# Patient Record
Sex: FEMALE | Race: BLACK OR AFRICAN AMERICAN | NOT HISPANIC OR LATINO | Employment: FULL TIME | URBAN - METROPOLITAN AREA
[De-identification: names, ages, dates, MRNs, and addresses within clinical notes are randomized per-mention and may not be internally consistent; named-entity substitution may affect disease eponyms.]

---

## 2017-05-08 ENCOUNTER — PATIENT MESSAGE (OUTPATIENT)
Dept: OBSTETRICS AND GYNECOLOGY | Facility: CLINIC | Age: 32
End: 2017-05-08

## 2017-05-26 ENCOUNTER — PATIENT MESSAGE (OUTPATIENT)
Dept: OBSTETRICS AND GYNECOLOGY | Facility: CLINIC | Age: 32
End: 2017-05-26

## 2017-06-07 ENCOUNTER — PATIENT MESSAGE (OUTPATIENT)
Dept: OBSTETRICS AND GYNECOLOGY | Facility: CLINIC | Age: 32
End: 2017-06-07

## 2017-06-28 ENCOUNTER — PATIENT MESSAGE (OUTPATIENT)
Dept: OBSTETRICS AND GYNECOLOGY | Facility: CLINIC | Age: 32
End: 2017-06-28

## 2017-06-30 ENCOUNTER — TELEPHONE (OUTPATIENT)
Dept: OBSTETRICS AND GYNECOLOGY | Facility: CLINIC | Age: 32
End: 2017-06-30

## 2017-06-30 NOTE — TELEPHONE ENCOUNTER
Called pt to reschedule her appt due to Dr Maynard not being available today.  Informed pt of appt date and time.  Pt verbalized understanding.  Letter sent out.

## 2017-07-03 ENCOUNTER — OFFICE VISIT (OUTPATIENT)
Dept: OBSTETRICS AND GYNECOLOGY | Facility: CLINIC | Age: 32
End: 2017-07-03
Attending: OBSTETRICS & GYNECOLOGY
Payer: MEDICAID

## 2017-07-03 VITALS
WEIGHT: 209.44 LBS | HEIGHT: 61 IN | DIASTOLIC BLOOD PRESSURE: 84 MMHG | BODY MASS INDEX: 39.54 KG/M2 | SYSTOLIC BLOOD PRESSURE: 130 MMHG

## 2017-07-03 DIAGNOSIS — Z30.9 ENCOUNTER FOR CONTRACEPTIVE MANAGEMENT, UNSPECIFIED TYPE: ICD-10-CM

## 2017-07-03 DIAGNOSIS — Z30.432 ENCOUNTER FOR IUD REMOVAL: Primary | ICD-10-CM

## 2017-07-03 LAB
B-HCG UR QL: NEGATIVE
CTP QC/QA: YES

## 2017-07-03 PROCEDURE — 99212 OFFICE O/P EST SF 10 MIN: CPT | Mod: PBBFAC,25 | Performed by: OBSTETRICS & GYNECOLOGY

## 2017-07-03 PROCEDURE — 99999 PR PBB SHADOW E&M-EST. PATIENT-LVL II: CPT | Mod: PBBFAC,,, | Performed by: OBSTETRICS & GYNECOLOGY

## 2017-07-03 PROCEDURE — 99499 UNLISTED E&M SERVICE: CPT | Mod: S$PBB,,, | Performed by: OBSTETRICS & GYNECOLOGY

## 2017-07-03 PROCEDURE — 58301 REMOVE INTRAUTERINE DEVICE: CPT | Mod: S$PBB,,, | Performed by: OBSTETRICS & GYNECOLOGY

## 2017-07-03 PROCEDURE — 81025 URINE PREGNANCY TEST: CPT | Mod: PBBFAC | Performed by: OBSTETRICS & GYNECOLOGY

## 2017-07-03 PROCEDURE — 58301 REMOVE INTRAUTERINE DEVICE: CPT | Mod: PBBFAC | Performed by: OBSTETRICS & GYNECOLOGY

## 2017-07-03 RX ORDER — MEDROXYPROGESTERONE ACETATE 150 MG/ML
150 INJECTION, SUSPENSION INTRAMUSCULAR
Qty: 1 ML | Refills: 4 | Status: SHIPPED | OUTPATIENT
Start: 2017-07-03 | End: 2017-07-27 | Stop reason: ALTCHOICE

## 2017-07-03 NOTE — PROGRESS NOTES
CC: Removal of IUD    Dipti Villafana is a 32 y.o. female  presents for IUD removal because she desires another form of contraception.      PRE-IUD REMOVAL COUNSELING:  The patient was advised of minimal risks of bleeding and pain and she agrees to proceed.    PROCEDURE:  TIME OUT PERFORMED.  IUD strings were  visualized at the os and grasped. IUD removed with gentle traction.  The patient tolerated the procedure well.    ASSESSMENT:  Contraceptive Management / Removal IUD.    POST IUD REMOVAL COUNSELING:  Expect period-like flow to occur after Mirena IUD removal and periods to return to pre-IUD pattern.  Manage post IUD removal cramping with NSAIDs, Tylenol or Rx per [employer].    POST IUD REMOVAL CONTRACEPTION:  If planning pregnancy, RX for prenatal vitamins.    Counseling lasted approximately 15 minutes and all her questions were answered.    FOLLOW-UP: With me in 2 weeks for Nexplanon placement.    Authorization submitted.  Patient desires DepoProvera until Nexplanon authorization received.

## 2017-07-20 ENCOUNTER — PATIENT MESSAGE (OUTPATIENT)
Dept: OBSTETRICS AND GYNECOLOGY | Facility: CLINIC | Age: 32
End: 2017-07-20

## 2017-07-26 ENCOUNTER — TELEPHONE (OUTPATIENT)
Dept: OBSTETRICS AND GYNECOLOGY | Facility: CLINIC | Age: 32
End: 2017-07-26

## 2017-07-26 DIAGNOSIS — Z30.011 ENCOUNTER FOR INITIAL PRESCRIPTION OF CONTRACEPTIVE PILLS: Primary | ICD-10-CM

## 2017-07-26 NOTE — TELEPHONE ENCOUNTER
----- Message from Mitchell Rush sent at 7/26/2017 11:00 AM CDT -----  PT IS CKING TO SEE IF HER INSURANCE APPROVE HER [phone]

## 2017-07-26 NOTE — TELEPHONE ENCOUNTER
Called Nexplanon and spoke to Mariya regarding the status of the the pt's benefit investigation.  Mariya stated that the Nexplanon is covered under buy and bill and the specialty pharmacy.  Instructed Mariya to fax over the benefit investigation.  Mariya verbalized understanding.  Called pt to inform her that her benefit investigation is being faxed over and that once the benefit investigation is received it will have to be sent to her pharmacy in order to get the device.  Questioned pt if she wanted BC pills in the mean time.  Pt stated that she would like OCPs until her Nexplanon comes in.  Informed pt that a message will be sent to Dr Maynard and she will receive a call back.  Pt verbalized understanding.    Pt is requesting OCPs until her Nexplanon comes in.

## 2017-07-27 RX ORDER — NORGESTIMATE AND ETHINYL ESTRADIOL 0.25-0.035
1 KIT ORAL DAILY
Qty: 30 TABLET | Refills: 11 | Status: SHIPPED | OUTPATIENT
Start: 2017-07-27 | End: 2019-06-11

## 2017-07-28 ENCOUNTER — TELEPHONE (OUTPATIENT)
Dept: OBSTETRICS AND GYNECOLOGY | Facility: CLINIC | Age: 32
End: 2017-07-28

## 2017-07-28 NOTE — TELEPHONE ENCOUNTER
Called pt to inform her that her RX for her OCPs has been sent to her pharmacy and once her Nexplanon is received she will get a call to schedule her insertion.  Pt verbalized understanding.

## 2017-08-11 ENCOUNTER — TELEPHONE (OUTPATIENT)
Dept: OBSTETRICS AND GYNECOLOGY | Facility: CLINIC | Age: 32
End: 2017-08-11

## 2017-08-11 NOTE — TELEPHONE ENCOUNTER
Called Nexplanon and spoke to April regarding checking the status of the pt's device.  April stated that [facility] tried to contact the office on 8/7/17.  Informed April that the office never receive a message from [employer].  April verbalized understanding and stated that the call will be transferred to [facility].  Spoke to Daniel at [facility] regarding the status of the pt's device. Daniel stated that the pt's device has not shipped out and that he will have it scheduled to deliver on 8/15/17.

## 2017-09-13 ENCOUNTER — TELEPHONE (OUTPATIENT)
Dept: OBSTETRICS AND GYNECOLOGY | Facility: CLINIC | Age: 32
End: 2017-09-13

## 2017-09-13 NOTE — TELEPHONE ENCOUNTER
Contacted [facility] regarding pt's delivery of her Nexplanon device. Spoke with Judah and she asked me to verify the address that the Nexplanon was being shipped to and if it was okay to be delivered on 9/15. Informed Judah that it was okay to be shipped for Friday. Judah verbalized understanding.

## 2017-09-13 NOTE — TELEPHONE ENCOUNTER
----- Message from Kelle Loya sent at 9/13/2017  4:03 PM CDT -----  Contact: [facility]  _  1st Request  _  2nd Request  _  3rd Request        Who: [facility]    Why: Pharmacy is calling to schedule a delivery date for the patient's Nexplanon IUD    What Number to Call Back: [phone]    When to Expect a call back: (Before the end of the day)   -- if call after 3:00 call back will be tomorrow.

## 2017-09-18 ENCOUNTER — TELEPHONE (OUTPATIENT)
Dept: OBSTETRICS AND GYNECOLOGY | Facility: CLINIC | Age: 32
End: 2017-09-18

## 2017-09-18 NOTE — TELEPHONE ENCOUNTER
Contacted [facility] pharmacy to see if they can ship the pt's device for insertion. Spoke with representative Lyndon and he asked me to verify pt's first and last name and date of birth. He asked me to verify her address and phone number. He asked me to verify the doctor's name. Verified the information with Lyndon. Lyndon transferred me to the specialty pharmacy direct line. Which is [phone]. Connected with Yadira in the [facility] specialty pharmacy she needed to go over the coverage claim with the claims department and then she'll be able to r/s the delivery. Yadira verified the office address and set the delivery for September 20th. Verbalized understanding with Yadira. Contacted pt to inform her that I her device is being shipped and that I will contact her to schedule an insertion once it arrives. Pt informed me that she contacted someone at the pharmacy and they to.ld her about three weeks ago that it had been shipped. Pt informed that when I spoke with Yadira she said there was a hold and that they had to run it through claims to make sure it was covered. Pt verbalized understanding.

## 2017-09-22 ENCOUNTER — TELEPHONE (OUTPATIENT)
Dept: OBSTETRICS AND GYNECOLOGY | Facility: CLINIC | Age: 32
End: 2017-09-22

## 2017-09-22 NOTE — TELEPHONE ENCOUNTER
Contacted the pt to schedule nexplanon insertion. Pt offered next available appointment. Pt inquired if she can be seen sooner. Pt offered another appointment. Pt given appointment location, date, and time. Pt verbalized understanding.

## 2017-10-12 ENCOUNTER — PROCEDURE VISIT (OUTPATIENT)
Dept: OBSTETRICS AND GYNECOLOGY | Facility: CLINIC | Age: 32
End: 2017-10-12
Attending: OBSTETRICS & GYNECOLOGY
Payer: MEDICAID

## 2017-10-12 VITALS
SYSTOLIC BLOOD PRESSURE: 94 MMHG | DIASTOLIC BLOOD PRESSURE: 56 MMHG | WEIGHT: 207.69 LBS | HEIGHT: 61 IN | BODY MASS INDEX: 39.21 KG/M2

## 2017-10-12 DIAGNOSIS — Z30.017 INSERTION OF NEXPLANON: Primary | ICD-10-CM

## 2017-10-12 LAB
B-HCG UR QL: NEGATIVE
CTP QC/QA: YES

## 2017-10-12 PROCEDURE — 11981 INSERTION DRUG DLVR IMPLANT: CPT | Mod: S$PBB,,, | Performed by: OBSTETRICS & GYNECOLOGY

## 2017-10-12 PROCEDURE — 11981 INSERTION DRUG DLVR IMPLANT: CPT | Mod: PBBFAC | Performed by: OBSTETRICS & GYNECOLOGY

## 2017-10-12 PROCEDURE — 81025 URINE PREGNANCY TEST: CPT | Mod: PBBFAC | Performed by: OBSTETRICS & GYNECOLOGY

## 2017-10-12 RX ORDER — CYCLOBENZAPRINE HCL 10 MG
TABLET ORAL
Refills: 0 | COMMUNITY
Start: 2017-07-29 | End: 2018-05-19 | Stop reason: ALTCHOICE

## 2017-10-12 RX ORDER — IBUPROFEN 800 MG/1
TABLET ORAL
Refills: 0 | COMMUNITY
Start: 2017-07-29 | End: 2018-05-19 | Stop reason: ALTCHOICE

## 2017-10-12 RX ADMIN — ETONOGESTREL 68 MG: 68 IMPLANT SUBCUTANEOUS at 11:10

## 2017-10-12 NOTE — PROCEDURES
Procedures     CC: NEXPLANON INSERTION:      PRE-NEXPLANON INSERTION COUNSELING:  All contraceptive options were reviewed and the patient chooses Implanon.  Patients history was reviewed and there were no contraindications to Implanon.  The procedure and minimal risks of pain, bleeding, bruising and infection at the insertion site discussed. Possible irregular menstrual bleeding pattern versus amenorrhea was explained.  No protection against STDs discussed.  Written information provided; all questions answered and patient agrees to proceed.  Consent signed and scanned into computer.    EXAM:  With patient in supine position the nondominant arm was flexed at the elbow and externally rotated.  The insertion site was identified 6-8 cm above the elbow crease at the inner side of the upper arm overlying the groove between biceps and triceps.  The insertion site was marked and a second sushila was placed 6-8 cm above the first.    PROCEDURE:  TIME OUT PERFORMED.  The insertion site was prepped with antiseptic and injected with 3 cc of 1% Xylocaine without epinephrine subq along the planned insertion canal.  Xylocaine subq along the planned insertion canal.  Using sterile technique the Nexplanon applicator was visually verified and removed from the blister pack.  The base of the applicator was gently tapped with the needle pointed up until the implant disappeared back into the needle and the needle cap was removed.  The needle tip was inserted bevel side up at a 20 degree angle to penetrate the skin.  The applicator was lowered parallel to the arm and the skin was tented with the needle.  The seal of the applicator was broken by pressing the obturator support and turned 90degrees.  The obturator tip was fixed in place on the arm with one hand and with the other hand the needle was slowly and fully retracted back along full length of the obturator.  The grooved tip of the obturator was visible inside the needle and the  implant was palpable after insertion.  A small adhesive bandage and then a pressure bandage was placed over the insertion site.  The patient tolerated the procedure well.    ASSESSMENT:  1. Contraception management / Nexplanon insertion.    POST NEXPLANON INSERTION COUNSELING:  Manage post Nexplanon placement arm pain with NSAIDs, Tylenol or Rx per Med[employer].  Keep arm elevated and apply intermittent ice packs to decrease pain and bruising for 24 Hours.  May remove bandage in 24 hours.  Nexplanon danger signs (worsening pain at insertion site, bleeding through bandage, redness and/or pus drainage at insertion site).  Removal in 3 years.    Counseling lasted approximately 15 minutes and all her questions were answered.    FOLLOW-UP: with me in two weeks.

## 2017-11-30 ENCOUNTER — TELEPHONE (OUTPATIENT)
Dept: OBSTETRICS AND GYNECOLOGY | Facility: CLINIC | Age: 32
End: 2017-11-30

## 2017-11-30 NOTE — TELEPHONE ENCOUNTER
Contacted the pt to r/s her appointment today on 11/30. Informed the pt that Dr. Maynard is out sick and that I can r/s her with another provider. Pt inquired if she can r/s to Dr. Batista's next available appointment. Informed the pt that Dr. Maynard's next available is not until January and that the schedule for January has not been released yet. Pt informed me that she will schedule for January once it has become available. Verbalized understanding. Reminder set.

## 2017-12-01 ENCOUNTER — TELEPHONE (OUTPATIENT)
Dept: OBSTETRICS AND GYNECOLOGY | Facility: CLINIC | Age: 32
End: 2017-12-01

## 2017-12-01 NOTE — TELEPHONE ENCOUNTER
Contacted the pt to schedule annual visit. No answer, left VM message for the pt to call the clinic back.

## 2017-12-01 NOTE — TELEPHONE ENCOUNTER
----- Message from Caio Ibrahim MA sent at 11/30/2017  9:20 AM CST -----  Schedule annual in early January

## 2017-12-12 ENCOUNTER — OFFICE VISIT (OUTPATIENT)
Dept: OBSTETRICS AND GYNECOLOGY | Facility: CLINIC | Age: 32
End: 2017-12-12
Payer: MEDICAID

## 2017-12-12 VITALS
WEIGHT: 206.81 LBS | DIASTOLIC BLOOD PRESSURE: 82 MMHG | HEIGHT: 61 IN | SYSTOLIC BLOOD PRESSURE: 124 MMHG | BODY MASS INDEX: 39.05 KG/M2

## 2017-12-12 DIAGNOSIS — Z01.419 WELL WOMAN EXAM WITH ROUTINE GYNECOLOGICAL EXAM: Primary | ICD-10-CM

## 2017-12-12 DIAGNOSIS — Z11.3 SCREEN FOR STD (SEXUALLY TRANSMITTED DISEASE): ICD-10-CM

## 2017-12-12 DIAGNOSIS — Z97.5 NEXPLANON IN PLACE: ICD-10-CM

## 2017-12-12 PROCEDURE — 99213 OFFICE O/P EST LOW 20 MIN: CPT | Mod: PBBFAC | Performed by: NURSE PRACTITIONER

## 2017-12-12 PROCEDURE — 87660 TRICHOMONAS VAGIN DIR PROBE: CPT

## 2017-12-12 PROCEDURE — 87591 N.GONORRHOEAE DNA AMP PROB: CPT

## 2017-12-12 PROCEDURE — 99395 PREV VISIT EST AGE 18-39: CPT | Mod: S$PBB,,, | Performed by: NURSE PRACTITIONER

## 2017-12-12 PROCEDURE — 87480 CANDIDA DNA DIR PROBE: CPT

## 2017-12-12 PROCEDURE — 99999 PR PBB SHADOW E&M-EST. PATIENT-LVL III: CPT | Mod: PBBFAC,,, | Performed by: NURSE PRACTITIONER

## 2017-12-12 NOTE — PROGRESS NOTES
CC: Annual  HPI: Pt is a 32 y.o.  female who presents for routine annual exam. She uses Nexplanon for contraception. She is very happy with it. She has not bled at all since getting it in October. She is able to palpate the implant in her left upper arm. She does not want STD screening. No problems or complaints today.    Last pap-8/2016 WNL    ROS:  GENERAL: Feeling well overall. Denies fever or chills.   SKIN: Denies rash or lesions.   HEAD: Denies head injury or headache.   NODES: Denies enlarged lymph nodes.   CHEST: Denies chest pain or shortness of breath.   CARDIOVASCULAR: Denies palpitations or left sided chest pain.   ABDOMEN: No abdominal pain, constipation, diarrhea, nausea, vomiting or rectal bleeding.   URINARY: No dysuria, hematuria, or burning on urination.  REPRODUCTIVE: See HPI.   BREASTS: Denies pain, lumps, or nipple discharge.   HEMATOLOGIC: No easy bruisability or excessive bleeding.   MUSCULOSKELETAL: Denies joint pain or swelling.   NEUROLOGIC: Denies syncope or weakness.   PSYCHIATRIC: Denies depression, anxiety or mood swings.    PE:   APPEARANCE: Well nourished, well developed, Black or  female in no acute distress.  NODES: no cervical, supraclavicular, or inguinal lymphadenopathy  BREASTS: Symmetrical, no skin changes or visible lesions. No palpable masses, nipple discharge or adenopathy bilaterally.  ABDOMEN: Soft. No tenderness or masses. No distention. No hernias palpated. No CVA tenderness.  VULVA: No lesions. Normal external female genitalia.  URETHRAL MEATUS: Normal size and location, no lesions, no prolapse.  URETHRA: No masses, tenderness, or prolapse.  VAGINA: Moist. No lesions or lacerations noted. No abnormal discharge present. No odor present.   CERVIX: No lesions or discharge. No cervical motion tenderness.   UTERUS: Normal size, regular shape, mobile, non-tender.  ADNEXA: No tenderness. No fullness or masses palpated in the adnexal regions.   ANUS PERINEUM:  Normal.      Diagnosis:  1. Well woman exam with routine gynecological exam    2. Nexplanon in place    3. Screen for STD (sexually transmitted disease)        Plan:     Orders Placed This Encounter    C. trachomatis/N. gonorrhoeae by AMP DNA Cervix    Vaginosis Screen by DNA Probe     -gcct and affirm pending  -pap current    Patient was counseled today on the new ACS guidelines for cervical cytology screening as well as the current recommendations for breast cancer screening. She was counseled to follow up with her PCP for other routine health maintenance. Counseling session lasted approximately 10 minutes, and all her questions were answered.    Follow-up with me in 1 year for routine exam; pap in 2 years.

## 2017-12-13 LAB
C TRACH DNA SPEC QL NAA+PROBE: NOT DETECTED
CANDIDA RRNA VAG QL PROBE: NEGATIVE
G VAGINALIS RRNA GENITAL QL PROBE: NEGATIVE
N GONORRHOEA DNA SPEC QL NAA+PROBE: NOT DETECTED
T VAGINALIS RRNA GENITAL QL PROBE: NEGATIVE

## 2018-05-19 ENCOUNTER — HOSPITAL ENCOUNTER (EMERGENCY)
Facility: OTHER | Age: 33
Discharge: HOME | End: 2018-05-19
Attending: EMERGENCY MEDICINE
Payer: MEDICAID

## 2018-05-19 VITALS
BODY MASS INDEX: 38.68 KG/M2 | SYSTOLIC BLOOD PRESSURE: 139 MMHG | HEART RATE: 79 BPM | OXYGEN SATURATION: 96 % | WEIGHT: 197 LBS | RESPIRATION RATE: 17 BRPM | DIASTOLIC BLOOD PRESSURE: 73 MMHG | TEMPERATURE: 97 F | HEIGHT: 60 IN

## 2018-05-19 DIAGNOSIS — R10.9 ACUTE LEFT FLANK PAIN: ICD-10-CM

## 2018-05-19 DIAGNOSIS — S39.012A BACK STRAIN, INITIAL ENCOUNTER: Primary | ICD-10-CM

## 2018-05-19 LAB
B-HCG UR QL: NEGATIVE
BILIRUB UR QL STRIP: NEGATIVE
CLARITY UR: CLEAR
COLOR UR: YELLOW
CTP QC/QA: YES
GLUCOSE UR QL STRIP: NEGATIVE
HGB UR QL STRIP: NEGATIVE
KETONES UR QL STRIP: NEGATIVE
LEUKOCYTE ESTERASE UR QL STRIP: NEGATIVE
NITRITE UR QL STRIP: NEGATIVE
PH UR STRIP: 6 [PH] (ref 5–8)
PROT UR QL STRIP: NEGATIVE
SP GR UR STRIP: >=1.03 (ref 1–1.03)
URN SPEC COLLECT METH UR: ABNORMAL
UROBILINOGEN UR STRIP-ACNC: NEGATIVE EU/DL

## 2018-05-19 PROCEDURE — 81003 URINALYSIS AUTO W/O SCOPE: CPT

## 2018-05-19 PROCEDURE — 99284 EMERGENCY DEPT VISIT MOD MDM: CPT | Mod: 25

## 2018-05-19 PROCEDURE — 63600175 PHARM REV CODE 636 W HCPCS: Performed by: EMERGENCY MEDICINE

## 2018-05-19 PROCEDURE — 81025 URINE PREGNANCY TEST: CPT | Performed by: EMERGENCY MEDICINE

## 2018-05-19 PROCEDURE — 96372 THER/PROPH/DIAG INJ SC/IM: CPT

## 2018-05-19 RX ORDER — METHOCARBAMOL 500 MG/1
1000 TABLET, FILM COATED ORAL 2 TIMES DAILY PRN
Qty: 25 TABLET | Refills: 0 | Status: SHIPPED | OUTPATIENT
Start: 2018-05-19 | End: 2018-05-24

## 2018-05-19 RX ORDER — KETOROLAC TROMETHAMINE 30 MG/ML
15 INJECTION, SOLUTION INTRAMUSCULAR; INTRAVENOUS
Status: COMPLETED | OUTPATIENT
Start: 2018-05-19 | End: 2018-05-19

## 2018-05-19 RX ORDER — IBUPROFEN 600 MG/1
600 TABLET ORAL EVERY 8 HOURS PRN
Qty: 30 TABLET | Refills: 0 | Status: SHIPPED | OUTPATIENT
Start: 2018-05-19 | End: 2018-07-31

## 2018-05-19 RX ADMIN — KETOROLAC TROMETHAMINE 15 MG: 30 INJECTION, SOLUTION INTRAMUSCULAR; INTRAVENOUS at 04:05

## 2018-05-19 NOTE — ED NOTES
Patient presents to the ED with a complaint of left sided back pain that has started recently, patient states that pain worsens with movement. Patient denies any trouble with urination.

## 2018-05-19 NOTE — ED PROVIDER NOTES
Encounter Date: 5/19/2018    SCRIBE #1 NOTE: I, Justina Galindo, am scribing for, and in the presence of, Dr. Tang.       History     Chief Complaint   Patient presents with    Back Pain     pt states left side of her lower back hurts 6/10, denies it moving to abdomen or having urinary symptoms. No previous injury     Time seen by provider: 4:03 PM    This is a 32 y.o. female who presents with complaint of left mid back pain that began while at rest last night. The pain worsens with movement and deep breaths. She denies recent trauma, injury, or heavy lifting. She denies chest pain, fever, abdominal pain, urinary symptoms, numbness, or weakness. No h/o similar episodes      The history is provided by the patient.     Review of patient's allergies indicates:  No Known Allergies  No past medical history on file.  No past surgical history on file.  Family History   Problem Relation Age of Onset    Breast cancer Neg Hx     Colon cancer Neg Hx     Ovarian cancer Neg Hx      Social History   Substance Use Topics    Smoking status: Never Smoker    Smokeless tobacco: Never Used    Alcohol use Yes      Comment: occasional     Review of Systems   Constitutional: Negative for fever.   HENT: Negative for sore throat.    Respiratory: Positive for shortness of breath.    Cardiovascular: Negative for chest pain and palpitations.   Gastrointestinal: Negative for abdominal pain, nausea and vomiting.   Genitourinary: Negative for decreased urine volume, difficulty urinating, dysuria, enuresis, frequency, hematuria and urgency.   Musculoskeletal: Positive for back pain. Negative for gait problem.        Positive for leg pain.   Skin: Negative for rash.   Neurological: Negative for weakness and numbness.   Hematological: Does not bruise/bleed easily.       Physical Exam     Initial Vitals [05/19/18 1418]   BP Pulse Resp Temp SpO2   136/84 108 16 97.7 °F (36.5 °C) 99 %      MAP       101.33         Physical Exam    Nursing  note and vitals reviewed.  Constitutional: She appears well-developed and well-nourished. She is not diaphoretic. No distress.   HENT:   Head: Normocephalic and atraumatic.   Eyes: Conjunctivae and EOM are normal. No scleral icterus.   Neck: Normal range of motion. Neck supple.   Cardiovascular: Normal rate, regular rhythm and normal heart sounds. Exam reveals no gallop and no friction rub.    No murmur heard.  Pulmonary/Chest: Breath sounds normal. No respiratory distress. She has no wheezes. She has no rhonchi. She has no rales.   Musculoskeletal: Normal range of motion. She exhibits tenderness (left lower thoracic paraspinal muscles). She exhibits no edema.   Lymphadenopathy:     She has no cervical adenopathy.   Neurological: She is alert and oriented to person, place, and time. She has normal strength. No cranial nerve deficit.   Skin: Skin is warm and dry. No rash noted. No pallor.   Psychiatric: She has a normal mood and affect. Her behavior is normal. Thought content normal.         ED Course   Procedures  Labs Reviewed   URINALYSIS - Abnormal; Notable for the following:        Result Value    Specific Gravity, UA >=1.030 (*)     All other components within normal limits   POCT URINE PREGNANCY      Imaging Results          X-Ray Chest PA And Lateral (Final result)  Result time 05/19/18 17:05:31    Final result by Jorge Ogden MD (05/19/18 17:05:31)                 Impression:      1. No acute cardiopulmonary process.      Electronically signed by: Jorge Ogden MD  Date:    05/19/2018  Time:    17:05             Narrative:    EXAMINATION:  XR CHEST PA AND LATERAL    CLINICAL HISTORY:  Unspecified abdominal pain    TECHNIQUE:  PA and lateral views of the chest were performed.    COMPARISON:  None    FINDINGS:  The cardiomediastinal silhouette is not enlarged.  There is no pleural effusion.  The trachea is midline.  The lungs are symmetrically expanded bilaterally without evidence of acute parenchymal  process. No large focal consolidation seen.  There is no pneumothorax.  The osseous structures are unremarkable.                                   X-Rays:   Independently Interpreted Readings:   Chest X-Ray: No infiltrates, effusion, or pneumothorax.     Medical Decision Making:   Initial Assessment:       Healthy 31 y/o female with 1 day atrauamtic left mid back pain. Mild paraspinal muscle tenderness on exam with no associated urinary symptoms or abdominal tenderness. Worse with movement, so mostly likely muscle strain or spasm. No DVT/PE risk factors, there is some pleuritic component to pain but hypoxia or SOB at present. UA and CXR unremarkable, no sign renal colic, pyelo, or PNA. Improved with Toradol. Will discharge with NSAIDs and Robaxin PRN with PCP follow up and return precautions.      Clinical Tests:   Lab Tests: Reviewed and Ordered  Radiological Study: Ordered and Reviewed            Scribe Attestation:   Scribe #1: I performed the above scribed service and the documentation accurately describes the services I performed. I attest to the accuracy of the note.    Attending Attestation:           Physician Attestation for Scribe:  Physician Attestation Statement for Scribe #1: I, Dr. Tang, reviewed documentation, as scribed by Justina Galindo in my presence, and it is both accurate and complete.                    Clinical Impression:     1. Back strain, initial encounter    2. Acute left flank pain                               Hitesh Tang MD  05/20/18 [id number]

## 2018-07-31 ENCOUNTER — HOSPITAL ENCOUNTER (EMERGENCY)
Facility: OTHER | Age: 33
Discharge: HOME | End: 2018-07-31
Attending: EMERGENCY MEDICINE
Payer: MEDICAID

## 2018-07-31 ENCOUNTER — OFFICE VISIT (OUTPATIENT)
Dept: OBSTETRICS AND GYNECOLOGY | Facility: CLINIC | Age: 33
End: 2018-07-31
Payer: MEDICAID

## 2018-07-31 VITALS
WEIGHT: 206 LBS | BODY MASS INDEX: 38.89 KG/M2 | DIASTOLIC BLOOD PRESSURE: 75 MMHG | HEIGHT: 61 IN | TEMPERATURE: 99 F | RESPIRATION RATE: 17 BRPM | OXYGEN SATURATION: 100 % | SYSTOLIC BLOOD PRESSURE: 152 MMHG | HEART RATE: 64 BPM

## 2018-07-31 VITALS
DIASTOLIC BLOOD PRESSURE: 88 MMHG | SYSTOLIC BLOOD PRESSURE: 122 MMHG | BODY MASS INDEX: 39.05 KG/M2 | WEIGHT: 206.81 LBS | HEIGHT: 61 IN

## 2018-07-31 DIAGNOSIS — N93.9 ABNORMAL UTERINE BLEEDING (AUB): Primary | ICD-10-CM

## 2018-07-31 DIAGNOSIS — M79.89 FOOT SWELLING: Primary | ICD-10-CM

## 2018-07-31 DIAGNOSIS — M79.673 FOOT PAIN: ICD-10-CM

## 2018-07-31 LAB
B-HCG UR QL: NEGATIVE
CTP QC/QA: YES

## 2018-07-31 PROCEDURE — 99284 EMERGENCY DEPT VISIT MOD MDM: CPT | Mod: 25,27

## 2018-07-31 PROCEDURE — 81025 URINE PREGNANCY TEST: CPT | Performed by: EMERGENCY MEDICINE

## 2018-07-31 PROCEDURE — 99213 OFFICE O/P EST LOW 20 MIN: CPT | Mod: PBBFAC | Performed by: OBSTETRICS & GYNECOLOGY

## 2018-07-31 PROCEDURE — 99999 PR PBB SHADOW E&M-EST. PATIENT-LVL III: CPT | Mod: PBBFAC,,, | Performed by: OBSTETRICS & GYNECOLOGY

## 2018-07-31 PROCEDURE — 99213 OFFICE O/P EST LOW 20 MIN: CPT | Mod: S$PBB,,, | Performed by: OBSTETRICS & GYNECOLOGY

## 2018-07-31 RX ORDER — ESTRADIOL 2 MG/1
2 TABLET ORAL DAILY
Qty: 30 TABLET | Refills: 0 | Status: SHIPPED | OUTPATIENT
Start: 2018-07-31 | End: 2019-06-11

## 2018-07-31 RX ORDER — IBUPROFEN 600 MG/1
600 TABLET ORAL EVERY 6 HOURS PRN
Qty: 20 TABLET | Refills: 0 | Status: SHIPPED | OUTPATIENT
Start: 2018-07-31 | End: 2019-06-11

## 2018-07-31 NOTE — ED NOTES
Two patient identifiers have been checked and are correct.      Appearance: Pt awake, alert & oriented to person, place & time. Pt in no acute distress at present time. Pt is clean and well groomed with clothes appropriately fastened.   Skin: Skin warm, dry & intact. Color consistent with ethnicity. Mucous membranes moist. No breakdown or brusing noted.   Musculoskeletal: Patient moving all extremities well, no obvious swelling or deformities noted. + left anterior foot pain and tenderness, denies numbness/tingling to affected site. No bruising or swelling noted to area.   Respiratory: Respirations spontaneous, even, and non-labored. Visible chest rise noted. Airway is open and patent. No accessory muscle use noted.   Neurologic: Sensation is intact. Speech is clear and appropriate. Eyes open spontaneously, behavior appropriate to situation, follows commands, facial expression symmetrical, bilateral hand grasp equal and even, purposeful motor response noted.  Cardiac: All peripheral pulses present. No Bilateral lower extremity edema. Cap refill is <3 seconds.

## 2018-07-31 NOTE — ED PROVIDER NOTES
"Encounter Date: 7/31/2018       History     Chief Complaint   Patient presents with    Foot Pain     + left sided foot pain w/ new onset yesterday " when I got off work I noticed the top of my foot was all swollen and hurt". Denies trauma or injury to site. + 2 pulses no bruising noted     A 33-year-old female with no significant past medical history presents to the emergency department with complaints of swelling to the left foot.  She reports associated pain. She states it started last night after working.  She states it was larger last night has improved since after she elevated the affected foot and soaked it.  She denies any known trauma, injury, redness, warmth, fever, chills. she reports pain at a 8/10.      The history is provided by the patient.     Review of patient's allergies indicates:  No Known Allergies  History reviewed. No pertinent past medical history.  History reviewed. No pertinent surgical history.  Family History   Problem Relation Age of Onset    Breast cancer Neg Hx     Colon cancer Neg Hx     Ovarian cancer Neg Hx      Social History   Substance Use Topics    Smoking status: Never Smoker    Smokeless tobacco: Never Used    Alcohol use Yes      Comment: occasional     Review of Systems   Constitutional: Negative for chills and fever.   HENT: Negative for sore throat.    Respiratory: Negative for shortness of breath.    Cardiovascular: Negative for chest pain.   Gastrointestinal: Negative for nausea and vomiting.   Genitourinary: Negative for dysuria.   Musculoskeletal: Positive for arthralgias and joint swelling. Negative for back pain, neck pain and neck stiffness.   Skin: Negative for rash.   Neurological: Negative for weakness and numbness.   Hematological: Does not bruise/bleed easily.       Physical Exam     Initial Vitals [07/31/18 1020]   BP Pulse Resp Temp SpO2   (!) 143/74 70 16 99.4 °F (37.4 °C) 100 %      MAP       --         Physical Exam    Nursing note and vitals " reviewed.  Constitutional: She appears well-developed and well-nourished. She is not diaphoretic.  Non-toxic appearance. No distress.   HENT:   Head: Normocephalic and atraumatic.   Right Ear: External ear normal.   Left Ear: External ear normal.   Nose: Nose normal.   Eyes: Conjunctivae and lids are normal. No scleral icterus.   Neck: Normal range of motion and phonation normal. Neck supple.   Cardiovascular: Normal rate, regular rhythm, intact distal pulses and normal pulses. Exam reveals no decreased pulses.    Pulses:       Dorsalis pedis pulses are 2+ on the right side, and 2+ on the left side.   Abdominal: Normal appearance.   Musculoskeletal: Normal range of motion.        Left foot: There is swelling. There is normal range of motion, no tenderness, no bony tenderness, normal capillary refill, no crepitus, no deformity and no laceration.        Feet:    No obvious deformities, moving all extremities, normal gait  Localized swelling to the left dorsal foot that measures approximately 1 cm in diameter. Intact distal pulses with no sensory deficits.  No erythema, warmth, ecchymosis, induration or fluctuance.  No lymphangitis.  No open wounds.  Capillary refill less than 3 sec.  No bony deformity bony tenderness palpation on exam.   Neurological: She is alert and oriented to person, place, and time. She has normal strength. No sensory deficit.   Skin: Skin is warm, dry and intact. Capillary refill takes less than 2 seconds. No abrasion, no bruising, no ecchymosis, no laceration, no lesion, no rash and no abscess noted. No erythema.   Psychiatric: She has a normal mood and affect. Her speech is normal and behavior is normal. Judgment normal. Cognition and memory are normal.         ED Course   Procedures  Labs Reviewed   POCT URINE PREGNANCY          Imaging Results          X-Ray Foot Complete Left (Final result)  Result time 07/31/18 10:53:08    Final result by Grover Esquivel MD (07/31/18 10:53:08)                  Impression:      No evidence for acute fracture, bone destruction, or dislocation.    Mild degenerative changes.    Calcaneal spurs.      Electronically signed by: Grover Esquivel MD  Date:    07/31/2018  Time:    10:53             Narrative:    EXAMINATION:  XR FOOT COMPLETE 3 VIEW LEFT    CLINICAL HISTORY:  .  Pain in unspecified foot    TECHNIQUE:  AP, lateral and oblique views of the left foot were performed.    COMPARISON:  None    FINDINGS:  The bones are intact.  There is no evidence for acute fracture or bone destruction.  There are mild degenerative changes.  No bony erosions are identified.  There are small calcaneal spurs at the insertions of the Achilles tendon and plantar fascia.  Soft tissues are grossly unremarkable.                                 Medical Decision Making:   History:   Old Medical Records: I decided to obtain old medical records.  Initial Assessment:   33-year-old female with complaints consistent with foot pain and swelling.  Afebrile neurovascularly intact.  She is alert and healthy and nontoxic appearing.  She is in no apparent distress. No focal neurological deficits.  Exam documented above.  There is localized swelling to the dorsal aspect of the foot.  Discussed she where which patient states that she wears tennis shoes while at work.  This seems to be at a location of possible pressure point from the shoe.  She denies any changes in her footwear.  She denies trauma or injury. Does not appear to be consistent with serious bacterial infection, abscess or cellulitis.  No signs of trauma or injury and I doubt fracture dislocation.  Clinical Tests:   Lab Tests: Reviewed  Radiological Study: Reviewed  ED Management:  UPT negative.  X-ray obtained from triage with no evidence of acute fracture, dislocation or bony destruction.  She does have mild degenerative changes and calcaneus spur is present.  Will apply Ace wrap.  She is urged to assess her shoe wear and follow up if  symptoms do not improve.  Given information for the [facility].  She states understanding and agrees with this plan.  Will discharge home with ibuprofen.  This is the extent of patient's complaints today.  The patient was discussed with the attending physician who agrees with treatment plan.  Other:   I have discussed this case with another health care provider.       <> Summary of the Discussion: Rao  This note was created using [employer] dictation.  There may be typographical errors secondary to dictation.                        Clinical Impression:     1. Foot swelling    2. Foot pain            Disposition:   Disposition: Discharged  Condition: Stable                        Homa Vanegas PA-C  07/31/18 1124

## 2018-07-31 NOTE — ED TRIAGE NOTES
+ left sided foot pain w/ new onset yesterday. Denies injury or trauma to site. No numbness/tinlging to extremity

## 2018-07-31 NOTE — PROGRESS NOTES
Subjective:       Patient ID: Dipti Villafana is a 33 y.o. female.    Chief Complaint:  Vaginal Bleeding      History of Present Illness  Vaginal Bleeding   This is a new problem. The current episode started 1 to 4 weeks ago. The problem occurs rarely. The problem has been resolved. The pain is severe. The problem affects both sides. She is not pregnant. Associated symptoms include back pain and discolored urine. Pertinent negatives include no abdominal pain, anorexia, chills, constipation, diarrhea, dysuria, fever, flank pain, frequency, headaches, hematuria, nausea, painful intercourse, rash, urgency or vomiting. The vaginal bleeding is heavier than menses. She has been passing clots. She has not been passing tissue. The symptoms are aggravated by urinating. She has tried acetaminophen and warm bath for the symptoms. The treatment provided significant relief. She is sexually active. No, her partner does not have an STD. She uses an IUD for contraception. Her menstrual history has been irregular. Her past medical history is significant for menorrhagia. There is no history of an abdominal surgery, a  section, an ectopic pregnancy, endometriosis, a gynecological surgery, herpes simplex, metrorrhagia, miscarriage, ovarian cysts, perineal abscess, PID, an STD, a terminated pregnancy or vaginosis.     32 y/o  presents for evaluation of AUB with Nexplanon in place. Nexplanon placed in 10/12/17. Had no bleeding for about 8 months. Started bleeding heavily about 2 weeks ago for about 7 days (some days heavy, some days spotting). Bleeding has now stopped. She had the Mirena IUD for about 5 years without bleeding but switched to nexplanon due to discomfort at the end with activities such as exercise and intercourse. Otherwise has been happy with the Nexplanon.     GYN & OB History  No LMP recorded. Patient is not currently having periods (Reason: Birth Control).   Date of Last Pap: 2016    OB History     Para Term  AB Living   2 2 0 0 0 3   SAB TAB Ectopic Multiple Live Births   0 0 0 1 3      # Outcome Date GA Lbr Jaspreet/2nd Weight Sex Delivery Anes PTL Lv   2 Para 12    M Vag-Spont   JUAN CARLOS   1A Para 03/12/10    M Vag-Spont   JUAN CARLOS   1B Para 03/12/10    M Vag-Spont  N JUAN CARLOS          Review of Systems  Review of Systems   Constitutional: Negative for chills and fever.   Gastrointestinal: Negative for abdominal pain, anorexia, constipation, diarrhea, nausea and vomiting.   Genitourinary: Positive for menorrhagia, menstrual problem and vaginal bleeding. Negative for dysuria, flank pain, frequency, hematuria and urgency.   Musculoskeletal: Positive for back pain.   Skin:  Negative for rash.   Neurological: Negative for headaches.           Objective:    Physical Exam:   Constitutional: She is oriented to person, place, and time. She appears well-developed and well-nourished. No distress.    HENT:   Head: Normocephalic and atraumatic.    Eyes: EOM are normal.    Neck: Normal range of motion.     Pulmonary/Chest: Effort normal.                  Musculoskeletal: Normal range of motion and moves all extremeties.       Neurological: She is alert and oriented to person, place, and time.    Skin: Skin is warm. No rash noted.    Psychiatric: She has a normal mood and affect. Her behavior is normal. Judgment and thought content normal.          Assessment:        1. Abnormal uterine bleeding (AUB)             Plan:      Dipti was seen today for vaginal bleeding.    Diagnoses and all orders for this visit:    Abnormal uterine bleeding (AUB)  - Counseled on AUB with Nexplanon. Recommended to take estrace 2 mg PO daily x 7 days if bleeding recurs. If AUB continues, consider TVUS to assess anatomical causes or consider alternative contraception method. All questions answered.   -     estradiol (ESTRACE) 2 MG tablet; Take 1 tablet (2 mg total) by mouth once daily.    No orders of the defined types were placed in this  encounter.      Follow-up if symptoms worsen or fail to improve.

## 2019-06-10 ENCOUNTER — TELEPHONE (OUTPATIENT)
Dept: OBSTETRICS AND GYNECOLOGY | Facility: CLINIC | Age: 34
End: 2019-06-10

## 2019-06-10 NOTE — TELEPHONE ENCOUNTER
Returned call to the patient. Patient c/o passing big blood clots the size of a tangerine. Patient stated that she started bleeding last Wednesday 6/5. Patient stated that the bleeding stopped Saturday   started again 2 days ago.denies pelvic or abdominal pain a little back pain pt denies heavy bleeding. Gush of clots when she uses the restroom or takes a shower, LMP was on 5/18

## 2019-06-10 NOTE — TELEPHONE ENCOUNTER
----- Message from Jacinta Sena sent at 6/10/2019 11:19 AM CDT -----  Contact: pt  Name of Who is Calling: ONIEL BUCHANAN [[id number]]      What is the request in detail: pt calling in regards to irregular bleeding and blood cloths.. Please advise      Can the clinic reply by [facility]: yes      What [facility]: [phone]

## 2019-06-10 NOTE — TELEPHONE ENCOUNTER
Returned call to the patient. Patient c/o passing big blood clots the size of a tangerine. Patient stated that she started bleeding last Wednesday 6/5. Patient stated that the bleeding stopped Saturday 6/8 and started again 2 days later. Patient denies pelvic or abdominal pain. She stated that she has a little back pain. Patient denies heavy bleeding. Patient stated that she only feels a gush of clots when she uses the restroom or takes a shower. Patient stated that her LMP was on 5/18 and bleeding has been starting and stopping since. Patient agreed to an appointment with Dr. Tracey Gilbert on 6/11 at 3:45PM. Patient is scheduled for a f/u with Dr. Maynard on 9/4 at 9:45AM or if recommended earlier by Dr. Gilbert. Patient verbalized understanding.

## 2019-06-11 ENCOUNTER — OFFICE VISIT (OUTPATIENT)
Dept: OBSTETRICS AND GYNECOLOGY | Facility: CLINIC | Age: 34
End: 2019-06-11
Payer: MEDICAID

## 2019-06-11 VITALS
DIASTOLIC BLOOD PRESSURE: 80 MMHG | BODY MASS INDEX: 37.63 KG/M2 | WEIGHT: 199.31 LBS | HEIGHT: 61 IN | SYSTOLIC BLOOD PRESSURE: 124 MMHG

## 2019-06-11 DIAGNOSIS — N93.9 ABNORMAL UTERINE BLEEDING (AUB): Primary | ICD-10-CM

## 2019-06-11 PROCEDURE — 99999 PR PBB SHADOW E&M-EST. PATIENT-LVL III: CPT | Mod: PBBFAC,,, | Performed by: OBSTETRICS & GYNECOLOGY

## 2019-06-11 PROCEDURE — 99999 PR PBB SHADOW E&M-EST. PATIENT-LVL III: ICD-10-PCS | Mod: PBBFAC,,, | Performed by: OBSTETRICS & GYNECOLOGY

## 2019-06-11 PROCEDURE — 99214 OFFICE O/P EST MOD 30 MIN: CPT | Mod: S$PBB,,, | Performed by: OBSTETRICS & GYNECOLOGY

## 2019-06-11 PROCEDURE — 99214 PR OFFICE/OUTPT VISIT, EST, LEVL IV, 30-39 MIN: ICD-10-PCS | Mod: S$PBB,,, | Performed by: OBSTETRICS & GYNECOLOGY

## 2019-06-11 PROCEDURE — 99213 OFFICE O/P EST LOW 20 MIN: CPT | Mod: PBBFAC | Performed by: OBSTETRICS & GYNECOLOGY

## 2019-06-11 RX ORDER — NORELGESTROMIN AND ETHINYL ESTRADIOL 35; 150 UG/MG; UG/MG
1 PATCH TRANSDERMAL
Qty: 3 PATCH | Refills: 3 | Status: SHIPPED | OUTPATIENT
Start: 2019-06-11 | End: 2019-09-25 | Stop reason: SDUPTHER

## 2019-06-11 RX ORDER — CITALOPRAM 20 MG/1
TABLET, FILM COATED ORAL
Refills: 3 | COMMUNITY
Start: 2019-05-24 | End: 2022-10-05

## 2019-06-11 NOTE — PROGRESS NOTES
Subjective:       Patient ID: Dipti Villafana is a 34 y.o. female.    Chief Complaint:  Metrorrhagia    History of Present Illness  Vaginal Bleeding   This is a recurrent problem. The current episode started 1 to 4 weeks ago. The problem occurs daily. The problem has been waxing and waning. The pain is mild. The problem affects both sides. She is not pregnant. Associated symptoms include abdominal pain, anorexia, back pain, diarrhea, discolored urine, flank pain, headaches, hematuria, nausea and vomiting. Pertinent negatives include no chills, constipation, dysuria, fever, frequency, painful intercourse, rash or urgency. The vaginal bleeding is heavier than menses. She has been passing clots. She has been passing tissue. The symptoms are aggravated by activity. She has tried warm bath for the symptoms. The treatment provided no relief. She is sexually active. No, her partner does not have an STD. She uses condoms and an IUD for contraception. Her menstrual history has been irregular. Her past medical history is significant for menorrhagia. There is no history of an abdominal surgery, a  section, an ectopic pregnancy, endometriosis, a gynecological surgery, herpes simplex, metrorrhagia, miscarriage, ovarian cysts, perineal abscess, PID, an STD, a terminated pregnancy or vaginosis.     33 y/o  presents for evaluation of prolonged menstrual cycle. She started bleed on 19 and has been bleeding since that time. She reports passing blood clots the size of a tangerine She has Nexplanon in place since 10/2017 and had AUB last year. She was previously taking estrace during her period and cycles were lighter, lasting about 3 days. She ran out of estrace and believes that is why this cycle has been so long and heavy. She had the Mirena IUD for about 5 years without bleeding but switched to nexplanon due to discomfort at the end with activities such as exercise and intercourse.    GYN & OB History  No LMP  recorded. (Menstrual status: Birth Control).   Date of Last Pap: 2016    OB History    Para Term  AB Living   2 2 0 0 0 3   SAB TAB Ectopic Multiple Live Births   0 0 0 1 3      # Outcome Date GA Lbr Jaspreet/2nd Weight Sex Delivery Anes PTL Lv   2 Para 12    M Vag-Spont   JUAN CARLOS   1A Para 03/12/10    M Vag-Spont   JUAN CARLOS   1B Para 03/12/10    M Vag-Spont  N JUAN CARLOS       Review of Systems  Review of Systems   Constitutional: Negative for chills and fever.   Respiratory: Negative for cough and shortness of breath.    Gastrointestinal: Positive for abdominal pain, anorexia, diarrhea, nausea and vomiting. Negative for constipation.   Genitourinary: Positive for flank pain, hematuria, menorrhagia, menstrual problem and vaginal bleeding. Negative for dysmenorrhea, dyspareunia, dysuria, frequency and urgency.   Musculoskeletal: Positive for back pain. Negative for myalgias.   Integumentary:  Negative for rash and acne.   Neurological: Positive for headaches. Negative for seizures.   Hematological: Negative for adenopathy. Does not bruise/bleed easily.   Psychiatric/Behavioral: Negative for depression. The patient is not nervous/anxious.            Objective:    Physical Exam:   Constitutional: She is oriented to person, place, and time. She appears well-developed and well-nourished. No distress.    HENT:   Head: Normocephalic and atraumatic.    Eyes: EOM are normal.    Neck: Normal range of motion.     Pulmonary/Chest: Effort normal.        Abdominal: Soft. She exhibits no distension, no mass and no abdominal incision. There is no tenderness. There is no rebound and no guarding. No hernia.     Genitourinary:   Genitourinary Comments: Normal external female genitalia; vagina rugated, normal; cervix normal, no masses; uterus small mobile nontender; no adnexal masses palpated; rectal deferred           Musculoskeletal: Normal range of motion and moves all extremeties.       Neurological: She is alert and oriented  to person, place, and time.    Skin: Skin is warm. No rash noted.    Psychiatric: She has a normal mood and affect. Her behavior is normal. Judgment and thought content normal.          Assessment:        1. Abnormal uterine bleeding (AUB)             Plan:       Dipti was seen today for metrorrhagia.    Diagnoses and all orders for this visit:    Abnormal uterine bleeding (AUB)  - Patient counseled on options for controlling AUB with Nexplanon in place including estrogen, progestin, and combined estrogen/progestin. She desires to try ortho-evra patch.   - Counseled that given that she had no AUB with Mirena IUD, less likely for structural cause, but if persistent, consider further workup.   - Labs ordered.   - Precautions reviewed.   - UPT neg.  -     CBC auto differential; Future  -     TSH; Future  -     norelgestromin-ethinyl estradiol (ORTHO EVRA) 150-35 mcg/24 hr; Place 1 patch onto the skin every 7 days.      Orders Placed This Encounter   Procedures    CBC auto differential    TSH       Follow up if symptoms worsen or fail to improve.

## 2019-06-13 ENCOUNTER — PATIENT MESSAGE (OUTPATIENT)
Dept: OBSTETRICS AND GYNECOLOGY | Facility: CLINIC | Age: 34
End: 2019-06-13

## 2019-06-27 ENCOUNTER — PATIENT MESSAGE (OUTPATIENT)
Dept: OBSTETRICS AND GYNECOLOGY | Facility: CLINIC | Age: 34
End: 2019-06-27

## 2019-06-28 ENCOUNTER — TELEPHONE (OUTPATIENT)
Dept: OBSTETRICS AND GYNECOLOGY | Facility: CLINIC | Age: 34
End: 2019-06-28

## 2019-09-10 ENCOUNTER — TELEPHONE (OUTPATIENT)
Dept: OBSTETRICS AND GYNECOLOGY | Facility: CLINIC | Age: 34
End: 2019-09-10

## 2019-09-10 NOTE — TELEPHONE ENCOUNTER
Contacted the patient to r/s appointment on 9/19 due to Dr. Maynard being out of the office. Patient agreed to r/s to 9/18 at 3:45PM. Patient verbalized understanding.

## 2019-09-25 DIAGNOSIS — N93.9 ABNORMAL UTERINE BLEEDING (AUB): ICD-10-CM

## 2019-09-25 RX ORDER — NORELGESTROMIN AND ETHINYL ESTRADIOL 150; 35 UG/D; UG/D
PATCH TRANSDERMAL
Qty: 3 PATCH | Refills: 3 | Status: SHIPPED | OUTPATIENT
Start: 2019-09-25 | End: 2019-11-07

## 2019-11-07 ENCOUNTER — OFFICE VISIT (OUTPATIENT)
Dept: OBSTETRICS AND GYNECOLOGY | Facility: CLINIC | Age: 34
End: 2019-11-07
Payer: MEDICAID

## 2019-11-07 ENCOUNTER — LAB VISIT (OUTPATIENT)
Dept: LAB | Facility: OTHER | Age: 34
End: 2019-11-07
Payer: MEDICAID

## 2019-11-07 VITALS
BODY MASS INDEX: 40.6 KG/M2 | HEIGHT: 60 IN | SYSTOLIC BLOOD PRESSURE: 120 MMHG | DIASTOLIC BLOOD PRESSURE: 72 MMHG | WEIGHT: 206.81 LBS

## 2019-11-07 DIAGNOSIS — Z11.3 SCREEN FOR STD (SEXUALLY TRANSMITTED DISEASE): ICD-10-CM

## 2019-11-07 DIAGNOSIS — N90.89 VULVAR IRRITATION: ICD-10-CM

## 2019-11-07 DIAGNOSIS — Z01.419 WELL WOMAN EXAM WITH ROUTINE GYNECOLOGICAL EXAM: Primary | ICD-10-CM

## 2019-11-07 DIAGNOSIS — Z97.5 NEXPLANON IN PLACE: ICD-10-CM

## 2019-11-07 DIAGNOSIS — Z12.4 PAP SMEAR FOR CERVICAL CANCER SCREENING: ICD-10-CM

## 2019-11-07 DIAGNOSIS — Z01.419 WELL WOMAN EXAM WITH ROUTINE GYNECOLOGICAL EXAM: ICD-10-CM

## 2019-11-07 LAB
C TRACH DNA SPEC QL NAA+PROBE: NOT DETECTED
N GONORRHOEA DNA SPEC QL NAA+PROBE: NOT DETECTED

## 2019-11-07 PROCEDURE — 88141 CYTOPATH C/V INTERPRET: CPT | Mod: ,,, | Performed by: PATHOLOGY

## 2019-11-07 PROCEDURE — 87624 HPV HI-RISK TYP POOLED RSLT: CPT

## 2019-11-07 PROCEDURE — 88175 CYTOPATH C/V AUTO FLUID REDO: CPT | Performed by: PATHOLOGY

## 2019-11-07 PROCEDURE — 99395 PREV VISIT EST AGE 18-39: CPT | Mod: S$PBB,,, | Performed by: NURSE PRACTITIONER

## 2019-11-07 PROCEDURE — 87491 CHLMYD TRACH DNA AMP PROBE: CPT | Mod: 59

## 2019-11-07 PROCEDURE — 88141 LIQUID-BASED PAP SMEAR, SCREENING: ICD-10-PCS | Mod: ,,, | Performed by: PATHOLOGY

## 2019-11-07 PROCEDURE — 87481 CANDIDA DNA AMP PROBE: CPT | Mod: 59

## 2019-11-07 PROCEDURE — 99999 PR PBB SHADOW E&M-EST. PATIENT-LVL III: ICD-10-PCS | Mod: PBBFAC,,, | Performed by: NURSE PRACTITIONER

## 2019-11-07 PROCEDURE — 86592 SYPHILIS TEST NON-TREP QUAL: CPT

## 2019-11-07 PROCEDURE — 99213 OFFICE O/P EST LOW 20 MIN: CPT | Mod: PBBFAC | Performed by: NURSE PRACTITIONER

## 2019-11-07 PROCEDURE — 99999 PR PBB SHADOW E&M-EST. PATIENT-LVL III: CPT | Mod: PBBFAC,,, | Performed by: NURSE PRACTITIONER

## 2019-11-07 PROCEDURE — 99395 PR PREVENTIVE VISIT,EST,18-39: ICD-10-PCS | Mod: S$PBB,,, | Performed by: NURSE PRACTITIONER

## 2019-11-07 PROCEDURE — 36415 COLL VENOUS BLD VENIPUNCTURE: CPT

## 2019-11-07 PROCEDURE — 87661 TRICHOMONAS VAGINALIS AMPLIF: CPT

## 2019-11-07 PROCEDURE — 87801 DETECT AGNT MULT DNA AMPLI: CPT

## 2019-11-07 PROCEDURE — 80074 ACUTE HEPATITIS PANEL: CPT

## 2019-11-07 PROCEDURE — 86703 HIV-1/HIV-2 1 RESULT ANTBDY: CPT

## 2019-11-07 RX ORDER — FLUCONAZOLE 200 MG/1
200 TABLET ORAL
Qty: 2 TABLET | Refills: 0 | Status: SHIPPED | OUTPATIENT
Start: 2019-11-07 | End: 2019-11-07

## 2019-11-07 RX ORDER — CLOTRIMAZOLE AND BETAMETHASONE DIPROPIONATE 10; .64 MG/G; MG/G
CREAM TOPICAL
Qty: 15 G | Refills: 1 | Status: SHIPPED | OUTPATIENT
Start: 2019-11-07 | End: 2020-11-06

## 2019-11-07 RX ORDER — FLUCONAZOLE 200 MG/1
200 TABLET ORAL
Qty: 2 TABLET | Refills: 0 | Status: SHIPPED | OUTPATIENT
Start: 2019-11-07 | End: 2021-11-03

## 2019-11-07 NOTE — PROGRESS NOTES
CC: Annual  HPI: Pt is a 34 y.o.  female who presents for routine annual exam. She uses nexplanon for contraception. She does want STD screening. No issues today. Recent sprained ankle. Had oral sex this week and her partner used a Halls cough drop, now c/o vulvar irritation and burning. Three kids at home, 9 and 7. S/p flu shot.    Last pap in 2016 negative  Nexplanon inserted in 2017    ROS:  GENERAL: Feeling well overall. Denies fever or chills.   SKIN: Denies rash or lesions.   HEAD: Denies head injury or headache.   NODES: Denies enlarged lymph nodes.   CHEST: Denies chest pain or shortness of breath.   CARDIOVASCULAR: Denies palpitations or left sided chest pain.   ABDOMEN: No abdominal pain, constipation, diarrhea, nausea, vomiting or rectal bleeding.   URINARY: No dysuria, hematuria, or burning on urination.  REPRODUCTIVE: See HPI.   BREASTS: Denies pain, lumps, or nipple discharge.   HEMATOLOGIC: No easy bruisability or excessive bleeding.   MUSCULOSKELETAL: Denies joint pain or swelling.   NEUROLOGIC: Denies syncope or weakness.   PSYCHIATRIC: Denies depression, anxiety or mood swings.    PE:   APPEARANCE: Well nourished, well developed, Black or  female in no acute distress.  NODES: no cervical, supraclavicular, or inguinal lymphadenopathy  BREASTS: Symmetrical, no skin changes or visible lesions. No palpable masses, nipple discharge or adenopathy bilaterally.  ABDOMEN: Soft. No tenderness or masses. No distention. No hernias palpated. No CVA tenderness.  VULVA: No lesions. Normal external female genitalia.  URETHRAL MEATUS: Normal size and location, no lesions, no prolapse.  URETHRA: No masses, tenderness, or prolapse.  VAGINA: Moist. No lesions or lacerations noted. No abnormal discharge present. No odor present.   CERVIX: No lesions or discharge. No cervical motion tenderness.   UTERUS: Normal size, regular shape, mobile, non-tender.  ADNEXA: No tenderness. No fullness or masses  palpated in the adnexal regions.   ANUS PERINEUM: Normal.      Diagnosis:  1. Well woman exam with routine gynecological exam    2. Nexplanon in place    3. Pap smear for cervical cancer screening    4. Screen for STD (sexually transmitted disease)    5. Vulvar irritation        Plan:   1. Pap/HPV  2. Nexplanon expires 10/2020  3. Full STD panel  4. Rx Diflucan and Lotrisone    Patient was counseled today on the new ACS guidelines for cervical cytology screening as well as the current recommendations for breast cancer screening. She was counseled to follow up with her PCP for other routine health maintenance. Counseling session lasted approximately 10 minutes, and all her questions were answered.    Follow-up with me in 1 year for routine exam; pap in 3 years.

## 2019-11-08 LAB
BACTERIAL VAGINOSIS DNA: POSITIVE
CANDIDA GLABRATA DNA: NEGATIVE
CANDIDA KRUSEI DNA: NEGATIVE
CANDIDA RRNA VAG QL PROBE: POSITIVE
HAV IGM SERPL QL IA: NEGATIVE
HBV CORE IGM SERPL QL IA: NEGATIVE
HBV SURFACE AG SERPL QL IA: NEGATIVE
HCV AB SERPL QL IA: NEGATIVE
HIV 1+2 AB+HIV1 P24 AG SERPL QL IA: NEGATIVE
RPR SER QL: NORMAL
T VAGINALIS RRNA GENITAL QL PROBE: NEGATIVE

## 2019-11-08 RX ORDER — METRONIDAZOLE 500 MG/1
500 TABLET ORAL EVERY 12 HOURS
Qty: 14 TABLET | Refills: 0 | Status: SHIPPED | OUTPATIENT
Start: 2019-11-08 | End: 2019-11-15

## 2019-11-10 ENCOUNTER — PATIENT MESSAGE (OUTPATIENT)
Dept: OBSTETRICS AND GYNECOLOGY | Facility: CLINIC | Age: 34
End: 2019-11-10

## 2019-11-13 LAB
HPV HR 12 DNA CVX QL NAA+PROBE: NEGATIVE
HPV16 AG SPEC QL: NEGATIVE
HPV18 DNA SPEC QL NAA+PROBE: NEGATIVE

## 2020-08-06 ENCOUNTER — TELEPHONE (OUTPATIENT)
Dept: OBSTETRICS AND GYNECOLOGY | Facility: CLINIC | Age: 35
End: 2020-08-06

## 2020-08-06 NOTE — TELEPHONE ENCOUNTER
----- Message from Alisa Gilbert sent at 8/6/2020  4:03 PM CDT -----  Name of Who is Calling: ONIEL BUCHANAN [[id number]]      What is the request in detail: Pt is calling to speak to staff in regards to scheduling a birth control removal .... Please call to further assist .       Can the clinic reply by [facility]: N      What Number to Call Back if not in [facility]: [phone]

## 2020-08-12 ENCOUNTER — PROCEDURE VISIT (OUTPATIENT)
Dept: OBSTETRICS AND GYNECOLOGY | Facility: CLINIC | Age: 35
End: 2020-08-12
Payer: MEDICAID

## 2020-08-12 VITALS
DIASTOLIC BLOOD PRESSURE: 90 MMHG | HEIGHT: 60 IN | SYSTOLIC BLOOD PRESSURE: 155 MMHG | BODY MASS INDEX: 42.2 KG/M2 | WEIGHT: 214.94 LBS

## 2020-08-12 DIAGNOSIS — Z30.46 NEXPLANON REMOVAL: Primary | ICD-10-CM

## 2020-08-12 PROCEDURE — 11982 REMOVE DRUG IMPLANT DEVICE: CPT | Mod: PBBFAC | Performed by: OBSTETRICS & GYNECOLOGY

## 2020-08-12 PROCEDURE — 11982 REMOVAL OF NEXPLANON DEVICE: ICD-10-PCS | Mod: S$PBB,,, | Performed by: OBSTETRICS & GYNECOLOGY

## 2020-08-12 NOTE — PROCEDURES
Removal of Nexplanon Device    Date/Time: 8/12/2020 4:42 PM  Performed by: Eliazar Parnell Jr., MD  Authorized by: Eliazar Parnell Jr., MD   Preparation: Patient was prepped and draped in the usual sterile fashion.  Local anesthesia used: yes    Anesthesia:  Local anesthesia used: yes  Local Anesthetic: lidocaine 1% with epinephrine  Anesthetic total: 3 mL    Sedation:  Patient sedated: no    Patient tolerance: patient tolerated the procedure well with no immediate complications          .[email or web address]

## 2020-09-28 ENCOUNTER — TELEPHONE (OUTPATIENT)
Dept: OBSTETRICS AND GYNECOLOGY | Facility: CLINIC | Age: 35
End: 2020-09-28

## 2020-09-28 NOTE — TELEPHONE ENCOUNTER
Returned pateint call unable to reach patient left vm     ----- Message from My Zarco sent at 9/28/2020  2:12 PM CDT -----  Regarding: Patient Access  Name of Who is Calling:  Dipti Villafana      What is the request in detail:   Patient called requesting to schedule her annual wellness. I did attempt to schedule but was unsuccessful. Please give a call and further advise.      Reply by [facility]: no    Call Back :  [phone]

## 2020-11-20 ENCOUNTER — OFFICE VISIT (OUTPATIENT)
Dept: OBSTETRICS AND GYNECOLOGY | Facility: CLINIC | Age: 35
End: 2020-11-20
Payer: MEDICAID

## 2020-11-20 VITALS
WEIGHT: 203.94 LBS | DIASTOLIC BLOOD PRESSURE: 84 MMHG | SYSTOLIC BLOOD PRESSURE: 140 MMHG | HEIGHT: 60 IN | BODY MASS INDEX: 40.04 KG/M2

## 2020-11-20 DIAGNOSIS — Z01.419 WOMEN'S ANNUAL ROUTINE GYNECOLOGICAL EXAMINATION: Primary | ICD-10-CM

## 2020-11-20 PROCEDURE — 99999 PR PBB SHADOW E&M-EST. PATIENT-LVL III: ICD-10-PCS | Mod: PBBFAC,,, | Performed by: ADVANCED PRACTICE MIDWIFE

## 2020-11-20 PROCEDURE — 99999 PR PBB SHADOW E&M-EST. PATIENT-LVL III: CPT | Mod: PBBFAC,,, | Performed by: ADVANCED PRACTICE MIDWIFE

## 2020-11-20 PROCEDURE — 99395 PR PREVENTIVE VISIT,EST,18-39: ICD-10-PCS | Mod: S$PBB,,, | Performed by: ADVANCED PRACTICE MIDWIFE

## 2020-11-20 PROCEDURE — 99395 PREV VISIT EST AGE 18-39: CPT | Mod: S$PBB,,, | Performed by: ADVANCED PRACTICE MIDWIFE

## 2020-11-20 PROCEDURE — 99213 OFFICE O/P EST LOW 20 MIN: CPT | Mod: PBBFAC,PN | Performed by: ADVANCED PRACTICE MIDWIFE

## 2020-11-20 NOTE — PROGRESS NOTES
HISTORY OF PRESENT ILLNESS:    Dipti Villafana is a 35 y.o. female, , Patient's last menstrual period was 2020.,  presents for a routine annual exam . Pt has no complaints or concerns. Pt desires STD testing. Pt declines BCM.    History reviewed. No pertinent past medical history.    History reviewed. No pertinent surgical history.    MEDICATIONS AND ALLERGIES:      Current Outpatient Medications:     citalopram (CELEXA) 20 MG tablet, TK 1 T PO QD, Disp: , Rfl: 3    etonogestrel (NEXPLANON) 68 mg Impl subdermal device, Nexplanon 68 mg subdermal implant  Inject by subcutaneous route., Disp: , Rfl:     fluconazole (DIFLUCAN) 200 MG Tab, Take 1 tablet (200 mg total) by mouth every 72 hours as needed. (Patient not taking: Reported on 2020), Disp: 2 tablet, Rfl: 0    Review of patient's allergies indicates:  No Known Allergies    Family History   Problem Relation Age of Onset    Breast cancer Neg Hx     Colon cancer Neg Hx     Ovarian cancer Neg Hx        Social History     Socioeconomic History    Marital status: Single     Spouse name: Not on file    Number of children: Not on file    Years of education: Not on file    Highest education level: Not on file   Occupational History    Not on file   Social Needs    Financial resource strain: Not on file    Food insecurity     Worry: Not on file     Inability: Not on file    Transportation needs     Medical: Not on file     Non-medical: Not on file   Tobacco Use    Smoking status: Never Smoker    Smokeless tobacco: Never Used   Substance and Sexual Activity    Alcohol use: Yes     Comment: occasional    Drug use: No    Sexual activity: Not Currently     Partners: Male     Birth control/protection: None     Comment: Mirena 2012   Lifestyle    Physical activity     Days per week: Not on file     Minutes per session: Not on file    Stress: Not on file   Relationships    Social connections     Talks on phone: Not on file     Gets together:  Not on file     Attends [employer] service: Not on file     Active member of club or organization: Not on file     Attends meetings of clubs or organizations: Not on file     Relationship status: Not on file   Other Topics Concern    Not on file   Social History Narrative    Not on file       COMPREHENSIVE GYN HISTORY:  PAP History: Denies abnormal Paps.  Infection History: Denies STDs. Denies PID.  Benign History: Denies uterine fibroids. Denies ovarian cysts. Denies endometriosis. Denies other conditions.  Cancer History: Denies cervical cancer. Denies uterine cancer or hyperplasia. Denies ovarian cancer. Denies vulvar cancer or pre-cancer. Denies vaginal cancer or pre-cancer. Denies breast cancer. Denies colon cancer.  Sexual Activity History:  currently  sexually active  Menstrual History: Monthly  Dysmenorrhea History:None  Contraception:Declines    ROS:  GENERAL: No weight changes. No swelling. No fatigue. No fever.  CARDIOVASCULAR: No chest pain. No shortness of breath. No leg cramps.   NEUROLOGICAL: No headaches. No vision changes.  BREASTS: No pain. No lumps. No discharge.  ABDOMEN: No pain. No nausea. No vomiting. No diarrhea. No constipation.  REPRODUCTIVE: No abnormal bleeding.   VULVA: No pain. No lesions. No itching.  VAGINA: No relaxation. No itching. No odor. No discharge. No lesions.  URINARY: No incontinence. No nocturia. No frequency. No dysuria.    BP (!) 140/84 (BP Location: Left arm)   Ht 5' (1.524 m)   Wt 92.5 kg (203 lb 14.8 oz)   LMP 11/04/2020   BMI 39.83 kg/m²     PE:  APPEARANCE: Well nourished, well developed, in no acute distress.  AFFECT: WNL, alert and oriented x 3. Interactive during exam  SKIN: No acne or hirsutism.  NECK: Neck symmetric, without masses or thyromegaly.  NODES: No inguinal, axillary or femoral lymph node enlargement.  CHEST: Good respiratory effort.   ABDOMEN: Soft. No tenderness or masses. No hepatosplenomegaly. No hernias.  BREASTS: Symmetrical, no skin  changes, visible lesions, palpable masses or nipple discharge bilaterally.  PELVIC: External female genitalia without lesions.  Female hair distribution. Adequate perineal body, Normal urethral meatus. Vagina moist and well rugated without lesions or discharge.  No significant cystocele or rectocele present. Cervix pink without lesions, discharge or tenderness. Uterus is 4-6 week size, regular, mobile and nontender. Adnexa without masses or tenderness.  EXTREMITIES: No edema    PROCEDURES:      DIAGNOSIS:  1. Gyn exam    LABS AND TESTS ORDERED: STD testing    MEDICATIONS PRESCRIBED:None  COUNSELING:  The patient was counseled today on:  -A.C.S. Pap and pelvic exam guidelines, monthly self breast exams and to follow up with her PCP for other health maintenance.    FOLLOW-UP with me annually.

## 2020-11-22 LAB
C TRACH RRNA SPEC QL NAA+PROBE: NEGATIVE
N GONORRHOEA RRNA SPEC QL NAA+PROBE: NEGATIVE

## 2020-11-23 ENCOUNTER — PROCEDURE VISIT (OUTPATIENT)
Dept: OBSTETRICS AND GYNECOLOGY | Facility: CLINIC | Age: 35
End: 2020-11-23
Payer: MEDICAID

## 2020-11-23 DIAGNOSIS — Z01.419 WOMEN'S ANNUAL ROUTINE GYNECOLOGICAL EXAMINATION: ICD-10-CM

## 2020-11-23 PROCEDURE — 86703 HIV-1/HIV-2 1 RESULT ANTBDY: CPT

## 2020-11-23 PROCEDURE — 87340 HEPATITIS B SURFACE AG IA: CPT

## 2020-11-23 PROCEDURE — 86592 SYPHILIS TEST NON-TREP QUAL: CPT

## 2020-11-24 LAB — RPR SER QL: NORMAL

## 2020-11-25 ENCOUNTER — PATIENT MESSAGE (OUTPATIENT)
Dept: OBSTETRICS AND GYNECOLOGY | Facility: CLINIC | Age: 35
End: 2020-11-25

## 2020-11-25 LAB
HBV SURFACE AG SERPL QL IA: NEGATIVE
HIV 1+2 AB+HIV1 P24 AG SERPL QL IA: NEGATIVE

## 2020-12-02 ENCOUNTER — PATIENT MESSAGE (OUTPATIENT)
Dept: OBSTETRICS AND GYNECOLOGY | Facility: CLINIC | Age: 35
End: 2020-12-02

## 2020-12-10 NOTE — PROGRESS NOTES
Lab Documentation:    Order Type: Written Order placed in [facility]    Patient in for lab visit only per provider treatment plan.

## 2021-04-22 ENCOUNTER — PATIENT MESSAGE (OUTPATIENT)
Dept: UROLOGY | Facility: CLINIC | Age: 36
End: 2021-04-22

## 2021-11-03 ENCOUNTER — OFFICE VISIT (OUTPATIENT)
Dept: OBSTETRICS AND GYNECOLOGY | Facility: CLINIC | Age: 36
End: 2021-11-03
Attending: OBSTETRICS & GYNECOLOGY
Payer: MEDICAID

## 2021-11-03 ENCOUNTER — PROCEDURE VISIT (OUTPATIENT)
Dept: OBSTETRICS AND GYNECOLOGY | Facility: CLINIC | Age: 36
End: 2021-11-03
Payer: MEDICAID

## 2021-11-03 VITALS
HEIGHT: 60 IN | DIASTOLIC BLOOD PRESSURE: 72 MMHG | WEIGHT: 189.38 LBS | SYSTOLIC BLOOD PRESSURE: 120 MMHG | BODY MASS INDEX: 37.18 KG/M2

## 2021-11-03 DIAGNOSIS — Z11.3 SCREENING EXAMINATION FOR STD (SEXUALLY TRANSMITTED DISEASE): ICD-10-CM

## 2021-11-03 DIAGNOSIS — Z01.419 WELL WOMAN EXAM: Primary | ICD-10-CM

## 2021-11-03 DIAGNOSIS — N89.8 VAGINAL DISCHARGE: ICD-10-CM

## 2021-11-03 PROBLEM — Z97.5 NEXPLANON IN PLACE: Status: RESOLVED | Noted: 2017-12-12 | Resolved: 2021-11-03

## 2021-11-03 PROBLEM — F32.A DEPRESSIVE DISORDER: Status: ACTIVE | Noted: 2019-05-24

## 2021-11-03 PROCEDURE — 86592 SYPHILIS TEST NON-TREP QUAL: CPT

## 2021-11-03 PROCEDURE — 86803 HEPATITIS C AB TEST: CPT

## 2021-11-03 PROCEDURE — 99999 PR PBB SHADOW E&M-EST. PATIENT-LVL II: ICD-10-PCS | Mod: PBBFAC,,, | Performed by: OBSTETRICS & GYNECOLOGY

## 2021-11-03 PROCEDURE — 87491 CHLMYD TRACH DNA AMP PROBE: CPT | Mod: 59

## 2021-11-03 PROCEDURE — 99395 PR PREVENTIVE VISIT,EST,18-39: ICD-10-PCS | Mod: S$PBB,,, | Performed by: OBSTETRICS & GYNECOLOGY

## 2021-11-03 PROCEDURE — 87389 HIV-1 AG W/HIV-1&-2 AB AG IA: CPT

## 2021-11-03 PROCEDURE — 87481 CANDIDA DNA AMP PROBE: CPT | Mod: 59

## 2021-11-03 PROCEDURE — 87591 N.GONORRHOEAE DNA AMP PROB: CPT | Mod: 59

## 2021-11-03 PROCEDURE — 99212 OFFICE O/P EST SF 10 MIN: CPT | Mod: PBBFAC,PN | Performed by: OBSTETRICS & GYNECOLOGY

## 2021-11-03 PROCEDURE — 99395 PREV VISIT EST AGE 18-39: CPT | Mod: S$PBB,,, | Performed by: OBSTETRICS & GYNECOLOGY

## 2021-11-03 PROCEDURE — 99999 PR PBB SHADOW E&M-EST. PATIENT-LVL II: CPT | Mod: PBBFAC,,, | Performed by: OBSTETRICS & GYNECOLOGY

## 2021-11-03 RX ORDER — FERROUS SULFATE TAB 325 MG (65 MG ELEMENTAL FE) 325 (65 FE) MG
1 TAB ORAL 2 TIMES DAILY
COMMUNITY
Start: 2021-10-06

## 2021-11-03 RX ORDER — SERTRALINE HYDROCHLORIDE 50 MG/1
50 TABLET, FILM COATED ORAL DAILY
COMMUNITY
Start: 2021-10-06 | End: 2022-10-05

## 2021-11-04 ENCOUNTER — PATIENT MESSAGE (OUTPATIENT)
Dept: OBSTETRICS AND GYNECOLOGY | Facility: CLINIC | Age: 36
End: 2021-11-04
Payer: MEDICAID

## 2021-11-04 LAB
HCV AB SERPL QL IA: NEGATIVE
HIV 1+2 AB+HIV1 P24 AG SERPL QL IA: NEGATIVE
RPR SER QL: NORMAL

## 2021-11-05 LAB
C TRACH DNA SPEC QL NAA+PROBE: NOT DETECTED
N GONORRHOEA DNA SPEC QL NAA+PROBE: NOT DETECTED

## 2021-11-11 LAB
BACTERIAL VAGINOSIS DNA: NEGATIVE
CANDIDA GLABRATA DNA: NEGATIVE
CANDIDA KRUSEI DNA: NEGATIVE
CANDIDA RRNA VAG QL PROBE: NEGATIVE
T VAGINALIS RRNA GENITAL QL PROBE: NEGATIVE

## 2022-01-21 ENCOUNTER — LAB VISIT (OUTPATIENT)
Dept: LAB | Facility: OTHER | Age: 37
End: 2022-01-21
Attending: ADVANCED PRACTICE MIDWIFE
Payer: MEDICAID

## 2022-01-21 ENCOUNTER — OFFICE VISIT (OUTPATIENT)
Dept: OBSTETRICS AND GYNECOLOGY | Facility: CLINIC | Age: 37
End: 2022-01-21
Payer: MEDICAID

## 2022-01-21 VITALS
HEIGHT: 60 IN | BODY MASS INDEX: 40.52 KG/M2 | WEIGHT: 206.38 LBS | SYSTOLIC BLOOD PRESSURE: 128 MMHG | DIASTOLIC BLOOD PRESSURE: 84 MMHG

## 2022-01-21 DIAGNOSIS — Z34.90 PREGNANCY, UNSPECIFIED GESTATIONAL AGE: ICD-10-CM

## 2022-01-21 DIAGNOSIS — N91.2 AMENORRHEA: ICD-10-CM

## 2022-01-21 DIAGNOSIS — Z32.00 POSSIBLE PREGNANCY: Primary | ICD-10-CM

## 2022-01-21 DIAGNOSIS — F32.A DEPRESSIVE DISORDER: ICD-10-CM

## 2022-01-21 DIAGNOSIS — O09.529 ANTEPARTUM MULTIGRAVIDA OF ADVANCED MATERNAL AGE: ICD-10-CM

## 2022-01-21 LAB
ABO + RH BLD: NORMAL
B-HCG UR QL: POSITIVE
BASOPHILS # BLD AUTO: 0.03 K/UL (ref 0–0.2)
BASOPHILS NFR BLD: 0.4 % (ref 0–1.9)
BLD GP AB SCN CELLS X3 SERPL QL: NORMAL
CTP QC/QA: YES
DIFFERENTIAL METHOD: ABNORMAL
EOSINOPHIL # BLD AUTO: 0.1 K/UL (ref 0–0.5)
EOSINOPHIL NFR BLD: 1.5 % (ref 0–8)
ERYTHROCYTE [DISTWIDTH] IN BLOOD BY AUTOMATED COUNT: 13.9 % (ref 11.5–14.5)
HCT VFR BLD AUTO: 39.5 % (ref 37–48.5)
HGB BLD-MCNC: 12.5 G/DL (ref 12–16)
IMM GRANULOCYTES # BLD AUTO: 0.02 K/UL (ref 0–0.04)
IMM GRANULOCYTES NFR BLD AUTO: 0.3 % (ref 0–0.5)
LYMPHOCYTES # BLD AUTO: 2 K/UL (ref 1–4.8)
LYMPHOCYTES NFR BLD: 24.6 % (ref 18–48)
MCH RBC QN AUTO: 26.3 PG (ref 27–31)
MCHC RBC AUTO-ENTMCNC: 31.6 G/DL (ref 32–36)
MCV RBC AUTO: 83 FL (ref 82–98)
MONOCYTES # BLD AUTO: 0.8 K/UL (ref 0.3–1)
MONOCYTES NFR BLD: 9.8 % (ref 4–15)
NEUTROPHILS # BLD AUTO: 5.1 K/UL (ref 1.8–7.7)
NEUTROPHILS NFR BLD: 63.4 % (ref 38–73)
NRBC BLD-RTO: 0 /100 WBC
PLATELET # BLD AUTO: 329 K/UL (ref 150–450)
PMV BLD AUTO: 9.2 FL (ref 9.2–12.9)
RBC # BLD AUTO: 4.76 M/UL (ref 4–5.4)
WBC # BLD AUTO: 7.98 K/UL (ref 3.9–12.7)

## 2022-01-21 PROCEDURE — 3074F PR MOST RECENT SYSTOLIC BLOOD PRESSURE < 130 MM HG: ICD-10-PCS | Mod: CPTII,,, | Performed by: ADVANCED PRACTICE MIDWIFE

## 2022-01-21 PROCEDURE — 86762 RUBELLA ANTIBODY: CPT | Performed by: ADVANCED PRACTICE MIDWIFE

## 2022-01-21 PROCEDURE — 99213 OFFICE O/P EST LOW 20 MIN: CPT | Mod: PBBFAC,TH,PN | Performed by: ADVANCED PRACTICE MIDWIFE

## 2022-01-21 PROCEDURE — 87491 CHLMYD TRACH DNA AMP PROBE: CPT | Performed by: ADVANCED PRACTICE MIDWIFE

## 2022-01-21 PROCEDURE — 99214 OFFICE O/P EST MOD 30 MIN: CPT | Mod: TH,S$PBB,, | Performed by: ADVANCED PRACTICE MIDWIFE

## 2022-01-21 PROCEDURE — 86592 SYPHILIS TEST NON-TREP QUAL: CPT | Performed by: ADVANCED PRACTICE MIDWIFE

## 2022-01-21 PROCEDURE — 81025 URINE PREGNANCY TEST: CPT | Mod: PBBFAC,PN | Performed by: ADVANCED PRACTICE MIDWIFE

## 2022-01-21 PROCEDURE — 1159F MED LIST DOCD IN RCRD: CPT | Mod: CPTII,,, | Performed by: ADVANCED PRACTICE MIDWIFE

## 2022-01-21 PROCEDURE — 3008F PR BODY MASS INDEX (BMI) DOCUMENTED: ICD-10-PCS | Mod: CPTII,,, | Performed by: ADVANCED PRACTICE MIDWIFE

## 2022-01-21 PROCEDURE — 3074F SYST BP LT 130 MM HG: CPT | Mod: CPTII,,, | Performed by: ADVANCED PRACTICE MIDWIFE

## 2022-01-21 PROCEDURE — 87591 N.GONORRHOEAE DNA AMP PROB: CPT | Performed by: ADVANCED PRACTICE MIDWIFE

## 2022-01-21 PROCEDURE — 86850 RBC ANTIBODY SCREEN: CPT | Performed by: ADVANCED PRACTICE MIDWIFE

## 2022-01-21 PROCEDURE — 99999 PR PBB SHADOW E&M-EST. PATIENT-LVL III: CPT | Mod: PBBFAC,,, | Performed by: ADVANCED PRACTICE MIDWIFE

## 2022-01-21 PROCEDURE — 87389 HIV-1 AG W/HIV-1&-2 AB AG IA: CPT | Performed by: ADVANCED PRACTICE MIDWIFE

## 2022-01-21 PROCEDURE — 99999 PR PBB SHADOW E&M-EST. PATIENT-LVL III: ICD-10-PCS | Mod: PBBFAC,,, | Performed by: ADVANCED PRACTICE MIDWIFE

## 2022-01-21 PROCEDURE — 3079F PR MOST RECENT DIASTOLIC BLOOD PRESSURE 80-89 MM HG: ICD-10-PCS | Mod: CPTII,,, | Performed by: ADVANCED PRACTICE MIDWIFE

## 2022-01-21 PROCEDURE — 3008F BODY MASS INDEX DOCD: CPT | Mod: CPTII,,, | Performed by: ADVANCED PRACTICE MIDWIFE

## 2022-01-21 PROCEDURE — 3079F DIAST BP 80-89 MM HG: CPT | Mod: CPTII,,, | Performed by: ADVANCED PRACTICE MIDWIFE

## 2022-01-21 PROCEDURE — 80074 ACUTE HEPATITIS PANEL: CPT | Performed by: ADVANCED PRACTICE MIDWIFE

## 2022-01-21 PROCEDURE — 1159F PR MEDICATION LIST DOCUMENTED IN MEDICAL RECORD: ICD-10-PCS | Mod: CPTII,,, | Performed by: ADVANCED PRACTICE MIDWIFE

## 2022-01-21 PROCEDURE — 86787 VARICELLA-ZOSTER ANTIBODY: CPT | Performed by: ADVANCED PRACTICE MIDWIFE

## 2022-01-21 PROCEDURE — 36415 COLL VENOUS BLD VENIPUNCTURE: CPT | Performed by: ADVANCED PRACTICE MIDWIFE

## 2022-01-21 PROCEDURE — 99214 PR OFFICE/OUTPT VISIT, EST, LEVL IV, 30-39 MIN: ICD-10-PCS | Mod: TH,S$PBB,, | Performed by: ADVANCED PRACTICE MIDWIFE

## 2022-01-21 PROCEDURE — 85025 COMPLETE CBC W/AUTO DIFF WBC: CPT | Performed by: ADVANCED PRACTICE MIDWIFE

## 2022-01-21 PROCEDURE — 87086 URINE CULTURE/COLONY COUNT: CPT | Performed by: ADVANCED PRACTICE MIDWIFE

## 2022-01-22 ENCOUNTER — PATIENT MESSAGE (OUTPATIENT)
Dept: OBSTETRICS AND GYNECOLOGY | Facility: CLINIC | Age: 37
End: 2022-01-22
Payer: MEDICAID

## 2022-01-22 DIAGNOSIS — Z34.90 PREGNANCY, UNSPECIFIED GESTATIONAL AGE: Primary | ICD-10-CM

## 2022-01-23 ENCOUNTER — PATIENT MESSAGE (OUTPATIENT)
Dept: OBSTETRICS AND GYNECOLOGY | Facility: CLINIC | Age: 37
End: 2022-01-23
Payer: MEDICAID

## 2022-01-23 DIAGNOSIS — Z34.90 PREGNANCY, UNSPECIFIED GESTATIONAL AGE: Primary | ICD-10-CM

## 2022-01-23 LAB — BACTERIA UR CULT: NO GROWTH

## 2022-01-24 ENCOUNTER — PATIENT MESSAGE (OUTPATIENT)
Dept: OBSTETRICS AND GYNECOLOGY | Facility: CLINIC | Age: 37
End: 2022-01-24
Payer: MEDICAID

## 2022-01-24 LAB
HAV IGM SERPL QL IA: NEGATIVE
HBV CORE IGM SERPL QL IA: NEGATIVE
HBV SURFACE AG SERPL QL IA: NEGATIVE
HCV AB SERPL QL IA: NEGATIVE
HIV 1+2 AB+HIV1 P24 AG SERPL QL IA: NEGATIVE
RPR SER QL: NORMAL
RUBV IGG SER-ACNC: 17.6 IU/ML
RUBV IGG SER-IMP: REACTIVE
VARICELLA INTERPRETATION: POSITIVE
VARICELLA ZOSTER IGG: 3.33 ISR (ref 0–0.9)

## 2022-01-25 LAB
C TRACH DNA SPEC QL NAA+PROBE: NOT DETECTED
N GONORRHOEA DNA SPEC QL NAA+PROBE: NOT DETECTED

## 2022-01-26 ENCOUNTER — PROCEDURE VISIT (OUTPATIENT)
Dept: OBSTETRICS AND GYNECOLOGY | Facility: CLINIC | Age: 37
End: 2022-01-26
Payer: MEDICAID

## 2022-01-26 DIAGNOSIS — Z34.90 PREGNANCY, UNSPECIFIED GESTATIONAL AGE: ICD-10-CM

## 2022-01-26 PROCEDURE — 76801 OB US < 14 WKS SINGLE FETUS: CPT | Mod: PBBFAC | Performed by: OBSTETRICS & GYNECOLOGY

## 2022-01-26 PROCEDURE — 76801 US OB/GYN PROCEDURE (VIEWPOINT): ICD-10-PCS | Mod: 26,S$PBB,, | Performed by: OBSTETRICS & GYNECOLOGY

## 2022-01-31 NOTE — PROGRESS NOTES
Chief Complaint: Missed Period    HPI: Dipti Villafana is a 36 y.o., , reporting absence of menses with  LMP of 21. She currently denies any vaginal bleeding, leaking fluids, or abnormal vaginal discharge.A      Past Medical History:   Diagnosis Date    Anemia      History reviewed. No pertinent surgical history.  Social History     Tobacco Use    Smoking status: Never Smoker    Smokeless tobacco: Never Used   Substance Use Topics    Alcohol use: Not Currently     Comment: occasional    Drug use: No     Family History   Problem Relation Age of Onset    Diabetes Father     Hypertension Mother     Diabetes Mother     Heart failure Sister     Breast cancer Neg Hx     Colon cancer Neg Hx     Ovarian cancer Neg Hx      OB History    Para Term  AB Living   3 2 0 0 0 3   SAB IAB Ectopic Multiple Live Births   0 0 0 1 3      # Outcome Date GA Lbr Jaspreet/2nd Weight Sex Delivery Anes PTL Lv   3 Current            2 Para 12   2.722 kg (6 lb) M Vag-Spont   JUAN CARLOS   1A Para 03/12/10    M Vag-Spont   JUAN CARLOS      Birth Comments: twin   1B Para 03/12/10    M Vag-Spont  N JUAN CARLOS      Birth Comments: twin       /84   Ht 5' (1.524 m)   Wt 93.6 kg (206 lb 5.6 oz)   LMP 2021   BMI 40.30 kg/m²       ROS   Systemic: No fever chills   Gastrointestinal: No diarrhea or constipation   Genitourinary: No dysuria. No Pelvic Pain  Skin: No rash.      Physical Exam:   Vital Signs:° Normal.  General Appearance:° Well developed.  ° Well nourished.  Neurological:° No disorientation was observed.  Psychiatric: Affect: ° Normal.    Assessment/Plan  1. Confirmation of pregnancy--UPT in office positive, per LMP of 21, EGA 8wks 2d gestation with SANDRA 22.   2. Dating scan and NOB labs ordered   3. Discussed AMA and option for genetic screening- NT ordered  4. ACOG teaching and brochures provided   Connected Moms-- discussed/education provided with handout.     RTC 4 weeks

## 2022-02-17 ENCOUNTER — PATIENT MESSAGE (OUTPATIENT)
Dept: MATERNAL FETAL MEDICINE | Facility: CLINIC | Age: 37
End: 2022-02-17
Payer: MEDICAID

## 2022-02-18 ENCOUNTER — INITIAL PRENATAL (OUTPATIENT)
Dept: OBSTETRICS AND GYNECOLOGY | Facility: CLINIC | Age: 37
End: 2022-02-18
Payer: MEDICAID

## 2022-02-18 ENCOUNTER — PROCEDURE VISIT (OUTPATIENT)
Dept: MATERNAL FETAL MEDICINE | Facility: CLINIC | Age: 37
End: 2022-02-18
Payer: MEDICAID

## 2022-02-18 ENCOUNTER — LAB VISIT (OUTPATIENT)
Dept: LAB | Facility: OTHER | Age: 37
End: 2022-02-18
Attending: ADVANCED PRACTICE MIDWIFE
Payer: MEDICAID

## 2022-02-18 VITALS
WEIGHT: 207.25 LBS | DIASTOLIC BLOOD PRESSURE: 82 MMHG | SYSTOLIC BLOOD PRESSURE: 124 MMHG | BODY MASS INDEX: 40.47 KG/M2

## 2022-02-18 DIAGNOSIS — Z36.82 ENCOUNTER FOR ANTENATAL SCREENING FOR NUCHAL TRANSLUCENCY: ICD-10-CM

## 2022-02-18 DIAGNOSIS — Z36.82 ENCOUNTER FOR ANTENATAL SCREENING FOR NUCHAL TRANSLUCENCY: Primary | ICD-10-CM

## 2022-02-18 DIAGNOSIS — O09.522 SUPERVISION OF HIGH RISK ELDERLY MULTIGRAVIDA IN SECOND TRIMESTER: Primary | ICD-10-CM

## 2022-02-18 DIAGNOSIS — Z34.90 PREGNANCY, UNSPECIFIED GESTATIONAL AGE: ICD-10-CM

## 2022-02-18 PROCEDURE — 99212 PR OFFICE/OUTPT VISIT, EST, LEVL II, 10-19 MIN: ICD-10-PCS | Mod: TH,S$PBB,, | Performed by: ADVANCED PRACTICE MIDWIFE

## 2022-02-18 PROCEDURE — 81508 FTL CGEN ABNOR TWO PROTEINS: CPT | Performed by: ADVANCED PRACTICE MIDWIFE

## 2022-02-18 PROCEDURE — 99212 OFFICE O/P EST SF 10 MIN: CPT | Mod: TH,S$PBB,, | Performed by: ADVANCED PRACTICE MIDWIFE

## 2022-02-18 PROCEDURE — 99212 OFFICE O/P EST SF 10 MIN: CPT | Mod: PBBFAC,TH,PN | Performed by: ADVANCED PRACTICE MIDWIFE

## 2022-02-18 PROCEDURE — 36415 COLL VENOUS BLD VENIPUNCTURE: CPT | Performed by: ADVANCED PRACTICE MIDWIFE

## 2022-02-18 PROCEDURE — 99999 PR PBB SHADOW E&M-EST. PATIENT-LVL II: ICD-10-PCS | Mod: PBBFAC,,, | Performed by: ADVANCED PRACTICE MIDWIFE

## 2022-02-18 PROCEDURE — 76813 US MFM PROCEDURE (VIEWPOINT): ICD-10-PCS | Mod: 26,S$PBB,, | Performed by: OBSTETRICS & GYNECOLOGY

## 2022-02-18 PROCEDURE — 99999 PR PBB SHADOW E&M-EST. PATIENT-LVL II: CPT | Mod: PBBFAC,,, | Performed by: ADVANCED PRACTICE MIDWIFE

## 2022-02-18 PROCEDURE — 76817 TRANSVAGINAL US OBSTETRIC: CPT | Mod: 26,S$PBB,, | Performed by: OBSTETRICS & GYNECOLOGY

## 2022-02-18 PROCEDURE — 76813 OB US NUCHAL MEAS 1 GEST: CPT | Mod: PBBFAC | Performed by: OBSTETRICS & GYNECOLOGY

## 2022-02-18 PROCEDURE — 76817 US MFM PROCEDURE (VIEWPOINT): ICD-10-PCS | Mod: 26,S$PBB,, | Performed by: OBSTETRICS & GYNECOLOGY

## 2022-02-18 PROCEDURE — 76813 OB US NUCHAL MEAS 1 GEST: CPT | Mod: 26,S$PBB,, | Performed by: OBSTETRICS & GYNECOLOGY

## 2022-02-18 NOTE — PROGRESS NOTES
Reason for visit: Initial Prenatal Visit      HPI:   36 y.o., at 12w2d by Estimated Date of Delivery: 22    In with report of spotting p intercourse    - Contractions: denies  - Bleeding: spotting last week  - Loss of fluid: denies  - Fetal movement: none  - Nausea: no  - Vomiting: no  - Headache: no      Reviewed:    Past medical, surgical, social, family, and obstetric history: Reviewed and updated in EMR.  Medications: Reviewed and updated in EMR.  Allergies: Patient has no known allergies.    Pregnancy dating, labs, ultrasound reports, prenatal testing, and problem list: Reviewed and updated in EMR.  Outside records: na  Independent interpretation of tests: na  Discussion with another healthcare professional: na      Vitals: /82   Wt 94 kg (207 lb 3.7 oz)   LMP 2021   BMI 40.47 kg/m²     Physical exam:  GENERAL: No acute distress  ABD: Gravid      Assessment and Plan:    There are no diagnoses linked to this encounter.      Labs reviewed  OB needs with none identified  WIC form provided  Had NT today   labor precautions given  Follow-up: 4 weeks      I spent a total of 15   minutes on the day of the visit. This includes face to face time and non-face to face time preparing to see the patient (eg, review of tests), Obtaining and/or reviewing separately obtained history, Documenting clinical information in the electronic or other health record, Independently interpreting results and communicating results to the patient/family/caregiver, or Care coordination.

## 2022-02-21 LAB
# FETUSES US: NORMAL
AGE AT DELIVERY: 39
B-HCG MOM SERPL: NORMAL
B-HCG SERPL-ACNC: 83 IU/ML
FET CRL US.MEAS: 57.1 MM
FET NASAL BONE LENGTH US.MEAS: NORMAL MM
FET NUCHAL FOLD MOM THICKNESS US.MEAS: NORMAL
FET NUCHAL FOLD THICKNESS US.MEAS: 1.2 MM
FET TS 21 RISK FROM MAT AGE: NORMAL
GA (DAYS): 2 D
GA (WEEKS): 12 WK
IDDM PATIENT QL: NORMAL
INTEGRATED SCN PATIENT-IMP NAR: NORMAL
INTEGRATED SCN PATIENT-IMP: NEGATIVE
PAPP-A MOM SERPL: NORMAL
PAPP-A SERPL-MCNC: 931.6 NG/ML
SMOKING STATUS FTND: NO
TS 18 RISK FETUS: NORMAL
TS 21 RISK FETUS: NORMAL
US DATE: NORMAL

## 2022-02-23 ENCOUNTER — PATIENT MESSAGE (OUTPATIENT)
Dept: ADMINISTRATIVE | Facility: OTHER | Age: 37
End: 2022-02-23
Payer: MEDICAID

## 2022-03-11 DIAGNOSIS — Z36.2 ENCOUNTER FOR FOLLOW-UP ULTRASOUND OF FETAL ANATOMY: Primary | ICD-10-CM

## 2022-03-18 ENCOUNTER — LAB VISIT (OUTPATIENT)
Dept: LAB | Facility: OTHER | Age: 37
End: 2022-03-18
Payer: MEDICAID

## 2022-03-18 ENCOUNTER — ROUTINE PRENATAL (OUTPATIENT)
Dept: OBSTETRICS AND GYNECOLOGY | Facility: CLINIC | Age: 37
End: 2022-03-18
Payer: MEDICAID

## 2022-03-18 VITALS
BODY MASS INDEX: 40.47 KG/M2 | WEIGHT: 207.25 LBS | SYSTOLIC BLOOD PRESSURE: 128 MMHG | DIASTOLIC BLOOD PRESSURE: 88 MMHG

## 2022-03-18 DIAGNOSIS — O21.9 NAUSEA AND VOMITING IN PREGNANCY: Primary | ICD-10-CM

## 2022-03-18 DIAGNOSIS — E66.01 MORBID OBESITY WITH BMI OF 40.0-44.9, ADULT: ICD-10-CM

## 2022-03-18 DIAGNOSIS — O09.92 SUPERVISION OF HIGH RISK PREGNANCY IN SECOND TRIMESTER: ICD-10-CM

## 2022-03-18 DIAGNOSIS — O09.522 MULTIGRAVIDA OF ADVANCED MATERNAL AGE IN SECOND TRIMESTER: ICD-10-CM

## 2022-03-18 PROCEDURE — 81511 FTL CGEN ABNOR FOUR ANAL: CPT | Performed by: ADVANCED PRACTICE MIDWIFE

## 2022-03-18 PROCEDURE — 99213 OFFICE O/P EST LOW 20 MIN: CPT | Mod: TH,S$PBB,, | Performed by: ADVANCED PRACTICE MIDWIFE

## 2022-03-18 PROCEDURE — 99213 PR OFFICE/OUTPT VISIT, EST, LEVL III, 20-29 MIN: ICD-10-PCS | Mod: TH,S$PBB,, | Performed by: ADVANCED PRACTICE MIDWIFE

## 2022-03-18 PROCEDURE — 99212 OFFICE O/P EST SF 10 MIN: CPT | Mod: PBBFAC,TH,PN | Performed by: ADVANCED PRACTICE MIDWIFE

## 2022-03-18 PROCEDURE — 36415 COLL VENOUS BLD VENIPUNCTURE: CPT | Performed by: ADVANCED PRACTICE MIDWIFE

## 2022-03-18 PROCEDURE — 99999 PR PBB SHADOW E&M-EST. PATIENT-LVL II: ICD-10-PCS | Mod: PBBFAC,,, | Performed by: ADVANCED PRACTICE MIDWIFE

## 2022-03-18 PROCEDURE — 99999 PR PBB SHADOW E&M-EST. PATIENT-LVL II: CPT | Mod: PBBFAC,,, | Performed by: ADVANCED PRACTICE MIDWIFE

## 2022-03-18 RX ORDER — ONDANSETRON HYDROCHLORIDE 8 MG/1
8 TABLET, FILM COATED ORAL EVERY 12 HOURS PRN
Qty: 30 TABLET | Refills: 1 | Status: ON HOLD | OUTPATIENT
Start: 2022-03-18 | End: 2022-08-29 | Stop reason: HOSPADM

## 2022-03-18 NOTE — PROGRESS NOTES
Reason for visit: Routine Prenatal Visit (Nausea and vomiting )      HPI:   36 y.o., at 16w2d by Estimated Date of Delivery: 22    In with report of nausea/ vomiting, leg pain    - Contractions: denies  - Bleeding: denies  - Loss of fluid: denies  - Fetal movement: no  - Nausea: yes  - Vomiting: yes  - Headache: none      Reviewed:    Past medical, surgical, social, family, and obstetric history: Reviewed and updated in EMR.  Medications: Reviewed and updated in EMR.  Allergies: Patient has no known allergies.    Pregnancy dating, labs, ultrasound reports, prenatal testing, and problem list: Reviewed and updated in EMR.  Outside records: na  Independent interpretation of tests: na  Discussion with another healthcare professional: na      Vitals: /88   Wt 94 kg (207 lb 3.7 oz)   LMP 2021   BMI 40.47 kg/m²     Physical exam:  GENERAL: No acute distress  ABD: Gravid      Assessment and Plan:    Nausea and vomiting in pregnancy  -     ondansetron (ZOFRAN) 8 MG tablet; Take 1 tablet (8 mg total) by mouth every 12 (twelve) hours as needed for Nausea.  Dispense: 30 tablet; Refill: 1    Multigravida of advanced maternal age in second trimester  -     Maternal Sequential Screen Part 2; Future; Expected date: 2022    Morbid obesity with BMI of 40.0-44.9, adult    Supervision of high risk pregnancy in second trimester        Part 2 seq svreen today  Discussed n/v in pregnancy, dietary changes to alleviate. Rx zofran sent.  Discussed physiologic changes in pregnancy r/t discomforts of pregnancy   labor precautions given  Follow-up: 4 weeks      I spent a total of 20 minutes on the day of the visit. This includes face to face time and non-face to face time preparing to see the patient (eg, review of tests), Obtaining and/or reviewing separately obtained history, Documenting clinical information in the electronic or other health record, Independently interpreting results and communicating  results to the patient/family/caregiver, or Care coordination.

## 2022-03-21 ENCOUNTER — PATIENT MESSAGE (OUTPATIENT)
Dept: OBSTETRICS AND GYNECOLOGY | Facility: CLINIC | Age: 37
End: 2022-03-21
Payer: MEDICAID

## 2022-03-21 LAB
# FETUSES US: NORMAL
AFP MOM SERPL: 1.34
AFP SERPL-MCNC: 45 NG/ML
AGE AT DELIVERY: 39
B-HCG MOM SERPL: 1.05
B-HCG SERPL-ACNC: 37.8 IU/ML
COLLECT DATE BLD: NORMAL
COLLECT DATE: NORMAL
FET NASAL BONE LENGTH US.MEAS: NORMAL MM
FET NUCHAL FOLD MOM THICKNESS US.MEAS: 0.99
FET NUCHAL FOLD THICKNESS US.MEAS: 1.2 MM
FET TS 21 RISK FROM MAT AGE: NORMAL
GA (DAYS): 2 D
GA (WEEKS): 12 WK
GA METHOD: NORMAL
GEST. AGE (DAYS) 2ND SAMPLE (SS2): 2
GEST. AGE (WKS) 2ND SAMPLE (SS2): 16
IDDM PATIENT QL: NORMAL
INHIBIN A MOM SERPL: 1.24
INHIBIN A SERPL-MCNC: 154.6 PG/ML
INTEGRATED SCN PATIENT-IMP: NEGATIVE
PAPP-A MOM SERPL: 1.02
PAPP-A SERPL-MCNC: 931.6 NG/ML
SEQUENTIAL SCREEN PART 2 INTERP: NORMAL
SMOKING STATUS FTND: NO
TS 18 RISK FETUS: NORMAL
TS 21 RISK FETUS: NORMAL
U ESTRIOL MOM SERPL: 0.88
U ESTRIOL SERPL-MCNC: 0.83 NG/ML

## 2022-04-05 ENCOUNTER — PATIENT MESSAGE (OUTPATIENT)
Dept: OBSTETRICS AND GYNECOLOGY | Facility: CLINIC | Age: 37
End: 2022-04-05
Payer: MEDICAID

## 2022-04-05 ENCOUNTER — PATIENT MESSAGE (OUTPATIENT)
Dept: MATERNAL FETAL MEDICINE | Facility: CLINIC | Age: 37
End: 2022-04-05
Payer: MEDICAID

## 2022-04-06 ENCOUNTER — PROCEDURE VISIT (OUTPATIENT)
Dept: MATERNAL FETAL MEDICINE | Facility: CLINIC | Age: 37
End: 2022-04-06
Payer: MEDICAID

## 2022-04-06 DIAGNOSIS — Z36.89 ENCOUNTER FOR ULTRASOUND TO ASSESS FETAL GROWTH: Primary | ICD-10-CM

## 2022-04-06 DIAGNOSIS — Z36.2 ENCOUNTER FOR FOLLOW-UP ULTRASOUND OF FETAL ANATOMY: ICD-10-CM

## 2022-04-06 PROCEDURE — 76811 US MFM PROCEDURE (VIEWPOINT): ICD-10-PCS | Mod: 26,S$PBB,, | Performed by: OBSTETRICS & GYNECOLOGY

## 2022-04-06 PROCEDURE — 76811 OB US DETAILED SNGL FETUS: CPT | Mod: PBBFAC | Performed by: OBSTETRICS & GYNECOLOGY

## 2022-04-13 ENCOUNTER — ROUTINE PRENATAL (OUTPATIENT)
Dept: OBSTETRICS AND GYNECOLOGY | Facility: CLINIC | Age: 37
End: 2022-04-13
Payer: MEDICAID

## 2022-04-13 VITALS
DIASTOLIC BLOOD PRESSURE: 68 MMHG | BODY MASS INDEX: 40.99 KG/M2 | SYSTOLIC BLOOD PRESSURE: 116 MMHG | WEIGHT: 209.88 LBS

## 2022-04-13 DIAGNOSIS — Z34.82 ENCOUNTER FOR SUPERVISION OF OTHER NORMAL PREGNANCY IN SECOND TRIMESTER: ICD-10-CM

## 2022-04-13 DIAGNOSIS — Z3A.24 24 WEEKS GESTATION OF PREGNANCY: Primary | ICD-10-CM

## 2022-04-13 PROCEDURE — 99999 PR PBB SHADOW E&M-EST. PATIENT-LVL II: CPT | Mod: PBBFAC,,, | Performed by: ADVANCED PRACTICE MIDWIFE

## 2022-04-13 PROCEDURE — 99212 OFFICE O/P EST SF 10 MIN: CPT | Mod: PBBFAC,TH,PN | Performed by: ADVANCED PRACTICE MIDWIFE

## 2022-04-13 PROCEDURE — 99212 OFFICE O/P EST SF 10 MIN: CPT | Mod: TH,S$PBB,, | Performed by: ADVANCED PRACTICE MIDWIFE

## 2022-04-13 PROCEDURE — 99999 PR PBB SHADOW E&M-EST. PATIENT-LVL II: ICD-10-PCS | Mod: PBBFAC,,, | Performed by: ADVANCED PRACTICE MIDWIFE

## 2022-04-13 PROCEDURE — 99212 PR OFFICE/OUTPT VISIT, EST, LEVL II, 10-19 MIN: ICD-10-PCS | Mod: TH,S$PBB,, | Performed by: ADVANCED PRACTICE MIDWIFE

## 2022-04-14 ENCOUNTER — HOSPITAL ENCOUNTER (EMERGENCY)
Facility: OTHER | Age: 37
Discharge: HOME | End: 2022-04-14
Attending: OBSTETRICS & GYNECOLOGY
Payer: MEDICAID

## 2022-04-14 VITALS — TEMPERATURE: 98 F

## 2022-04-14 DIAGNOSIS — L02.91 ABSCESS: Primary | ICD-10-CM

## 2022-04-14 PROCEDURE — 99281 EMR DPT VST MAYX REQ PHY/QHP: CPT

## 2022-04-14 PROCEDURE — 99283 PR EMERGENCY DEPT VISIT,LEVEL III: ICD-10-PCS | Mod: ,,, | Performed by: OBSTETRICS & GYNECOLOGY

## 2022-04-14 PROCEDURE — 99283 EMERGENCY DEPT VISIT LOW MDM: CPT | Mod: ,,, | Performed by: OBSTETRICS & GYNECOLOGY

## 2022-04-15 NOTE — DISCHARGE INSTRUCTIONS
Call clinic [phone] or L & D after hours at [phone] for vaginal bleeding, leakage of fluids, regular contractions every 5 mins for 2 hours, decreased fetal movements ( 10 kicks in 2 hours), headache not relieved by Tylenol, blurry vision, or temp of 100.4 or greater.  Begin doing fetal kick counts, at least 10 movements in 2 hours starting at 28 weeks gestation.  Keep next clinic appointment.

## 2022-04-15 NOTE — ED PROVIDER NOTES
Encounter Date: 2022       History     Chief Complaint   Patient presents with    Abscess     HPI   Dipti Villafana is a 36 y.o.  at 20w1d presents complaining of nipple abscess.     Patient has had a skin abscess at L nipple x1 week today started leaking blood. No fevers nausea vomiting or breast tenderness. She has a history of bilateral nipple piercings, recently removed the L breast piercing 1 month ago.     This IUP is complicated by low lying placenta, AMA, obesity.  Patient denies contractions, denies vaginal bleeding, denies LOF.   Fetal Movement: normal.     Review of patient's allergies indicates:  No Known Allergies  Past Medical History:   Diagnosis Date    Anemia      No past surgical history on file.  Family History   Problem Relation Age of Onset    Diabetes Father     Hypertension Mother     Diabetes Mother     Heart failure Sister     Breast cancer Neg Hx     Colon cancer Neg Hx     Ovarian cancer Neg Hx      Social History     Tobacco Use    Smoking status: Never Smoker    Smokeless tobacco: Never Used   Substance Use Topics    Alcohol use: Not Currently     Comment: occasional    Drug use: No     Review of Systems   Constitutional: Negative for chills, fever and unexpected weight change.   Respiratory: Negative for cough and shortness of breath.    Cardiovascular: Negative for chest pain and palpitations.   Gastrointestinal: Negative for abdominal distention, abdominal pain, constipation and diarrhea.   Endocrine: Negative for cold intolerance and heat intolerance.   Genitourinary: Negative for difficulty urinating, dyspareunia, dysuria, genital sores, pelvic pain, urgency and vaginal pain.   Skin:        L nipple abscess    Neurological: Negative for dizziness, syncope and weakness.   Hematological: Does not bruise/bleed easily.       Physical Exam     Initial Vitals [22 2252]   BP Pulse Resp Temp SpO2   -- -- -- 98.1 °F (36.7 °C) --      MAP       --          Physical Exam    Constitutional: She appears well-developed and well-nourished. No distress.   HENT:   Head: Normocephalic and atraumatic.   Neck:   Normal range of motion.  Cardiovascular: Normal rate and intact distal pulses.   Pulmonary/Chest: Breath sounds normal. No respiratory distress. She has no wheezes.   Abdominal: Abdomen is soft. She exhibits no distension. There is no abdominal tenderness. There is no rebound.   Musculoskeletal:         General: No edema. Normal range of motion.      Cervical back: Normal range of motion.     Neurological: She is alert and oriented to person, place, and time.   Skin: Skin is warm and dry. No rash noted. No erythema.   L nipple abscess, open and draining bloos, no pus expressed. No erythema or induration. Approx 0.5c, in size. No nipple piercing present on L.piercing present on R nipple.    Psychiatric: She has a normal mood and affect. Her behavior is normal. Judgment and thought content normal.         ED Course   Procedures  Labs Reviewed - No data to display       Imaging Results    None          Medications - No data to display  Medical Decision Making:   ED Management:  VSS   Afebrile   FHT confirmed   Exam showed 0.5cm deep dermal abscess no edema/erythema/induration     Patient does not show signs of infection. Discharged with supportive care recommendations, warm compresses every 20 minutes to allow for full drainage. Infection return precautions given. Second trimester return precautions given.     Madelyn Fan MD  PGY 1  Obstetrics and Gynecology            Attending Attestation:   Physician Attestation Statement for Resident:  As the supervising MD   Physician Attestation Statement: I have personally seen and examined this patient.   I agree with the above history. -:   As the supervising MD I agree with the above PE.    As the supervising MD I agree with the above treatment, course, plan, and disposition.   -: PATIENT SEEN AND EXAMINED,   SOME BLOOD-TINGED  DISCHARGE AT SITE OF PRIOR NIPPLE PIERCING, LIKELY SMALL SUPERFICIAL DERMAL ABSCESS.  NO ERYTHEMA, INDURATION INDICATING SURROUNDING CELLULITIS.  DISCUSSED WITH PT AND PARTNER LOCAL CARE TO ENCOURAGE CONTINUED DRAINAGE.  PT ENCOURAGED TO F/U AS SCHEDULED 5/11 APPOINTMENT AND INDICATIONS TO RETURN FOR REEVALUATION REVIEWED.                          Clinical Impression:   Final diagnoses:  [L02.91] Abscess (Primary)             ED Disposition Condition    Discharge Stable        ED Prescriptions     None        Follow-up Information    None          Marsha Fan MD  Resident  04/16/22 1510       Ana Grey MD  04/17/22 1220

## 2022-04-21 NOTE — PROGRESS NOTES
Reason for visit: Routine Prenatal Visit      HPI:   36 y.o., at 21w1d by Estimated Date of Delivery: 22    In with no c/o    - Contractions: denies  - Bleeding: denies  - Loss of fluid: denies  - Fetal movement: reports FM  - Nausea: none  - Vomiting: none  - Headache: none      Reviewed:    Past medical, surgical, social, family, and obstetric history: Reviewed and updated in EMR.  Medications: Reviewed and updated in EMR.  Allergies: Patient has no known allergies.    Pregnancy dating, labs, ultrasound reports, prenatal testing, and problem list: Reviewed and updated in EMR.  Outside records: na  Independent interpretation of tests: na  Discussion with another healthcare professional: na      Vitals: /68   Wt 95.2 kg (209 lb 14.1 oz)   LMP 2021   BMI 40.99 kg/m²     Physical exam:  GENERAL: No acute distress  ABD: Gravid      Assessment and Plan:    24 weeks gestation of pregnancy  -     CBC Auto Differential; Future; Expected date: 2022  -     OB Glucose Screen; Future; Expected date: 2022    Encounter for supervision of other normal pregnancy in second trimester             labor precautions given  Follow-up:4 weeks      I spent a total of 20 minutes on the day of the visit. This includes face to face time and non-face to face time preparing to see the patient (eg, review of tests), Obtaining and/or reviewing separately obtained history, Documenting clinical information in the electronic or other health record, Independently interpreting results and communicating results to the patient/family/caregiver, or Care coordination.

## 2022-05-10 ENCOUNTER — PATIENT MESSAGE (OUTPATIENT)
Dept: MATERNAL FETAL MEDICINE | Facility: CLINIC | Age: 37
End: 2022-05-10
Payer: MEDICAID

## 2022-05-11 ENCOUNTER — PROCEDURE VISIT (OUTPATIENT)
Dept: MATERNAL FETAL MEDICINE | Facility: CLINIC | Age: 37
End: 2022-05-11
Payer: MEDICAID

## 2022-05-11 DIAGNOSIS — Z36.89 ENCOUNTER FOR ULTRASOUND TO ASSESS FETAL GROWTH: ICD-10-CM

## 2022-05-11 PROCEDURE — 76816 US MFM PROCEDURE (VIEWPOINT): ICD-10-PCS | Mod: 26,S$PBB,, | Performed by: OBSTETRICS & GYNECOLOGY

## 2022-05-11 PROCEDURE — 76816 OB US FOLLOW-UP PER FETUS: CPT | Mod: PBBFAC | Performed by: OBSTETRICS & GYNECOLOGY

## 2022-05-13 ENCOUNTER — LAB VISIT (OUTPATIENT)
Dept: LAB | Facility: OTHER | Age: 37
End: 2022-05-13
Attending: ADVANCED PRACTICE MIDWIFE
Payer: MEDICAID

## 2022-05-13 ENCOUNTER — ROUTINE PRENATAL (OUTPATIENT)
Dept: OBSTETRICS AND GYNECOLOGY | Facility: CLINIC | Age: 37
End: 2022-05-13
Payer: MEDICAID

## 2022-05-13 VITALS
BODY MASS INDEX: 41.85 KG/M2 | SYSTOLIC BLOOD PRESSURE: 120 MMHG | DIASTOLIC BLOOD PRESSURE: 70 MMHG | WEIGHT: 214.31 LBS

## 2022-05-13 DIAGNOSIS — Z34.80 SUPERVISION OF OTHER NORMAL PREGNANCY: Primary | ICD-10-CM

## 2022-05-13 DIAGNOSIS — Z3A.24 24 WEEKS GESTATION OF PREGNANCY: ICD-10-CM

## 2022-05-13 LAB
BASOPHILS # BLD AUTO: 0.01 K/UL (ref 0–0.2)
BASOPHILS NFR BLD: 0.1 % (ref 0–1.9)
DIFFERENTIAL METHOD: ABNORMAL
EOSINOPHIL # BLD AUTO: 0.1 K/UL (ref 0–0.5)
EOSINOPHIL NFR BLD: 1.6 % (ref 0–8)
ERYTHROCYTE [DISTWIDTH] IN BLOOD BY AUTOMATED COUNT: 14.4 % (ref 11.5–14.5)
GLUCOSE SERPL-MCNC: 142 MG/DL (ref 70–140)
HCT VFR BLD AUTO: 35.2 % (ref 37–48.5)
HGB BLD-MCNC: 11.1 G/DL (ref 12–16)
IMM GRANULOCYTES # BLD AUTO: 0.08 K/UL (ref 0–0.04)
IMM GRANULOCYTES NFR BLD AUTO: 0.9 % (ref 0–0.5)
LYMPHOCYTES # BLD AUTO: 1.6 K/UL (ref 1–4.8)
LYMPHOCYTES NFR BLD: 17.8 % (ref 18–48)
MCH RBC QN AUTO: 25.9 PG (ref 27–31)
MCHC RBC AUTO-ENTMCNC: 31.5 G/DL (ref 32–36)
MCV RBC AUTO: 82 FL (ref 82–98)
MONOCYTES # BLD AUTO: 0.7 K/UL (ref 0.3–1)
MONOCYTES NFR BLD: 8.4 % (ref 4–15)
NEUTROPHILS # BLD AUTO: 6.2 K/UL (ref 1.8–7.7)
NEUTROPHILS NFR BLD: 71.2 % (ref 38–73)
NRBC BLD-RTO: 0 /100 WBC
PLATELET # BLD AUTO: 298 K/UL (ref 150–450)
PMV BLD AUTO: 9.3 FL (ref 9.2–12.9)
RBC # BLD AUTO: 4.28 M/UL (ref 4–5.4)
WBC # BLD AUTO: 8.7 K/UL (ref 3.9–12.7)

## 2022-05-13 PROCEDURE — 99213 PR OFFICE/OUTPT VISIT, EST, LEVL III, 20-29 MIN: ICD-10-PCS | Mod: TH,S$PBB,, | Performed by: ADVANCED PRACTICE MIDWIFE

## 2022-05-13 PROCEDURE — 82950 GLUCOSE TEST: CPT | Performed by: ADVANCED PRACTICE MIDWIFE

## 2022-05-13 PROCEDURE — 99999 PR PBB SHADOW E&M-EST. PATIENT-LVL II: CPT | Mod: PBBFAC,,, | Performed by: ADVANCED PRACTICE MIDWIFE

## 2022-05-13 PROCEDURE — 36415 COLL VENOUS BLD VENIPUNCTURE: CPT | Performed by: ADVANCED PRACTICE MIDWIFE

## 2022-05-13 PROCEDURE — 99212 OFFICE O/P EST SF 10 MIN: CPT | Mod: PBBFAC,TH,PN | Performed by: ADVANCED PRACTICE MIDWIFE

## 2022-05-13 PROCEDURE — 99999 PR PBB SHADOW E&M-EST. PATIENT-LVL II: ICD-10-PCS | Mod: PBBFAC,,, | Performed by: ADVANCED PRACTICE MIDWIFE

## 2022-05-13 PROCEDURE — 99213 OFFICE O/P EST LOW 20 MIN: CPT | Mod: TH,S$PBB,, | Performed by: ADVANCED PRACTICE MIDWIFE

## 2022-05-13 PROCEDURE — 85025 COMPLETE CBC W/AUTO DIFF WBC: CPT | Performed by: ADVANCED PRACTICE MIDWIFE

## 2022-05-13 NOTE — PROGRESS NOTES
Reason for visit: Routine Prenatal Visit      HPI:   36 y.o., at 24w2d by Estimated Date of Delivery: 22    In with no c/o    - Contractions: denies  - Bleeding: denies  - Loss of fluid: denies  - Fetal movement: reports FM  - Nausea: denies  - Vomiting: denies  - Headache: denies      Reviewed:    Past medical, surgical, social, family, and obstetric history: Reviewed and updated in EMR.  Medications: Reviewed and updated in EMR.  Allergies: Patient has no known allergies.    Pregnancy dating, labs, ultrasound reports, prenatal testing, and problem list: Reviewed and updated in EMR.  Outside records: na  Independent interpretation of tests: na  Discussion with another healthcare professional: na      Vitals: /70   Wt 97.2 kg (214 lb 4.6 oz)   LMP 2021   BMI 41.85 kg/m²     Physical exam:  GENERAL: No acute distress  ABD: Gravid      Assessment and Plan:    Supervision of other normal pregnancy         DM screen done today     labor precautions given  Follow-up: 2 weeks      I spent a total of 20 minutes on the day of the visit. This includes face to face time and non-face to face time preparing to see the patient (eg, review of tests), Obtaining and/or reviewing separately obtained history, Documenting clinical information in the electronic or other health record, Independently interpreting results and communicating results to the patient/family/caregiver, or Care coordination.

## 2022-05-17 ENCOUNTER — PATIENT MESSAGE (OUTPATIENT)
Dept: OBSTETRICS AND GYNECOLOGY | Facility: CLINIC | Age: 37
End: 2022-05-17
Payer: MEDICAID

## 2022-05-18 ENCOUNTER — PATIENT MESSAGE (OUTPATIENT)
Dept: ADMINISTRATIVE | Facility: OTHER | Age: 37
End: 2022-05-18
Payer: MEDICAID

## 2022-05-18 ENCOUNTER — TELEPHONE (OUTPATIENT)
Dept: OBSTETRICS AND GYNECOLOGY | Facility: CLINIC | Age: 37
End: 2022-05-18
Payer: MEDICAID

## 2022-05-18 DIAGNOSIS — Z34.82 SUPERVISION OF NORMAL INTRAUTERINE PREGNANCY IN MULTIGRAVIDA IN SECOND TRIMESTER: Primary | ICD-10-CM

## 2022-05-25 ENCOUNTER — PROCEDURE VISIT (OUTPATIENT)
Dept: OBSTETRICS AND GYNECOLOGY | Facility: CLINIC | Age: 37
End: 2022-05-25
Payer: MEDICAID

## 2022-05-25 DIAGNOSIS — Z34.82 SUPERVISION OF NORMAL INTRAUTERINE PREGNANCY IN MULTIGRAVIDA IN SECOND TRIMESTER: ICD-10-CM

## 2022-05-25 LAB
GLUCOSE SERPL-MCNC: 127 MG/DL
GLUCOSE SERPL-MCNC: 164 MG/DL
GLUCOSE SERPL-MCNC: 179 MG/DL
GLUCOSE SERPL-MCNC: 89 MG/DL (ref 70–110)

## 2022-05-25 PROCEDURE — 82951 GLUCOSE TOLERANCE TEST (GTT): CPT | Performed by: ADVANCED PRACTICE MIDWIFE

## 2022-05-25 NOTE — PROGRESS NOTES
Lab Documentation:    Order Type: Written Order placed in [facility]    Patient in for lab visit only per provider treatment plan.

## 2022-06-01 ENCOUNTER — TELEPHONE (OUTPATIENT)
Dept: OBSTETRICS AND GYNECOLOGY | Facility: CLINIC | Age: 37
End: 2022-06-01
Payer: MEDICAID

## 2022-06-01 RX ORDER — LANCETS 33 GAUGE
1 EACH MISCELLANEOUS 3 TIMES DAILY
Qty: 100 EACH | Refills: 3 | Status: ON HOLD | OUTPATIENT
Start: 2022-06-01 | End: 2022-08-20 | Stop reason: HOSPADM

## 2022-06-01 RX ORDER — DEXTROSE 4 G
TABLET,CHEWABLE ORAL
Qty: 1 EACH | Refills: 0 | Status: SHIPPED | OUTPATIENT
Start: 2022-06-01 | End: 2022-08-20

## 2022-06-01 NOTE — TELEPHONE ENCOUNTER
Spoke to pt per phone- discussed glucose levels of 3hr GTT. Discussed keeping a log of glucose values for review as only 1 abnormal but one close to abnormal. Instructions given and pt to  glucometer kit from [facility] pharmacy. Discussed FBS and 2 postprandial daily

## 2022-06-03 ENCOUNTER — TELEPHONE (OUTPATIENT)
Dept: OBSTETRICS AND GYNECOLOGY | Facility: CLINIC | Age: 37
End: 2022-06-03
Payer: MEDICAID

## 2022-06-03 NOTE — TELEPHONE ENCOUNTER
"Rescheduled with Dr. Stone patient advised.  ----- Message from [facility] sent at 6/3/2022 12:58 PM CDT -----  Regarding: Returning Call              Name of Who is Calling: Dipti Villafana    Who Left The Message: Dipti Villafana    Message Is For Ms. Kirkland      What is the request in detail:  Patient called stating, "she's returning the Office's call and would like you to please call again."  Please further advise. Thank you!         Reply by [facility]: Yes    Preferred Call Back  :  [phone] (S)                                            "

## 2022-06-08 ENCOUNTER — PATIENT MESSAGE (OUTPATIENT)
Dept: ADMINISTRATIVE | Facility: OTHER | Age: 37
End: 2022-06-08
Payer: MEDICAID

## 2022-06-17 ENCOUNTER — ROUTINE PRENATAL (OUTPATIENT)
Dept: OBSTETRICS AND GYNECOLOGY | Facility: CLINIC | Age: 37
End: 2022-06-17
Payer: MEDICAID

## 2022-06-17 VITALS
WEIGHT: 216.06 LBS | BODY MASS INDEX: 42.19 KG/M2 | SYSTOLIC BLOOD PRESSURE: 112 MMHG | DIASTOLIC BLOOD PRESSURE: 64 MMHG

## 2022-06-17 DIAGNOSIS — O09.529 AMA (ADVANCED MATERNAL AGE) MULTIGRAVIDA 35+, UNSPECIFIED TRIMESTER: ICD-10-CM

## 2022-06-17 DIAGNOSIS — Z23 NEED FOR TDAP VACCINATION: ICD-10-CM

## 2022-06-17 DIAGNOSIS — O24.419 GESTATIONAL DIABETES MELLITUS (GDM) AFFECTING PREGNANCY: ICD-10-CM

## 2022-06-17 DIAGNOSIS — O24.419 GESTATIONAL DIABETES MELLITUS (GDM) IN THIRD TRIMESTER, GESTATIONAL DIABETES METHOD OF CONTROL UNSPECIFIED: Primary | ICD-10-CM

## 2022-06-17 DIAGNOSIS — O99.210 OBESITY AFFECTING PREGNANCY, ANTEPARTUM: ICD-10-CM

## 2022-06-17 PROCEDURE — 99999 PR PBB SHADOW E&M-EST. PATIENT-LVL III: CPT | Mod: PBBFAC,,, | Performed by: OBSTETRICS & GYNECOLOGY

## 2022-06-17 PROCEDURE — 99999 PR PBB SHADOW E&M-EST. PATIENT-LVL III: ICD-10-PCS | Mod: PBBFAC,,, | Performed by: OBSTETRICS & GYNECOLOGY

## 2022-06-17 PROCEDURE — 99214 OFFICE O/P EST MOD 30 MIN: CPT | Mod: TH,S$PBB,, | Performed by: OBSTETRICS & GYNECOLOGY

## 2022-06-17 PROCEDURE — 99214 PR OFFICE/OUTPT VISIT, EST, LEVL IV, 30-39 MIN: ICD-10-PCS | Mod: TH,S$PBB,, | Performed by: OBSTETRICS & GYNECOLOGY

## 2022-06-17 PROCEDURE — 90715 TDAP VACCINE 7 YRS/> IM: CPT | Mod: PBBFAC,PN

## 2022-06-17 PROCEDURE — 99213 OFFICE O/P EST LOW 20 MIN: CPT | Mod: PBBFAC,TH,PN | Performed by: OBSTETRICS & GYNECOLOGY

## 2022-06-17 NOTE — PROGRESS NOTES
Pregnancy dating, labs, ultrasound reports, prenatal testing, and problem list; prior records and results; and available outside records were reviewed and updated in EMR.  Pertinent findings were noted below.    Reason for Visit   Routine Prenatal Visit    HPI   37 y.o., at 29w2d by Estimated Date of Delivery: 22    Patient did glucose log after 1 abnormal value with another value one point from abnormal on 3 hour as recommended by [facility] [place] - scanned into media.     Contractions: No   Bleeding: No   Loss of fluid: No   Fetal movement: Yes   Nausea: No   Vomiting: No   Headache: No     Exam   /64   Wt 98 kg (216 lb 0.8 oz)   LMP 2021   BMI 42.19 kg/m²     GENERAL: No acute distress  ABD: Gravid    Assessment and Plan   Gestational diabetes mellitus (GDM) in third trimester, gestational diabetes method of control unspecified  -     Ambulatory referral/consult to Perinatology; Future; Expected date: 2022  -     Ambulatory referral/consult to Diabetes Education; Future; Expected date: 2022    Need for Tdap vaccination  -     (In Office Administered) Tdap Vaccine    Gestational diabetes mellitus (GDM) affecting pregnancy    AMA (advanced maternal age) multigravida 35+, unspecified trimester    Obesity affecting pregnancy, antepartum         TDAP vaccine given in-office today   Glucose log reviewed >>50% values above threshold - diagnosed with GDM. Discussed diet, exercise. DM education referral placed. Meds likely required based off of log, referral to MFM placed.   Medicaid BTL papers signed today in office   FMLA form provided   Growth US scheduled    If patient requires medications for GDM - will need PNT @ 32wk     labor and preE precautions given  Follow-up: 2 weeks    Total time of 40 minutes, including face-to-face time and non-face-to-face time preparing to see the patient (eg, review of tests), obtaining and/or reviewing separately obtained history, documenting  clinical information in the electronic or other health record, independently interpreting results, communicating results to the patient/family/caregiver, or care coordination

## 2022-06-22 ENCOUNTER — ROUTINE PRENATAL (OUTPATIENT)
Dept: OBSTETRICS AND GYNECOLOGY | Facility: CLINIC | Age: 37
End: 2022-06-22
Payer: MEDICAID

## 2022-06-22 VITALS
BODY MASS INDEX: 42.45 KG/M2 | SYSTOLIC BLOOD PRESSURE: 114 MMHG | WEIGHT: 217.38 LBS | DIASTOLIC BLOOD PRESSURE: 70 MMHG

## 2022-06-22 DIAGNOSIS — O09.893 SUPERVISION OF OTHER HIGH RISK PREGNANCIES, THIRD TRIMESTER: Primary | ICD-10-CM

## 2022-06-22 PROCEDURE — 99999 PR PBB SHADOW E&M-EST. PATIENT-LVL II: ICD-10-PCS | Mod: PBBFAC,,, | Performed by: ADVANCED PRACTICE MIDWIFE

## 2022-06-22 PROCEDURE — 99213 OFFICE O/P EST LOW 20 MIN: CPT | Mod: TH,S$PBB,, | Performed by: ADVANCED PRACTICE MIDWIFE

## 2022-06-22 PROCEDURE — 99213 PR OFFICE/OUTPT VISIT, EST, LEVL III, 20-29 MIN: ICD-10-PCS | Mod: TH,S$PBB,, | Performed by: ADVANCED PRACTICE MIDWIFE

## 2022-06-22 PROCEDURE — 99212 OFFICE O/P EST SF 10 MIN: CPT | Mod: PBBFAC,TH,PN | Performed by: ADVANCED PRACTICE MIDWIFE

## 2022-06-22 PROCEDURE — 99999 PR PBB SHADOW E&M-EST. PATIENT-LVL II: CPT | Mod: PBBFAC,,, | Performed by: ADVANCED PRACTICE MIDWIFE

## 2022-06-28 ENCOUNTER — CLINICAL SUPPORT (OUTPATIENT)
Dept: DIABETES | Facility: CLINIC | Age: 37
End: 2022-06-28
Payer: MEDICAID

## 2022-06-28 VITALS — WEIGHT: 218.25 LBS | BODY MASS INDEX: 42.63 KG/M2

## 2022-06-28 DIAGNOSIS — O24.419 GESTATIONAL DIABETES MELLITUS (GDM) IN THIRD TRIMESTER, GESTATIONAL DIABETES METHOD OF CONTROL UNSPECIFIED: ICD-10-CM

## 2022-06-28 PROCEDURE — G0108 DIAB MANAGE TRN  PER INDIV: HCPCS | Mod: PBBFAC | Performed by: DIETITIAN, REGISTERED

## 2022-06-28 PROCEDURE — 99999 PR PBB SHADOW E&M-EST. PATIENT-LVL I: CPT | Mod: PBBFAC,,, | Performed by: DIETITIAN, REGISTERED

## 2022-06-28 PROCEDURE — 99211 OFF/OP EST MAY X REQ PHY/QHP: CPT | Mod: PBBFAC | Performed by: DIETITIAN, REGISTERED

## 2022-06-28 PROCEDURE — 99999 PR PBB SHADOW E&M-EST. PATIENT-LVL I: ICD-10-PCS | Mod: PBBFAC,,, | Performed by: DIETITIAN, REGISTERED

## 2022-06-28 NOTE — PROGRESS NOTES
"Diabetes Care Specialist Progress Note  Author: Barb Diaz RD  Date: 6/28/2022    Program Intake  Reason for Diabetes Program Visit:: Initial Diabetes Assessment  Current diabetes risk level:: low  In the last 12 months, have you:: used emergency room services  Was the ER or hospital admission related to diabetes?: No    No results found for: LABA1C, HGBA1C    Clinical    Patient Health Rating  Compared to other people your age, how would you rate your health?: Good    Problem Review  Reviewed Problem List with Patient: no (see comments)  Active comorbidities affecting diabetes self-care.: no  Reviewed health maintenance: no (see comments)    Clinical Assessment  Current Diabetes Treatment: Diet  Have you ever experienced hypoglycemia (low blood sugar)?: no  Have you ever experienced hyperglycemia (high blood sugar)?: no    Medication Information  How do you obtain your medications?: Patient drives  How many days a week do you miss your medications?: Never  Medication adherence impacting ability to self-manage diabetes?: No    Labs  Do you have regular lab work to monitor your medications?: Yes  Type of Regular Lab Work: CBC, BMP  Where do you get your labs drawn?: [facility]  Lab Compliance Barriers: No    Nutritional Status  Diet: Regular  Meal Plan 24 Hour Recall: Breakfast, Lunch, Dinner, Snack (beverages: water, gatoraid, juice,)  Meal Plan 24 Hour Recall - Breakfast: rosales egg and cheese crossaint with water  Meal Plan 24 Hour Recall - Lunch: salad with grilled chicken with gatoraide  Meal Plan 24 Hour Recall - Dinner: fried chicken, rice and green beans with "fruit water"  Change in appetite?: No  Dentation:: Intact  Recent Changes in Weight: No Recent Weight Change  Current nutritional status an area of need that is impacting patient's ability to self-manage diabetes?: No    Additional Social History    Support  Does anyone support you with your diabetes care?: yes  Who supports you?: family " member  Who takes you to your medical appointments?: self  Does the current support meet the patient's needs?: Yes  Is Support an area impacting ability to self-manage diabetes?: No    Access to Mass Media & Technology  Does the patient have access to any of the following devices or technologies?: Internet Access, Smart phone  Media or technology needs impacting ability to self-manage diabetes?: No    Cognitive/Behavioral Health  Alert and Oriented: Yes  Difficulty Thinking: No  Requires Prompting: No  Requires assistance for routine expression?: No  Cognitive or behavioral barriers impacting ability to self-manage diabetes?: No    Culture/Rastafarian  Culture or Confucianism beliefs that may impact ability to access healthcare: No    Communication  Language preference: English  Hearing Problems: No  Vision Problems: Yes  Vision Assistance: Glasses  Communication needs impacting ability to self-manage diabetes?: No    Health Literacy  Preferred Learning Method: Face to Face  How often do you need to have someone help you read instructions, pamphlets, or written material from your doctor or pharmacy?: Never  Health literacy needs impacting ability to self-manage diabetes?: No      Diabetes Self-Management Skills Assessment    Diabetes Disease Process/Treatment Options  Patient/caregiver able to state what happens when someone has diabetes.: no  Patient/caregiver knows what type of diabetes they have.: yes  Diabetes Type : Gestational  Patient/caregiver able to identify at least three signs and symptoms of diabetes.: no  Patient able to identify at least three risk factors for diabetes.: no  Diabetes Disease Process/Treatment Options: Skills Assessment Completed: Yes  Assessment indicates:: Knowledge deficit, Instruction Needed  Area of need?: Yes    Nutrition/Healthy Eating  Challenges to healthy eating:: other (see comments) (cost)  Method of carbohydrate measurement:: no method  Patient can identify foods that impact  blood sugar.: yes  Patient-identified foods:: starchy vegetables (corn, peas, beans), starches (bread, pasta, rice, cereal)  Nutrition/Healthy Eating Skills Assessment Completed:: Yes  Assessment indicates:: Knowledge deficit, Instruction Needed  Area of need?: Yes    Physical Activity/Exercise  Patient's daily activity level:: moderately active  Patient formally exercises outside of work.: yes  Exercise Type: walking  Intensity: Low  Frequency: three times a week  Duration: 15 min  Patient can identify forms of physical activity.: yes  Stated forms of physical activity:: moving to burn calories  Patient can identify reasons why exercise/physical activity is important in diabetes management.: no  Physical Activity/Exercise Skills Assessment Completed: : Yes  Assessment indicates:: Knowledge deficit, Instruction Needed  Area of need?: Yes    Medications  Area of need?: No    Home Blood Glucose Monitoring  Patient states that blood sugar is checked at home daily.: yes  Monitoring Method:: home glucometer  How often do you check your blood sugar?: 4 times a day  When do you check your blood sugar?: Before breakfast, 2 hours after meal  When you check what is your typical blood sugar range? : fastin this am, PP meals:[id number] recall  Blood glucose logs:: no, encouraged to keep logs, encouraged to bring logs to provider visits  Blood glucose logs reviewed today?: no  Home Blood Glucose Monitoring Skills Assessment Completed: : Yes  Assessment indicates:: Knowledge deficit, Instruction Needed  Area of need?: Yes    Acute Complications  Patient is able to identify types of acute complications: No  Acute Complications Skills Assessment Completed: : Yes  Assessment indicates:: Knowledge deficit, Instruction Needed  Area of need?: Yes    Chronic Complications  Patient can identify major chronic complications of diabetes.: no  Patient can identify ways to prevent or delay diabetes complications.: yes  Stated ways to  prevent complications:: controlling blood sugar  Patient is aware that having diabetes increases risk of heart disease?: No  Patient is aware that heart disease is the leading cause of death and disability in people with diabetes?: No  Patient able to state risk factors for heart disease?: No  Patient is taking statin?: No  Has your doctor talked to you about Statin use?: No  Do you want more information on Statins?: No  Chronic Complications Skills Assessment Completed: : Yes  Assessment indicates:: Knowledge deficit, Instruction Needed  Area of need?: Yes    Psychosocial/Coping  Patient can identify ways of coping with chronic disease.: yes  Psychosocial/Coping Skills Assessment Completed: : Yes  Assessment indicates:: Adequate understanding (states is not stressed)  Area of need?: No      Diabetes Self Support Plan         Assessment Summary and Plan    Based on today's diabetes care assessment, the following areas of need were identified:      Social 6/28/2022   Support No   Access to [employer]/Tech No   Cognitive/Behavioral Health No   Culture/Orthodox No   Communication No   Health Literacy No        Clinical 6/28/2022   Medication Adherence No   Lab Compliance No   Nutritional Status No        Diabetes Self-Management Skills 6/28/2022   Diabetes Disease Process/Treatment Options Yes-ed on risk factors and treatment options for GDM   Nutrition/Healthy Eating Yes-see care plan   Physical Activity/Exercise Yes-ed on benefits of 20-30 mins of exercise 4-5 times per week   Medication No-reviewed medication options   Home Blood Glucose Monitoring Yes-see care plan   Acute Complications Yes-ed on causes, s/s and TX for hypo and hyperglycemia   Chronic Complications Yes--ed on tight BG control to limit/prevent complications during and after pregnancy   Psychosocial/Coping No          Today's interventions were provided through individual discussion, instruction, and written materials were provided.      Patient  verbalized understanding of instruction and written materials.  Pt was able to return back demonstration of instructions today. Patient understood key points, needs reinforcement and further instruction.     Diabetes Self-Management Care Plan:    Today's Diabetes Self-Management Care Plan was developed with Dipti's input. Dipti has agreed to work toward the following goal(s) to improve his/her overall diabetes control.      Care Plan: Diabetes Management   Updates made since 5/29/2022 12:00 AM      Problem: Healthy Eating       Goal: Eat 30-45g of carbs at breakfast and 45-60g of carbs at lunch and dinner    Start Date: 6/28/2022   Expected End Date: 7/12/2022   Priority: High   Barriers: No Barriers Identified      Task: Reviewed the sources and role of Carbohydrate, Protein, and Fat and how each nutrient impacts blood sugar. Completed 6/28/2022      Task: Provided visual examples using dry measuring cups, food models, and other familiar objects such as computer mouse, deck or cards, tennis ball etc. to help with visualization of portions. Completed 6/28/2022      Task: Explained how to count carbohydrates using the food label and the use of dry measuring cups for accurate carb counting. Completed 6/28/2022      Task: Discussed strategies for choosing healthier menu options when dining out. Completed 6/28/2022      Task: Recommended replacing beverages containing high sugar content with noncaloric/sugar free options and/or water. Completed 6/28/2022      Task: Review the importance of balancing carbohydrates with each meal using portion control techniques to count servings of carbohydrate and label reading to identify serving size and amount of total carbs per serving. Completed 6/28/2022      Task: Provided Sample plate method and reviewed the use of the plate to estimate amounts of carbohydrate per meal. Completed 6/28/2022      Problem: Blood Glucose Self-Monitoring       Goal: Patient agrees to check and  record blood sugars 4 times per day.    Start Date: 6/28/2022   Expected End Date: 7/12/2022   Priority: Medium   Barriers: No Barriers Identified      Task: Provided patient with a meter today and sent Rx request to provider to send to patients pharmacy.       Task: Reviewed the importance of self-monitoring blood glucose and keeping logs. Completed 6/28/2022      Task: Instructed on how to self-monitor blood glucose using a home glucometer, how to properly dispose of used strips and lancets after use, and how to appropriately store meter and supplies. Completed 6/28/2022      Task: Provided patient with blood glucose logs, reviewed appropriate timing and frequency to SMBG, education on parameters on when to notify provider and advised patient to bring logs to all appts with PCP/Endocrinologist/Diabetes Care Specialist. Completed 6/28/2022      Task: Discussed ways to minimize pain when monitoring blood glucose. Completed 6/28/2022          Follow Up Plan     No follow-ups on file.    Today's care plan and follow up schedule was discussed with patient.  Dipti verbalized understanding of the care plan, goals, and agrees to follow up plan.        The patient was encouraged to communicate with his/her health care provider/physician and care team regarding his/her condition(s) and treatment.  I provided the patient with my contact information today and encouraged to contact me via phone or [facility] Patient Portal as needed.     Length of Visit   Total Time: 60 Minutes

## 2022-06-30 NOTE — PROGRESS NOTES
Reason for visit: Routine Prenatal Visit      HPI:   37 y.o., at 31w1d by Estimated Date of Delivery: 22    In with no c/o, reports reviewed glucose log with Dr. Stone and will bring it to MFM consult    - Contractions: denies  - Bleeding: denies  - Loss of fluid: denies  - Fetal movement: reports good Fm, reinforced BID  - Nausea: no  - Vomiting: no  - Headache: no      Reviewed:    Past medical, surgical, social, family, and obstetric history: Reviewed and updated in EMR.  Medications: Reviewed and updated in EMR.  Allergies: Patient has no known allergies.    Pregnancy dating, labs, ultrasound reports, prenatal testing, and problem list: Reviewed and updated in EMR.  Outside records: na  Independent interpretation of tests: na  Discussion with another healthcare professional: na      Vitals: /70   Wt 98.6 kg (217 lb 6 oz)   LMP 2021   BMI 42.45 kg/m²     Physical exam:  GENERAL: No acute distress  ABD: Gravid      Assessment and Plan:    Supervision of other high risk pregnancies, third trimester      Discussed need to bring log to all visits  Discussed US and MFM consult scheduled for 22 and importance of keeping appt sec to GDM  Discussed PNT weekly at 32 weeks     labor precautions given  Follow-up: 2 weeks      I spent a total of 20 minutes on the day of the visit. This includes face to face time and non-face to face time preparing to see the patient (eg, review of tests), Obtaining and/or reviewing separately obtained history, Documenting clinical information in the electronic or other health record, Independently interpreting results and communicating results to the patient/family/caregiver, or Care coordination.

## 2022-07-05 ENCOUNTER — PATIENT MESSAGE (OUTPATIENT)
Dept: MATERNAL FETAL MEDICINE | Facility: CLINIC | Age: 37
End: 2022-07-05
Payer: MEDICAID

## 2022-07-06 ENCOUNTER — PROCEDURE VISIT (OUTPATIENT)
Dept: MATERNAL FETAL MEDICINE | Facility: CLINIC | Age: 37
End: 2022-07-06
Payer: MEDICAID

## 2022-07-06 ENCOUNTER — OFFICE VISIT (OUTPATIENT)
Dept: MATERNAL FETAL MEDICINE | Facility: CLINIC | Age: 37
End: 2022-07-06
Payer: MEDICAID

## 2022-07-06 VITALS
WEIGHT: 216.06 LBS | BODY MASS INDEX: 42.42 KG/M2 | HEIGHT: 60 IN | SYSTOLIC BLOOD PRESSURE: 116 MMHG | DIASTOLIC BLOOD PRESSURE: 80 MMHG

## 2022-07-06 DIAGNOSIS — Z36.89 ENCOUNTER FOR ULTRASOUND TO ASSESS FETAL GROWTH: Primary | ICD-10-CM

## 2022-07-06 DIAGNOSIS — O24.419 GESTATIONAL DIABETES MELLITUS (GDM) IN THIRD TRIMESTER, GESTATIONAL DIABETES METHOD OF CONTROL UNSPECIFIED: ICD-10-CM

## 2022-07-06 DIAGNOSIS — O09.523 MULTIGRAVIDA OF ADVANCED MATERNAL AGE IN THIRD TRIMESTER: ICD-10-CM

## 2022-07-06 DIAGNOSIS — Z36.89 ENCOUNTER FOR ULTRASOUND TO ASSESS FETAL GROWTH: ICD-10-CM

## 2022-07-06 DIAGNOSIS — Z3A.32 32 WEEKS GESTATION OF PREGNANCY: Primary | ICD-10-CM

## 2022-07-06 DIAGNOSIS — O44.43 LOW LYING PLACENTA NOS OR WITHOUT HEMORRHAGE, THIRD TRIMESTER: ICD-10-CM

## 2022-07-06 PROCEDURE — 3074F SYST BP LT 130 MM HG: CPT | Mod: CPTII,,, | Performed by: OBSTETRICS & GYNECOLOGY

## 2022-07-06 PROCEDURE — 76817 TRANSVAGINAL US OBSTETRIC: CPT | Mod: 26,S$PBB,, | Performed by: OBSTETRICS & GYNECOLOGY

## 2022-07-06 PROCEDURE — 3008F PR BODY MASS INDEX (BMI) DOCUMENTED: ICD-10-PCS | Mod: CPTII,,, | Performed by: OBSTETRICS & GYNECOLOGY

## 2022-07-06 PROCEDURE — 3074F PR MOST RECENT SYSTOLIC BLOOD PRESSURE < 130 MM HG: ICD-10-PCS | Mod: CPTII,,, | Performed by: OBSTETRICS & GYNECOLOGY

## 2022-07-06 PROCEDURE — 3079F PR MOST RECENT DIASTOLIC BLOOD PRESSURE 80-89 MM HG: ICD-10-PCS | Mod: CPTII,,, | Performed by: OBSTETRICS & GYNECOLOGY

## 2022-07-06 PROCEDURE — 76816 OB US FOLLOW-UP PER FETUS: CPT | Mod: PBBFAC | Performed by: OBSTETRICS & GYNECOLOGY

## 2022-07-06 PROCEDURE — 76817 US MFM PROCEDURE (VIEWPOINT): ICD-10-PCS | Mod: 26,S$PBB,, | Performed by: OBSTETRICS & GYNECOLOGY

## 2022-07-06 PROCEDURE — 99204 PR OFFICE/OUTPT VISIT, NEW, LEVL IV, 45-59 MIN: ICD-10-PCS | Mod: S$PBB,TH,25, | Performed by: OBSTETRICS & GYNECOLOGY

## 2022-07-06 PROCEDURE — 3008F BODY MASS INDEX DOCD: CPT | Mod: CPTII,,, | Performed by: OBSTETRICS & GYNECOLOGY

## 2022-07-06 PROCEDURE — 76816 US MFM PROCEDURE (VIEWPOINT): ICD-10-PCS | Mod: 26,S$PBB,, | Performed by: OBSTETRICS & GYNECOLOGY

## 2022-07-06 PROCEDURE — 99999 PR PBB SHADOW E&M-EST. PATIENT-LVL III: CPT | Mod: PBBFAC,,, | Performed by: OBSTETRICS & GYNECOLOGY

## 2022-07-06 PROCEDURE — 3079F DIAST BP 80-89 MM HG: CPT | Mod: CPTII,,, | Performed by: OBSTETRICS & GYNECOLOGY

## 2022-07-06 PROCEDURE — 99204 OFFICE O/P NEW MOD 45 MIN: CPT | Mod: S$PBB,TH,25, | Performed by: OBSTETRICS & GYNECOLOGY

## 2022-07-06 PROCEDURE — 99999 PR PBB SHADOW E&M-EST. PATIENT-LVL III: ICD-10-PCS | Mod: PBBFAC,,, | Performed by: OBSTETRICS & GYNECOLOGY

## 2022-07-06 PROCEDURE — 99213 OFFICE O/P EST LOW 20 MIN: CPT | Mod: PBBFAC,TH | Performed by: OBSTETRICS & GYNECOLOGY

## 2022-07-08 ENCOUNTER — ROUTINE PRENATAL (OUTPATIENT)
Dept: OBSTETRICS AND GYNECOLOGY | Facility: CLINIC | Age: 37
End: 2022-07-08
Payer: MEDICAID

## 2022-07-08 VITALS
BODY MASS INDEX: 42.32 KG/M2 | WEIGHT: 216.69 LBS | SYSTOLIC BLOOD PRESSURE: 112 MMHG | DIASTOLIC BLOOD PRESSURE: 60 MMHG

## 2022-07-08 DIAGNOSIS — O44.43 LOW LYING PLACENTA NOS OR WITHOUT HEMORRHAGE, THIRD TRIMESTER: ICD-10-CM

## 2022-07-08 DIAGNOSIS — O24.410 DIET CONTROLLED GESTATIONAL DIABETES MELLITUS (GDM) IN THIRD TRIMESTER: ICD-10-CM

## 2022-07-08 DIAGNOSIS — O99.213 OBESITY AFFECTING PREGNANCY IN THIRD TRIMESTER: ICD-10-CM

## 2022-07-08 DIAGNOSIS — O09.523 MULTIGRAVIDA OF ADVANCED MATERNAL AGE IN THIRD TRIMESTER: Primary | ICD-10-CM

## 2022-07-08 PROCEDURE — 99213 OFFICE O/P EST LOW 20 MIN: CPT | Mod: PBBFAC,TH,PN | Performed by: OBSTETRICS & GYNECOLOGY

## 2022-07-08 PROCEDURE — 99214 PR OFFICE/OUTPT VISIT, EST, LEVL IV, 30-39 MIN: ICD-10-PCS | Mod: TH,S$PBB,, | Performed by: OBSTETRICS & GYNECOLOGY

## 2022-07-08 PROCEDURE — 99999 PR PBB SHADOW E&M-EST. PATIENT-LVL III: CPT | Mod: PBBFAC,,, | Performed by: OBSTETRICS & GYNECOLOGY

## 2022-07-08 PROCEDURE — 99214 OFFICE O/P EST MOD 30 MIN: CPT | Mod: TH,S$PBB,, | Performed by: OBSTETRICS & GYNECOLOGY

## 2022-07-08 PROCEDURE — 99999 PR PBB SHADOW E&M-EST. PATIENT-LVL III: ICD-10-PCS | Mod: PBBFAC,,, | Performed by: OBSTETRICS & GYNECOLOGY

## 2022-07-10 PROBLEM — O99.340 MENTAL DISORDER AFFECTING PREGNANCY, ANTEPARTUM: Status: ACTIVE | Noted: 2022-07-10

## 2022-07-10 PROBLEM — Z30.2 STERILIZATION: Status: ACTIVE | Noted: 2022-07-10

## 2022-07-10 PROBLEM — O24.410 DIET CONTROLLED GESTATIONAL DIABETES MELLITUS (GDM), ANTEPARTUM: Status: ACTIVE | Noted: 2022-06-17

## 2022-07-10 PROBLEM — O99.210 OBESITY AFFECTING PREGNANCY, ANTEPARTUM: Status: ACTIVE | Noted: 2022-07-10

## 2022-07-10 PROBLEM — O44.43 LOW LYING PLACENTA NOS OR WITHOUT HEMORRHAGE, THIRD TRIMESTER: Status: ACTIVE | Noted: 2022-07-10

## 2022-07-10 NOTE — PROGRESS NOTES
Reason for Visit: Routine Prenatal Visit    32w2d by Estimated Date of Delivery: 22    Blood pressure 112/60, weight 98.3 kg (216 lb 11.4 oz), last menstrual period 2021.    Multigravida of advanced maternal age in third trimester    Diet controlled gestational diabetes mellitus (GDM) in third trimester    Obesity affecting pregnancy in third trimester    Low lying placenta nos or without hemorrhage, third trimester       No contractions, bleeding, or loss of fluid.  +fetal movement.  Labs reviewed and up to date.   MFM reviewed patient's blood sugars.  I reviewed her blood sugar logs today, and they are still overall well controlled except for mildly elevated fastings.  Continue pattern blood sugars and diabetic diet.   Ultrasound report from earlier this week is still pending.  Per patient report,  growth was normal.   Per patient report, low lying placenta has not yet resolved.  Repeat ultrasound is scheduled.     labor precautions given  Follow-up: 2 weeks

## 2022-07-11 ENCOUNTER — HOSPITAL ENCOUNTER (OUTPATIENT)
Dept: PERINATAL CARE | Facility: OTHER | Age: 37
Discharge: HOME | End: 2022-07-11
Attending: ADVANCED PRACTICE MIDWIFE
Payer: MEDICAID

## 2022-07-11 ENCOUNTER — PATIENT MESSAGE (OUTPATIENT)
Dept: MATERNAL FETAL MEDICINE | Facility: CLINIC | Age: 37
End: 2022-07-11

## 2022-07-11 DIAGNOSIS — O09.893 SUPERVISION OF OTHER HIGH RISK PREGNANCIES, THIRD TRIMESTER: ICD-10-CM

## 2022-07-11 PROCEDURE — 59025 FETAL NON-STRESS TEST: CPT

## 2022-07-11 PROCEDURE — 76815 OB US LIMITED FETUS(S): CPT | Mod: 26,,, | Performed by: OBSTETRICS & GYNECOLOGY

## 2022-07-11 PROCEDURE — 76815 OB US LIMITED FETUS(S): CPT

## 2022-07-11 PROCEDURE — 59025 PRENATAL TESTING - NST/AFI: ICD-10-PCS | Mod: 26,,, | Performed by: OBSTETRICS & GYNECOLOGY

## 2022-07-11 PROCEDURE — 59025 FETAL NON-STRESS TEST: CPT | Mod: 26,,, | Performed by: OBSTETRICS & GYNECOLOGY

## 2022-07-11 PROCEDURE — 76815 PRENATAL TESTING - NST/AFI: ICD-10-PCS | Mod: 26,,, | Performed by: OBSTETRICS & GYNECOLOGY

## 2022-07-11 NOTE — PROGRESS NOTES
MATERNAL-FETAL MEDICINE   CONSULT NOTE    Provider requesting consultation: Dr. Pate    SUBJECTIVE:     Ms. Dipti Villafana is a 37 y.o.  female with IUP at 32w0d who is seen in consultation by MFM for evaluation and management of:  Problems: Gestational diabetes-                      AMA                     Low lying placenta       presents for new onset gestational diabetes. She is doing well and has no complaints.  She has no decreased fetal movement nor signs or symptoms of PTL or hypertension. She's had no vaginal bleeding.     Medication List with Changes/Refills   Current Medications    BLOOD SUGAR DIAGNOSTIC (CONTOUR TEST STRIPS) STRP    1 strip by Misc.(Non-Drug; Combo Route) route 4 (four) times daily.    BLOOD-GLUCOSE METER (ONETOUCH VERIO METER) MISC    Use as directed to check blood sugar 4 times a day    CITALOPRAM (CELEXA) 20 MG TABLET    TK 1 T PO QD    FEROSUL 325 MG (65 MG IRON) TAB TABLET    Take 1 tablet by mouth 2 (two) times daily.    LANCETS (ONETOUCH DELICA PLUS LANCET) 33 GAUGE MISC    1 lancet by Misc.(Non-Drug; Combo Route) route 3 (three) times daily.    ONDANSETRON (ZOFRAN) 8 MG TABLET    Take 1 tablet (8 mg total) by mouth every 12 (twelve) hours as needed for Nausea.    PRENAT.VITS,RAND,MIN-IRON-FOLIC (PRENATAL VITAMIN) TAB    Take 1 tablet by mouth once daily.    SERTRALINE (ZOLOFT) 50 MG TABLET    Take 50 mg by mouth once daily.       Review of patient's allergies indicates:  No Known Allergies    PMH:  Past Medical History:   Diagnosis Date    Anemia     Gestational diabetes mellitus (GDM) affecting pregnancy 2022       PObHx:  OB History    Para Term  AB Living   3 2 0 0 0 3   SAB IAB Ectopic Multiple Live Births   0 0 0 1 3      # Outcome Date GA Lbr Jaspreet/2nd Weight Sex Delivery Anes PTL Lv   3 Current            2 Para 12   2.722 kg (6 lb) M Vag-Spont   JUAN CARLOS   1A Para 03/12/10    M Vag-Spont   JUAN CARLOS      Birth Comments: twin   1B Para  03/12/10    M Vag-Spont  N JUAN CALROS      Birth Comments: twin       PSH:History reviewed. No pertinent surgical history.    Family history:family history includes Diabetes in her father and mother; Heart failure in her sister; Hypertension in her mother.    Social history: reports that she has never smoked. She has never used smokeless tobacco. She reports previous alcohol use. She reports that she does not use drugs.    Genetic history:  The patient denies any inherited genetic diseases or birth defects in herself or her partner's personal history or family.    Objective:   /80 (BP Location: Left arm, Patient Position: Sitting)   Ht 5' (1.524 m)   Wt 98 kg (216 lb 0.8 oz)   LMP 2021   BMI 42.19 kg/m²     Ultrasound performed. See viewpoint for full ultrasound report.  Fetal size is AGA with the EFW at the 39% and the AC at the 81%.  A limited repeat fetal anatomic survey shows no abnormalities of the structures that were adequately imaged.  AFV is normal.   BPP 8/8. The TV scan with persistent posterior low lying placenta. No vasa previa seen    Significant labs/imaging:    Gluc Fast 70 - 110 mg/dL 89  83 R    Gluc 1 HR mg/dL 179  171 R    Gluc 2 HR mg/dL 164  122 R    Gluc 3 HR mg/dL 127               ASSESSMENT/PLAN:     37 y.o.  female with IUP at 32w0d     We discussed today's scan and her blood sugar log and her recent 3hr GTT, technically she doesn't meet criteria unless we round of 179 to 180 mg/dl and that makes since to do so.  She has been testing blood sugars, most values do not exceed target value. She does however infrequently exceed target fasting values.    Gestational Diabetes    I counseled the patient regarding the risks of gestational diabetes, which include macrosomia, hypertensive disorders of pregnancy,  hypoglycemia, shoulder dystocia/birth trauma, polyhydramnios, and increased risk of  delivery.  Patients requiring medical therapy have an increased risk of  stillbirth. Discussed that  outcome is linked to her ability to achieve glycemic control.  The goal of treatment is to have a fasting blood sugar between 70 and 95 mg/dL and 2 hour postprandials less than 120 mg/dL. Therapy will likely consist of therapy with metformin or insulin. Approximately 30-50% of the time, women who are well-controlled on metformin may require the addition of insulin as the pregnancy progresses. Glyburide therapy has demonstrated inferior results as compared to metformin and insulin, and therefore, is not a first-line choice. Antepartum testing is indicated for those patients requiring medical therapy to reduce the incidence of fetal demise. We discussed dietary counseling and appropriate exercise to improve peripheral insulin resistance. We discussed the frequency of blood sugar monitoring and goal blood sugars. She has met with a diabetic educator this pregnancy. I would recommend obtaining serial growth ultrasounds in the third trimester to monitor for macrosomia.    Most women with GDM are cured by delivery. All medications can be stopped postpartum. However, some women with GDM have undiagnosed type 2 diabetes. Therefore, I recommend obtaining a postpartum fasting blood sugar prior to discharge. Approximately 20% of women with GDM will have glucose intolerance or type 2 DM at the postpartum visit. A 2 hour glucose tolerance test should be performed 6-8 weeks postpartum. If the patient is breastfeeding, it is reasonable to delay this as appropriate. Additionally, women with GDM are at increased risk of developing type 2 DM later in life. Therefore, establishing with a primary MD who can perform annual diabetes screening is appropriate.     Recommendations:   MFM will review blood sugars in 1 week via portal; We reinforced checking 4 x/day and reinforced goal blood sugars   Regimen: Diet alone at this time   If medications are required, recommend growth scans every 4-6 weeks,  starting at 28 weeks gestation   If medications are required, recommend starting antepartum testing at 32 weeks gestation (weekly NST+AFV or BPP); twice weekly testing is recommended if blood sugars are poorly controlled.   -Antepartum testing should be started at 40 weeks for women who do NOT require medications.   Check fasting blood sugar in hospital postpartum.   If normal, discontinue therapy and monitoring and recommend repeat postpartum glucose testing in 6-8 weeks postpartum using a 75 g oral glucose tolerance test.   If fasting blood glucose is between 100-125 mg/dl or impaired glucose tolerance is noted on 2 hour glucose test, refer to primary care as appropriate.    An ultrasound for estimated fetal weight should be obtained within 3 weeks of anticipated delivery; if the EFW is >= 4500 grams, a  should be offered.    Delivery timing:  Well controlled with diet: 39 0/7 - 40 6/7 weeks gestation  Oral agent or insulin required: 39 0/7 - 39 6/ 7 weeks gestation  Poorly controlled on oral agent or insulin: 38 0/7 - 38 6/7 weeks gestation      Low lying placenta:   A low-lying placenta was identified on ultrasound today. Over 90% of low-lying placentas identified in the mid-trimester of pregnancy will resolve by term. Initial follow-up assessment of placental location is recommended at ~32 weeks. If the low-lying placenta persists at that time, a second f/u exam at 36 weeks may be recommended. A f/u study has been scheduled to reassess placental location at ~32 weeks.     Recommendations:  1.Pelvic rest  2. Labor precautions given  3. If bleeding occurs seek immediate medical attention  FOLLOW UP:   F/u in 3-4  weeks for US/MFM visit for growth , placenta location and blood sugar management      45  minutes of total time spent on the encounter, which includes face to face time and non-face to face time preparing to see the patient (eg, review of tests), obtaining and/or reviewing separately obtained  history, documenting clinical information in the electronic or other health record, independently interpreting results (not separately reported) and communicating results to the patient/family/caregiver, or care coordination (not separately reported).      Niki Bishop  Maternal-Fetal Medicine    Electronically Signed by Niki Bishop July 11, 2022

## 2022-07-14 ENCOUNTER — TELEPHONE (OUTPATIENT)
Dept: MATERNAL FETAL MEDICINE | Facility: CLINIC | Age: 37
End: 2022-07-14
Payer: MEDICAID

## 2022-07-14 ENCOUNTER — PATIENT MESSAGE (OUTPATIENT)
Dept: MATERNAL FETAL MEDICINE | Facility: CLINIC | Age: 37
End: 2022-07-14
Payer: MEDICAID

## 2022-07-14 ENCOUNTER — DOCUMENTATION ONLY (OUTPATIENT)
Dept: MATERNAL FETAL MEDICINE | Facility: CLINIC | Age: 37
End: 2022-07-14
Payer: MEDICAID

## 2022-07-14 DIAGNOSIS — O24.419 GESTATIONAL DIABETES MELLITUS (GDM) IN THIRD TRIMESTER, GESTATIONAL DIABETES METHOD OF CONTROL UNSPECIFIED: Primary | ICD-10-CM

## 2022-07-14 RX ORDER — METFORMIN HYDROCHLORIDE 500 MG/1
500 TABLET, EXTENDED RELEASE ORAL
Qty: 60 TABLET | Refills: 0 | Status: ON HOLD | OUTPATIENT
Start: 2022-07-14 | End: 2022-08-04 | Stop reason: SDUPTHER

## 2022-07-14 NOTE — TELEPHONE ENCOUNTER
Called pt to review blood sugar log , medication, and prenatal testing  Pt stated that she had just spoke to Dr Bishop about not taking medication or going testing.  Reviewed with Dr Bishop   Informed pt that she will need to start medication and prenatal testing based on her current blood sugar log

## 2022-07-14 NOTE — PROGRESS NOTES
Glucose log reviewed from -. Patient continues to have persistently elevated fasting values, although mild. Plan to start Metformin 500 mg qHS.     Fastin/7 elevated  PP Breakfast:  elevated  PP Lunch:  elevated  PP Dinner:  elevated          Patient now with diagnosis of A2GDM, will need weekly antepartum testing.    Korina Suarez MD  PGY5  Maternal Fetal Medicine

## 2022-07-18 ENCOUNTER — TELEPHONE (OUTPATIENT)
Dept: MATERNAL FETAL MEDICINE | Facility: CLINIC | Age: 37
End: 2022-07-18
Payer: MEDICAID

## 2022-07-18 ENCOUNTER — ANESTHESIA EVENT (OUTPATIENT)
Dept: OBSTETRICS AND GYNECOLOGY | Facility: OTHER | Age: 37
End: 2022-07-18

## 2022-07-18 ENCOUNTER — PATIENT MESSAGE (OUTPATIENT)
Dept: MATERNAL FETAL MEDICINE | Facility: CLINIC | Age: 37
End: 2022-07-18
Payer: MEDICAID

## 2022-07-18 ENCOUNTER — HOSPITAL ENCOUNTER (OUTPATIENT)
Facility: OTHER | Age: 37
Discharge: HOME | End: 2022-07-19
Attending: OBSTETRICS & GYNECOLOGY | Admitting: OBSTETRICS & GYNECOLOGY
Payer: MEDICAID

## 2022-07-18 ENCOUNTER — ANESTHESIA (OUTPATIENT)
Dept: OBSTETRICS AND GYNECOLOGY | Facility: OTHER | Age: 37
End: 2022-07-18

## 2022-07-18 DIAGNOSIS — Z3A.33 33 WEEKS GESTATION OF PREGNANCY: ICD-10-CM

## 2022-07-18 DIAGNOSIS — N93.9 VAGINAL BLEEDING: Primary | ICD-10-CM

## 2022-07-18 DIAGNOSIS — O44.40 LOW-LYING PLACENTA: ICD-10-CM

## 2022-07-18 LAB
ABO + RH BLD: NORMAL
BASOPHILS # BLD AUTO: 0.02 K/UL (ref 0–0.2)
BASOPHILS NFR BLD: 0.3 % (ref 0–1.9)
BILIRUB UR QL STRIP: NEGATIVE
BLD GP AB SCN CELLS X3 SERPL QL: NORMAL
CLARITY UR: CLEAR
COLOR UR: YELLOW
DIFFERENTIAL METHOD: ABNORMAL
EOSINOPHIL # BLD AUTO: 0.1 K/UL (ref 0–0.5)
EOSINOPHIL NFR BLD: 0.8 % (ref 0–8)
ERYTHROCYTE [DISTWIDTH] IN BLOOD BY AUTOMATED COUNT: 15 % (ref 11.5–14.5)
GLUCOSE UR QL STRIP: NEGATIVE
HCT VFR BLD AUTO: 35.1 % (ref 37–48.5)
HGB BLD-MCNC: 11.1 G/DL (ref 12–16)
HGB UR QL STRIP: ABNORMAL
HIV 1+2 AB+HIV1 P24 AG SERPL QL IA: NEGATIVE
IMM GRANULOCYTES # BLD AUTO: 0.04 K/UL (ref 0–0.04)
IMM GRANULOCYTES NFR BLD AUTO: 0.5 % (ref 0–0.5)
KETONES UR QL STRIP: ABNORMAL
LEUKOCYTE ESTERASE UR QL STRIP: NEGATIVE
LYMPHOCYTES # BLD AUTO: 1.6 K/UL (ref 1–4.8)
LYMPHOCYTES NFR BLD: 21.8 % (ref 18–48)
MCH RBC QN AUTO: 25.1 PG (ref 27–31)
MCHC RBC AUTO-ENTMCNC: 31.6 G/DL (ref 32–36)
MCV RBC AUTO: 79 FL (ref 82–98)
MICROSCOPIC COMMENT: NORMAL
MONOCYTES # BLD AUTO: 0.9 K/UL (ref 0.3–1)
MONOCYTES NFR BLD: 12.3 % (ref 4–15)
NEUTROPHILS # BLD AUTO: 4.7 K/UL (ref 1.8–7.7)
NEUTROPHILS NFR BLD: 64.3 % (ref 38–73)
NITRITE UR QL STRIP: NEGATIVE
NRBC BLD-RTO: 0 /100 WBC
PH UR STRIP: 6 [PH] (ref 5–8)
PLATELET # BLD AUTO: 317 K/UL (ref 150–450)
PMV BLD AUTO: 9 FL (ref 9.2–12.9)
POCT GLUCOSE: 106 MG/DL (ref 70–110)
PROT UR QL STRIP: NEGATIVE
RBC # BLD AUTO: 4.42 M/UL (ref 4–5.4)
RBC #/AREA URNS HPF: 0 /HPF (ref 0–4)
SP GR UR STRIP: <=1.005 (ref 1–1.03)
URN SPEC COLLECT METH UR: ABNORMAL
UROBILINOGEN UR STRIP-ACNC: NEGATIVE EU/DL
WBC # BLD AUTO: 7.31 K/UL (ref 3.9–12.7)

## 2022-07-18 PROCEDURE — 99284 PR EMERGENCY DEPT VISIT,LEVEL IV: ICD-10-PCS | Mod: 25,,, | Performed by: OBSTETRICS & GYNECOLOGY

## 2022-07-18 PROCEDURE — 99284 EMERGENCY DEPT VISIT MOD MDM: CPT | Mod: 25,,, | Performed by: OBSTETRICS & GYNECOLOGY

## 2022-07-18 PROCEDURE — 87389 HIV-1 AG W/HIV-1&-2 AB AG IA: CPT | Performed by: STUDENT IN AN ORGANIZED HEALTH CARE EDUCATION/TRAINING PROGRAM

## 2022-07-18 PROCEDURE — 96372 THER/PROPH/DIAG INJ SC/IM: CPT | Performed by: STUDENT IN AN ORGANIZED HEALTH CARE EDUCATION/TRAINING PROGRAM

## 2022-07-18 PROCEDURE — G0378 HOSPITAL OBSERVATION PER HR: HCPCS

## 2022-07-18 PROCEDURE — 83036 HEMOGLOBIN GLYCOSYLATED A1C: CPT | Performed by: STUDENT IN AN ORGANIZED HEALTH CARE EDUCATION/TRAINING PROGRAM

## 2022-07-18 PROCEDURE — 81000 URINALYSIS NONAUTO W/SCOPE: CPT

## 2022-07-18 PROCEDURE — 85025 COMPLETE CBC W/AUTO DIFF WBC: CPT | Performed by: STUDENT IN AN ORGANIZED HEALTH CARE EDUCATION/TRAINING PROGRAM

## 2022-07-18 PROCEDURE — 86850 RBC ANTIBODY SCREEN: CPT | Performed by: STUDENT IN AN ORGANIZED HEALTH CARE EDUCATION/TRAINING PROGRAM

## 2022-07-18 PROCEDURE — 99285 EMERGENCY DEPT VISIT HI MDM: CPT | Mod: 25

## 2022-07-18 PROCEDURE — 25000003 PHARM REV CODE 250: Performed by: STUDENT IN AN ORGANIZED HEALTH CARE EDUCATION/TRAINING PROGRAM

## 2022-07-18 PROCEDURE — 59025 PR FETAL 2N-STRESS TEST: ICD-10-PCS | Mod: 26,,, | Performed by: OBSTETRICS & GYNECOLOGY

## 2022-07-18 PROCEDURE — 59025 FETAL NON-STRESS TEST: CPT

## 2022-07-18 PROCEDURE — 59025 FETAL NON-STRESS TEST: CPT | Mod: 26,,, | Performed by: OBSTETRICS & GYNECOLOGY

## 2022-07-18 PROCEDURE — 63600175 PHARM REV CODE 636 W HCPCS: Performed by: STUDENT IN AN ORGANIZED HEALTH CARE EDUCATION/TRAINING PROGRAM

## 2022-07-18 PROCEDURE — 87086 URINE CULTURE/COLONY COUNT: CPT

## 2022-07-18 PROCEDURE — 86592 SYPHILIS TEST NON-TREP QUAL: CPT | Performed by: STUDENT IN AN ORGANIZED HEALTH CARE EDUCATION/TRAINING PROGRAM

## 2022-07-18 PROCEDURE — 87081 CULTURE SCREEN ONLY: CPT | Mod: 59 | Performed by: STUDENT IN AN ORGANIZED HEALTH CARE EDUCATION/TRAINING PROGRAM

## 2022-07-18 PROCEDURE — 86920 COMPATIBILITY TEST SPIN: CPT | Performed by: STUDENT IN AN ORGANIZED HEALTH CARE EDUCATION/TRAINING PROGRAM

## 2022-07-18 RX ORDER — ONDANSETRON 8 MG/1
8 TABLET, ORALLY DISINTEGRATING ORAL EVERY 8 HOURS PRN
Status: DISCONTINUED | OUTPATIENT
Start: 2022-07-18 | End: 2022-07-19 | Stop reason: HOSPADM

## 2022-07-18 RX ORDER — IRON POLYSACCHARIDE COMPLEX 150 MG
150 CAPSULE ORAL DAILY
Status: DISCONTINUED | OUTPATIENT
Start: 2022-07-19 | End: 2022-07-19 | Stop reason: HOSPADM

## 2022-07-18 RX ORDER — SERTRALINE HYDROCHLORIDE 50 MG/1
50 TABLET, FILM COATED ORAL DAILY
Status: DISCONTINUED | OUTPATIENT
Start: 2022-07-19 | End: 2022-07-19 | Stop reason: HOSPADM

## 2022-07-18 RX ORDER — PROCHLORPERAZINE EDISYLATE 5 MG/ML
5 INJECTION INTRAMUSCULAR; INTRAVENOUS EVERY 6 HOURS PRN
Status: DISCONTINUED | OUTPATIENT
Start: 2022-07-18 | End: 2022-07-19 | Stop reason: HOSPADM

## 2022-07-18 RX ORDER — ONDANSETRON 4 MG/1
4 TABLET, FILM COATED ORAL ONCE
Status: COMPLETED | OUTPATIENT
Start: 2022-07-18 | End: 2022-07-18

## 2022-07-18 RX ORDER — SODIUM CHLORIDE, SODIUM LACTATE, POTASSIUM CHLORIDE, CALCIUM CHLORIDE 600; 310; 30; 20 MG/100ML; MG/100ML; MG/100ML; MG/100ML
INJECTION, SOLUTION INTRAVENOUS CONTINUOUS
Status: DISCONTINUED | OUTPATIENT
Start: 2022-07-18 | End: 2022-07-19

## 2022-07-18 RX ORDER — GLUCAGON 1 MG
1 KIT INJECTION
Status: DISCONTINUED | OUTPATIENT
Start: 2022-07-18 | End: 2022-07-19 | Stop reason: HOSPADM

## 2022-07-18 RX ORDER — DIPHENHYDRAMINE HCL 25 MG
25 CAPSULE ORAL EVERY 4 HOURS PRN
Status: DISCONTINUED | OUTPATIENT
Start: 2022-07-18 | End: 2022-07-19 | Stop reason: HOSPADM

## 2022-07-18 RX ORDER — BETAMETHASONE SODIUM PHOSPHATE AND BETAMETHASONE ACETATE 3; 3 MG/ML; MG/ML
12 INJECTION, SUSPENSION INTRA-ARTICULAR; INTRALESIONAL; INTRAMUSCULAR; SOFT TISSUE
Status: DISCONTINUED | OUTPATIENT
Start: 2022-07-18 | End: 2022-07-19 | Stop reason: HOSPADM

## 2022-07-18 RX ORDER — PRENATAL WITH FERROUS FUM AND FOLIC ACID 3080; 920; 120; 400; 22; 1.84; 3; 20; 10; 1; 12; 200; 27; 25; 2 [IU]/1; [IU]/1; MG/1; [IU]/1; MG/1; MG/1; MG/1; MG/1; MG/1; MG/1; UG/1; MG/1; MG/1; MG/1; MG/1
1 TABLET ORAL DAILY
Status: DISCONTINUED | OUTPATIENT
Start: 2022-07-19 | End: 2022-07-19 | Stop reason: HOSPADM

## 2022-07-18 RX ORDER — ACETAMINOPHEN 325 MG/1
650 TABLET ORAL EVERY 6 HOURS PRN
Status: DISCONTINUED | OUTPATIENT
Start: 2022-07-18 | End: 2022-07-19 | Stop reason: HOSPADM

## 2022-07-18 RX ORDER — SIMETHICONE 80 MG
1 TABLET,CHEWABLE ORAL EVERY 6 HOURS PRN
Status: DISCONTINUED | OUTPATIENT
Start: 2022-07-18 | End: 2022-07-19 | Stop reason: HOSPADM

## 2022-07-18 RX ORDER — INSULIN ASPART 100 [IU]/ML
1-10 INJECTION, SOLUTION INTRAVENOUS; SUBCUTANEOUS EVERY 6 HOURS PRN
Status: DISCONTINUED | OUTPATIENT
Start: 2022-07-18 | End: 2022-07-19

## 2022-07-18 RX ORDER — AMOXICILLIN 250 MG
1 CAPSULE ORAL NIGHTLY PRN
Status: DISCONTINUED | OUTPATIENT
Start: 2022-07-18 | End: 2022-07-19 | Stop reason: HOSPADM

## 2022-07-18 RX ORDER — SODIUM CHLORIDE 0.9 % (FLUSH) 0.9 %
10 SYRINGE (ML) INJECTION
Status: DISCONTINUED | OUTPATIENT
Start: 2022-07-18 | End: 2022-07-19 | Stop reason: HOSPADM

## 2022-07-18 RX ORDER — CITALOPRAM 20 MG/1
20 TABLET, FILM COATED ORAL DAILY
Status: DISCONTINUED | OUTPATIENT
Start: 2022-07-19 | End: 2022-07-19 | Stop reason: HOSPADM

## 2022-07-18 RX ORDER — DIPHENHYDRAMINE HYDROCHLORIDE 50 MG/ML
25 INJECTION INTRAMUSCULAR; INTRAVENOUS EVERY 4 HOURS PRN
Status: DISCONTINUED | OUTPATIENT
Start: 2022-07-18 | End: 2022-07-19 | Stop reason: HOSPADM

## 2022-07-18 RX ADMIN — ONDANSETRON HYDROCHLORIDE 4 MG: 4 TABLET, FILM COATED ORAL at 08:07

## 2022-07-18 RX ADMIN — BETAMETHASONE SODIUM PHOSPHATE AND BETAMETHASONE ACETATE 12 MG: 3; 3 INJECTION, SUSPENSION INTRA-ARTICULAR; INTRALESIONAL; INTRAMUSCULAR at 08:07

## 2022-07-18 NOTE — LETTER
July 19, 2022         [address]  [address]  [facility] [address]  Phone: [phone]  Fax: [phone]       Patient: Dipti Villafana   YOB: 1985  Date of Visit: 07/19/2022    To Whom It May Concern:    Enrrique Villafana  was at [facility] on 07/19/2022. She may return to work/school on 7/21/2022. If you have any questions or concerns, or if I can be of further assistance, please do not hesitate to contact me.    Sincerely,      Farzana Bolden RN

## 2022-07-18 NOTE — TELEPHONE ENCOUNTER
Portal message sent to patient with this information.     ----- Message from Korina Suarez MD sent at 7/18/2022  4:13 PM CDT -----  Regarding: Glucose logs  Hey! No changes today, we don't have enough days of her on the new meds. Encourage her to remember to take meds and check 2 hours after meals. Thanks!

## 2022-07-19 VITALS
DIASTOLIC BLOOD PRESSURE: 64 MMHG | HEART RATE: 103 BPM | HEIGHT: 60 IN | TEMPERATURE: 97 F | SYSTOLIC BLOOD PRESSURE: 114 MMHG | BODY MASS INDEX: 42.54 KG/M2 | WEIGHT: 216.69 LBS | OXYGEN SATURATION: 97 % | RESPIRATION RATE: 16 BRPM

## 2022-07-19 PROBLEM — Z3A.33 33 WEEKS GESTATION OF PREGNANCY: Status: ACTIVE | Noted: 2022-07-19

## 2022-07-19 PROBLEM — Z30.09 UNWANTED FERTILITY: Status: ACTIVE | Noted: 2022-07-19

## 2022-07-19 PROBLEM — O44.40 LOW-LYING PLACENTA: Status: ACTIVE | Noted: 2022-07-19

## 2022-07-19 LAB
BLD PROD TYP BPU: NORMAL
BLOOD UNIT EXPIRATION DATE: NORMAL
BLOOD UNIT TYPE CODE: 5100
BLOOD UNIT TYPE: NORMAL
CODING SYSTEM: NORMAL
DISPENSE STATUS: NORMAL
ESTIMATED AVG GLUCOSE: 128 MG/DL (ref 68–131)
HBA1C MFR BLD: 6.1 % (ref 4–5.6)
NUM UNITS TRANS PACKED RBC: NORMAL
POCT GLUCOSE: 119 MG/DL (ref 70–110)
POCT GLUCOSE: 155 MG/DL (ref 70–110)
RPR SER QL: NORMAL

## 2022-07-19 PROCEDURE — 59025 PR FETAL 2N-STRESS TEST: ICD-10-PCS | Mod: 26,,, | Performed by: OBSTETRICS & GYNECOLOGY

## 2022-07-19 PROCEDURE — 25000003 PHARM REV CODE 250: Performed by: STUDENT IN AN ORGANIZED HEALTH CARE EDUCATION/TRAINING PROGRAM

## 2022-07-19 PROCEDURE — 99219 PR INITIAL OBSERVATION CARE,LEVL II: CPT | Mod: 25,TH,, | Performed by: OBSTETRICS & GYNECOLOGY

## 2022-07-19 PROCEDURE — G0378 HOSPITAL OBSERVATION PER HR: HCPCS

## 2022-07-19 PROCEDURE — 63600175 PHARM REV CODE 636 W HCPCS: Performed by: STUDENT IN AN ORGANIZED HEALTH CARE EDUCATION/TRAINING PROGRAM

## 2022-07-19 PROCEDURE — 59025 FETAL NON-STRESS TEST: CPT | Mod: 26,,, | Performed by: OBSTETRICS & GYNECOLOGY

## 2022-07-19 PROCEDURE — 99219 PR INITIAL OBSERVATION CARE,LEVL II: ICD-10-PCS | Mod: 25,TH,, | Performed by: OBSTETRICS & GYNECOLOGY

## 2022-07-19 PROCEDURE — 96372 THER/PROPH/DIAG INJ SC/IM: CPT | Performed by: STUDENT IN AN ORGANIZED HEALTH CARE EDUCATION/TRAINING PROGRAM

## 2022-07-19 PROCEDURE — 96374 THER/PROPH/DIAG INJ IV PUSH: CPT | Performed by: STUDENT IN AN ORGANIZED HEALTH CARE EDUCATION/TRAINING PROGRAM

## 2022-07-19 PROCEDURE — 25000003 PHARM REV CODE 250

## 2022-07-19 RX ORDER — IBUPROFEN 200 MG
24 TABLET ORAL
Status: DISCONTINUED | OUTPATIENT
Start: 2022-07-19 | End: 2022-07-19 | Stop reason: HOSPADM

## 2022-07-19 RX ORDER — INSULIN ASPART 100 [IU]/ML
0-5 INJECTION, SOLUTION INTRAVENOUS; SUBCUTANEOUS
Status: DISCONTINUED | OUTPATIENT
Start: 2022-07-19 | End: 2022-07-19 | Stop reason: HOSPADM

## 2022-07-19 RX ORDER — IBUPROFEN 200 MG
16 TABLET ORAL
Status: DISCONTINUED | OUTPATIENT
Start: 2022-07-19 | End: 2022-07-19 | Stop reason: HOSPADM

## 2022-07-19 RX ORDER — CALCIUM CARBONATE 200(500)MG
500 TABLET,CHEWABLE ORAL 3 TIMES DAILY PRN
Status: DISCONTINUED | OUTPATIENT
Start: 2022-07-19 | End: 2022-07-19 | Stop reason: HOSPADM

## 2022-07-19 RX ADMIN — CALCIUM CARBONATE (ANTACID) CHEW TAB 500 MG 500 MG: 500 CHEW TAB at 01:07

## 2022-07-19 RX ADMIN — PROCHLORPERAZINE EDISYLATE 5 MG: 5 INJECTION INTRAMUSCULAR; INTRAVENOUS at 01:07

## 2022-07-19 RX ADMIN — PRENATAL VIT W/ FE FUMARATE-FA TAB 27-0.8 MG 1 TABLET: 27-0.8 TAB at 09:07

## 2022-07-19 RX ADMIN — Medication 150 MG: at 09:07

## 2022-07-19 RX ADMIN — BETAMETHASONE SODIUM PHOSPHATE AND BETAMETHASONE ACETATE 12 MG: 3; 3 INJECTION, SUSPENSION INTRA-ARTICULAR; INTRALESIONAL; INTRAMUSCULAR at 12:07

## 2022-07-19 NOTE — ANESTHESIA PREPROCEDURE EVALUATION
[facility]  Anesthesia Pre-Operative Evaluation         Patient Name: Dipti Villafana  YOB: 1985  MRN: [id number]    2022      Dipti Villafana is a 37 y.o. female  @ 33w5d who presents due to vaginal bleeding. IUP complicated by low-lying placenta without previous bleeding, gDM (metformin), obesity (BMI 42). Denies cHTN, asthma, bleeding diathesis, anticoag, spinal d/o or prior spinal surgery.    Recent Labs     22   HCT 35.1*   HGB 11.1*       OB History    Para Term  AB Living   3 2 2 0 0 3   SAB IAB Ectopic Multiple Live Births   0 0 0 1 3      # Outcome Date GA Lbr Jaspreet/2nd Weight Sex Delivery Anes PTL Lv   3 Current            2 Term 12   2.722 kg (6 lb) M Vag-Spont   JUAN CARLOS   1A Term 03/12/10    M    JUAN CARLOS      Birth Comments: twin   1B Term 03/12/10    M Vag-Spont  N JUAN CARLOS      Birth Comments: twin       Review of patient's allergies indicates:  No Known Allergies    Wt Readings from Last 1 Encounters:   22 98.3 kg (216 lb 11.4 oz)       BP Readings from Last 3 Encounters:   22 113/65   22 112/60   22 116/80       Patient Active Problem List   Diagnosis    Depressive disorder    AMA (advanced maternal age) multigravida 35+    Morbid obesity with BMI of 40.0-44.9, adult    Diet controlled gestational diabetes mellitus (GDM), antepartum    Mental disorder affecting pregnancy, antepartum    Obesity affecting pregnancy, antepartum    Sterilization    Low lying placenta nos or without hemorrhage, third trimester    Vaginal bleeding       History reviewed. No pertinent surgical history.    Social History     Socioeconomic History    Marital status: Single   Tobacco Use    Smoking status: Never Smoker    Smokeless tobacco: Never Used   Substance and Sexual Activity    Alcohol use: Not Currently     Comment: occasional    Drug use: No    Sexual activity: Not Currently     Partners: Male     Birth  control/protection: None     Comment: Mirena 2012         Chemistry        Component Value Date/Time     (L) 10/12/2011 1810    K 3.6 10/12/2011 1810     10/12/2011 1810    CO2 22 (L) 10/12/2011 1810    BUN 6 10/12/2011 1810    CREATININE 0.7 10/12/2011 1810    GLU 87 10/12/2011 1810        Component Value Date/Time    CALCIUM 9.3 10/12/2011 1810    ALKPHOS 53 (L) 10/12/2011 1810    AST 21 10/12/2011 1810    ALT 15 10/12/2011 1810    BILITOT 0.2 10/12/2011 1810    ESTGFRAFRICA >60 10/12/2011 1810    EGFRNONAA >60 10/12/2011 1810            Lab Results   Component Value Date    WBC 7.31 07/18/2022    HGB 11.1 (L) 07/18/2022    HCT 35.1 (L) 07/18/2022    MCV 79 (L) 07/18/2022     07/18/2022       No results for input(s): PT, INR, PROTIME, APTT in the last 72 hours.        Pre-op Assessment    I have reviewed the Patient Summary Reports.     I have reviewed the Nursing Notes. I have reviewed the NPO Status.   I have reviewed the Medications.     Review of Systems  Anesthesia Hx:  No problems with previous Anesthesia Denies Hx of Anesthetic complications  Denies Family Hx of Anesthesia complications.   Denies Personal Hx of Anesthesia complications.   Social:  Non-Smoker    Hematology/Oncology:  Hematology Normal   Oncology Normal     EENT/Dental:EENT/Dental Normal   Cardiovascular:  Cardiovascular Normal     Pulmonary:  Pulmonary Normal    Renal/:  Renal/ Normal     Hepatic/GI:  Hepatic/GI Normal    Musculoskeletal:  Musculoskeletal Normal    Neurological:  Neurology Normal    Endocrine:  Endocrine Normal    Psych:  Psychiatric Normal           Physical Exam  General: Well nourished, Cooperative and Alert    Airway:  Mallampati: III   Mouth Opening: Normal  TM Distance: Normal  Tongue: Normal  Neck ROM: Normal ROM    Dental:  Intact    Chest/Lungs:  Normal Respiratory Rate    Heart:  Rate: Normal  Rhythm: Regular Rhythm        Anesthesia Plan  Type of Anesthesia, risks & benefits  discussed:    Anesthesia Type: Gen ETT, MAC, Epidural, Spinal, CSE  Intra-op Monitoring Plan: Standard ASA Monitors  Post Op Pain Control Plan: multimodal analgesia, IV/PO Opioids PRN, epidural analgesia and intrathecal opioid  Airway Plan: Video, Post-Induction  Informed Consent: Informed consent signed with the Patient and all parties understand the risks and agree with anesthesia plan.  All questions answered.   ASA Score: 3  Day of Surgery Review of History & Physical: H&P Update referred to the surgeon/provider.    Ready For Surgery From Anesthesia Perspective.     .

## 2022-07-19 NOTE — ASSESSMENT & PLAN NOTE
- Pre-pregnancy BMI 41  - Will need postpartum lovenox BID  - Consider Lovenox antepartum if bleeding stabilizes

## 2022-07-19 NOTE — SUBJECTIVE & OBJECTIVE
Obstetric HPI:  Patient reports no contractions, active fetal movement, no vaginal bleeding , no loss of fluid     OB History    Para Term  AB Living   3 2 2 0 0 3   SAB IAB Ectopic Multiple Live Births   0 0 0 1 3      # Outcome Date GA Lbr Jaspreet/2nd Weight Sex Delivery Anes PTL Lv   3 Current            2 Term 12   2.722 kg (6 lb) M Vag-Spont   JUAN CARLOS   1A Term 03/12/10    M    JUAN CARLOS      Birth Comments: twin   1B Term 03/12/10    M Vag-Spont  N JUAN CARLOS      Birth Comments: twin     Past Medical History:   Diagnosis Date    Anemia     Gestational diabetes mellitus (GDM) affecting pregnancy 2022     History reviewed. No pertinent surgical history.    PTA Medications   Medication Sig    blood sugar diagnostic (CONTOUR TEST STRIPS) Strp 1 strip by Misc.(Non-Drug; Combo Route) route 4 (four) times daily.    blood-glucose meter (ONETOUCH VERIO METER) Misc Use as directed to check blood sugar 4 times a day    citalopram (CELEXA) 20 MG tablet TK 1 T PO QD    FEROSUL 325 mg (65 mg iron) Tab tablet Take 1 tablet by mouth 2 (two) times daily.    lancets (ONETOUCH DELICA PLUS LANCET) 33 gauge Misc 1 lancet by Misc.(Non-Drug; Combo Route) route 3 (three) times daily.    metFORMIN (GLUCOPHAGE-XR) 500 MG ER 24hr tablet Take 1 tablet (500 mg total) by mouth daily with dinner or evening meal. for 60 doses    ondansetron (ZOFRAN) 8 MG tablet Take 1 tablet (8 mg total) by mouth every 12 (twelve) hours as needed for Nausea. (Patient not taking: No sig reported)    prenat.vits,tyrone,min-iron-folic (PRENATAL VITAMIN) Tab Take 1 tablet by mouth once daily.    sertraline (ZOLOFT) 50 MG tablet Take 50 mg by mouth once daily.       Review of patient's allergies indicates:  No Known Allergies     Family History       Problem Relation (Age of Onset)    Diabetes Father, Mother    Heart failure Sister    Hypertension Mother          Tobacco Use    Smoking status: Never Smoker    Smokeless tobacco: Never Used   Substance and Sexual  Activity    Alcohol use: Not Currently     Comment: occasional    Drug use: No    Sexual activity: Not Currently     Partners: Male     Birth control/protection: None     Comment: Mirena 2012     Review of Systems   Gastrointestinal:  Negative for abdominal pain.   Genitourinary:  Negative for dysuria, menorrhagia, pelvic pain and vaginal bleeding.    Objective:     Vital Signs (Most Recent):  Temp: 98 °F (36.7 °C) (07/19/22 0531)  Pulse: 90 (07/19/22 0531)  Resp: 18 (07/19/22 0531)  BP: (!) 97/51 (07/19/22 0531)  SpO2: 96 % (07/19/22 0531) Vital Signs (24h Range):  Temp:  [97.5 °F (36.4 °C)-98.3 °F (36.8 °C)] 98 °F (36.7 °C)  Pulse:  [] 90  Resp:  [16-18] 18  SpO2:  [96 %-99 %] 96 %  BP: ()/(51-76) 97/51     Weight: 98.3 kg (216 lb 11.4 oz)  Body mass index is 42.32 kg/m².    FHT: 125 bpm, mod variability, + accels, - decels; reassuring and reactive   TOCO:  7-10 minutes; patient not feeling     Physical Exam:   Constitutional: She appears well-developed and well-nourished.     Eyes: EOM are normal.     Cardiovascular:  Normal rate.             Pulmonary/Chest: Effort normal.        Abdominal: Soft.     Genitourinary:    Genitourinary Comments: On admission, small amount of bright red blood in vaginal vault- no active bleeding from os. Visually closed.              Musculoskeletal: Normal range of motion.       Neurological: She is alert.    Skin: Skin is warm.    Psychiatric: She has a normal mood and affect.     Cervix: Deferred     Significant Labs:  Lab Results   Component Value Date    GROUPTRH O POS 07/18/2022    HEPBSAG Negative 01/21/2022

## 2022-07-19 NOTE — HOSPITAL COURSE
07/18/2022: HD#1. Admit in setting of vaginal bleeding & low lying placenta. Start BMZ course. Strict pad counts.   07/19/2022: HD#2. NAEON. No obstetric complaints, including no bleeding since admission. CTX visualized on TOCO, patient denies CTX. BMZ 07/18-07/19.

## 2022-07-19 NOTE — ASSESSMENT & PLAN NOTE
- Likely bleed in setting of low lying placenta  - Admit for observation & pad counts  - 2u pRBC held on admission; high risk for PPH  - MVP 6cm, no concern for PPROM  - strict pad counts

## 2022-07-19 NOTE — CARE UPDATE
MD to bedside for fetal decels. Difficulty keeping fetus on monitor, however per RN audible decels to 50s heard in patient room with contractions. [place] with irregular CTX, q2-5 minutes. On evaluation, patient denies any vaginal bleeding or cramping. After in room monitoring, FHT improved baseline 120 with mod variability, + accels - decels, overall reassuring. Will continue to monitor and consider moving to LD if signs of labor or if recurrent decels. Patient to remain on ante at this time.     Madelyn Fan MD  PGY 2  Obstetrics and Gynecology

## 2022-07-19 NOTE — ASSESSMENT & PLAN NOTE
- Patient admitted in the setting of sentinel bleeding. On admission, no acute bleeding noted. Overnight, patient reports no additional bleeding. To be discharged home today.   - Last scan 07/11 with placental edge 18mm from cervical os (posterior), no evidence of vasa previa

## 2022-07-19 NOTE — ASSESSMENT & PLAN NOTE
- [place] bleed in setting of low lying placenta  - Admit for observation & pad counts  - 2u pRBC held on admission; high risk for PPH  - MVP 6cm, no concern for PPROM

## 2022-07-19 NOTE — DISCHARGE SUMMARY
[facility] ([place])  Obstetrics  Discharge Summary      Patient Name: Dipti Villafana  MRN: [id number]  Admission Date: 2022  Hospital Length of Stay: 0 days  Discharge Date and Time:  2022 12:53 PM  Attending Physician: SRINIVASAN Pate MD   Discharging Provider: Keya Baca MD   Primary Care Provider: Primary Doctor No    HPI: Dipti Villafana is a 37 y.o. [id number] at 33w5d presents complaining of vaginal bleeding x 1 hour.     She reports she stood up from lying down and felt cold fluid. She went to the bathroom and noticed hiram blood that soaked her underwear.      This IUP is complicated by low-lying placenta without previous bleeding (18mm from cervical os), GDMA2(on metformin), MO.  Patient admits to some cramping when the bleeding started but since has not felt anymore.     While in the OB ED, patient confirmed to have blood in vaginal vault & decision was made to admit for sentinel bleed.       FHT: 130bpm, mod variability, + accels, - decels; Cat 2   TOCO:  Q 3-5 min    Hospital Course:   2022: HD#1. Admit in setting of vaginal bleeding & low lying placenta. Start BMZ course. Strict pad counts.   2022: HD#2. NAEON. No obstetric complaints, including no bleeding since admission. CTX visualized on TOCO, patient denies CTX. BMZ -.     No additional bleeding. Patient denies CTX. Repeat exam consistent with unchanged cervix and no additional bleeding, evidence of rupture noted. MVP 4.17 cm. Patient to be discharged after receiving 2nd dose of BMZ.       Final Active Diagnoses:    Diagnosis Date Noted POA    PRINCIPAL PROBLEM:  Vaginal bleeding [N93.9] 2022 Yes    Low-lying placenta [O44.40] 2022 Yes    Unwanted fertility [Z30.09] 2022 Yes    33 weeks gestation of pregnancy [Z3A.33] 2022 Not Applicable    Obesity affecting pregnancy, antepartum [O99.210] 07/10/2022 Yes    Diet controlled gestational diabetes mellitus (GDM), antepartum  [O24.410] 06/17/2022 Yes    AMA (advanced maternal age) multigravida 35+ [O09.529] 01/21/2022 Yes    Depressive disorder [F32.9] 05/24/2019 Yes      Problems Resolved During this Admission:        Immunizations     None          This patient has no babies on file.  Pending Diagnostic Studies:     None          Discharged Condition: good    Disposition: Home or Self Care    Follow Up:   Follow-up Information     MARGARITA Pate MD Follow up on 7/25/2022.    Specialties: Obstetrics and Gynecology, Obstetrics, Gynecology  Why: Prenatal Appointment  Contact information:  [address] [address]  [phone]                       Patient Instructions:      Diet Adult Regular     Pelvic Rest   Order Comments: Pelvic rest until follow up visit. Nothing in vagina -no sex, tampons, douching, etc.     No dressing needed     Notify your health care provider if you experience any of the following:  temperature >100.4     Notify your health care provider if you experience any of the following:  persistent nausea and vomiting or diarrhea     Notify your health care provider if you experience any of the following:  severe uncontrolled pain     Notify your health care provider if you experience any of the following:  redness, tenderness, or signs of infection (pain, swelling, redness, odor or green/yellow discharge around incision site)     Notify your health care provider if you experience any of the following:  difficulty breathing or increased cough     Notify your health care provider if you experience any of the following:  severe persistent headache     Notify your health care provider if you experience any of the following:  worsening rash     Notify your health care provider if you experience any of the following:  persistent dizziness, light-headedness, or visual disturbances     Notify your health care provider if you experience any of the following:  increased confusion or weakness     Notify  your health care provider if you experience any of the following:   Order Comments: Vaginal bleeding greater than 2 pads per 1 hour for 2 consecutive hours     Activity as tolerated     Medications:  Current Discharge Medication List      CONTINUE these medications which have NOT CHANGED    Details   blood sugar diagnostic (CONTOUR TEST STRIPS) Strp 1 strip by Misc.(Non-Drug; Combo Route) route 4 (four) times daily.  Qty: 100 strip, Refills: 3    Comments: Please dispense strips covered by pt's insurance      blood-glucose meter (ONETOUCH VERIO METER) Misc Use as directed to check blood sugar 4 times a day  Qty: 1 each, Refills: 0    Comments: Please dispense glucometer covered by pt's insurance      citalopram (CELEXA) 20 MG tablet TK 1 T PO QD  Refills: 3      FEROSUL 325 mg (65 mg iron) Tab tablet Take 1 tablet by mouth 2 (two) times daily.      lancets (ONETOUCH DELICA PLUS LANCET) 33 gauge Misc 1 lancet by Misc.(Non-Drug; Combo Route) route 3 (three) times daily.  Qty: 100 each, Refills: 3    Comments: Please dispense lancets covered by pt's insurance      metFORMIN (GLUCOPHAGE-XR) 500 MG ER 24hr tablet Take 1 tablet (500 mg total) by mouth daily with dinner or evening meal. for 60 doses  Qty: 60 tablet, Refills: 0      ondansetron (ZOFRAN) 8 MG tablet Take 1 tablet (8 mg total) by mouth every 12 (twelve) hours as needed for Nausea.  Qty: 30 tablet, Refills: 1    Associated Diagnoses: Nausea and vomiting in pregnancy      prenat.vits,tyrone,min-iron-folic (PRENATAL VITAMIN) Tab Take 1 tablet by mouth once daily.  Qty: 30 each, Refills: 11    Associated Diagnoses: Pregnancy, unspecified gestational age      sertraline (ZOLOFT) 50 MG tablet Take 50 mg by mouth once daily.             Keya Baca MD  Obstetrics  [facility] ([place])

## 2022-07-19 NOTE — PROGRESS NOTES
Latest Reference Range & Units 07/19/22 11:41   POCT Glucose 70 - 110 mg/dL 155 (H)   (H): Data is abnormally high    Dr. Baca notified. No orders given.

## 2022-07-19 NOTE — HPI
Dipti Villafana is a 37 y.o. [id number] at 33w5d presents complaining of vaginal bleeding x 1 hour.     She reports she stood up from lying down and felt cold fluid. She went to the bathroom and noticed hiram blood that soaked her underwear.      This IUP is complicated by low-lying placenta without previous bleeding (18mm from cervical os), GDMA2(on metformin), MO.  Patient admits to some cramping when the bleeding started but since has not felt anymore.     While in the OB ED, patient confirmed to have blood in vaginal vault & decision was made to admit for sentinel bleed.

## 2022-07-19 NOTE — ASSESSMENT & PLAN NOTE
- Patient admitted in the setting of sentinel bleeding. On admission, no acute bleeding noted. Overnight, patient reports no additional bleeding.   - Last scan 07/11 with placental edge 18mm from cervical os (posterior), no evidence of vasa previa

## 2022-07-19 NOTE — H&P
[facility] ([place])  Obstetrics  History & Physical    Patient Name: Dipti Villafana  MRN: [id number]  Admission Date: 2022  Primary Care Provider: Primary Doctor No    Subjective:     Principal Problem:Vaginal bleeding    History of Present Illness:  Dipti Villafana is a 37 y.o. [id number] at 33w5d presents complaining of vaginal bleeding x 1 hour.     She reports she stood up from lying down and felt cold fluid. She went to the bathroom and noticed hiram blood that soaked her underwear.      This IUP is complicated by low-lying placenta without previous bleeding (18mm from cervical os), GDMA2(on metformin), MO.  Patient admits to some cramping when the bleeding started but since has not felt anymore.     While in the OB ED, patient confirmed to have blood in vaginal vault & decision was made to admit for sentinel bleed.       Obstetric HPI:  Patient reports None contractions, active fetal movement, Yes vaginal bleeding , No loss of fluid       OB History    Para Term  AB Living   3 2 2 0 0 3   SAB IAB Ectopic Multiple Live Births   0 0 0 1 3      # Outcome Date GA Lbr Jaspreet/2nd Weight Sex Delivery Anes PTL Lv   3 Current            2 Term 12   2.722 kg (6 lb) M Vag-Spont   JUAN CARLOS   1A Term 03/12/10    M    JUAN CARLOS      Birth Comments: twin   1B Term 03/12/10    M Vag-Spont  N JUAN CARLOS      Birth Comments: twin     Past Medical History:   Diagnosis Date    Anemia     Gestational diabetes mellitus (GDM) affecting pregnancy 2022     History reviewed. No pertinent surgical history.    PTA Medications   Medication Sig    blood sugar diagnostic (CONTOUR TEST STRIPS) Strp 1 strip by Misc.(Non-Drug; Combo Route) route 4 (four) times daily.    blood-glucose meter (ONETOUCH VERIO METER) Misc Use as directed to check blood sugar 4 times a day    citalopram (CELEXA) 20 MG tablet TK 1 T PO QD    FEROSUL 325 mg (65 mg iron) Tab tablet Take 1 tablet by mouth 2 (two) times daily.    lancets (ONETOUCH  DELICA PLUS LANCET) 33 gauge Misc 1 lancet by Misc.(Non-Drug; Combo Route) route 3 (three) times daily.    metFORMIN (GLUCOPHAGE-XR) 500 MG ER 24hr tablet Take 1 tablet (500 mg total) by mouth daily with dinner or evening meal. for 60 doses    ondansetron (ZOFRAN) 8 MG tablet Take 1 tablet (8 mg total) by mouth every 12 (twelve) hours as needed for Nausea. (Patient not taking: No sig reported)    prenat.vits,tyrone,min-iron-folic (PRENATAL VITAMIN) Tab Take 1 tablet by mouth once daily.    sertraline (ZOLOFT) 50 MG tablet Take 50 mg by mouth once daily.       Review of patient's allergies indicates:  No Known Allergies     Family History       Problem Relation (Age of Onset)    Diabetes Father, Mother    Heart failure Sister    Hypertension Mother          Tobacco Use    Smoking status: Never Smoker    Smokeless tobacco: Never Used   Substance and Sexual Activity    Alcohol use: Not Currently     Comment: occasional    Drug use: No    Sexual activity: Not Currently     Partners: Male     Birth control/protection: None     Comment: Mirena 2012     Review of Systems   Gastrointestinal:  Negative for abdominal pain.   Genitourinary:  Positive for vaginal bleeding. Negative for dysuria, menorrhagia and pelvic pain.    Objective:     Vital Signs (Most Recent):  Temp: 98.1 °F (36.7 °C) (07/18/22 2000)  Pulse: 108 (07/18/22 2000)  Resp: 16 (07/18/22 2000)  BP: 113/65 (07/18/22 2000)  SpO2: 99 % (07/18/22 2000) Vital Signs (24h Range):  Temp:  [98.1 °F (36.7 °C)-98.3 °F (36.8 °C)] 98.1 °F (36.7 °C)  Pulse:  [107-108] 108  Resp:  [16-18] 16  SpO2:  [97 %-99 %] 99 %  BP: (113-118)/(65-76) 113/65     Weight: 98.3 kg (216 lb 11.4 oz)  Body mass index is 42.32 kg/m².    FHT: Baseline 135 Cat 1 (reassuring)  TOCO:  irritability    Physical Exam:   Constitutional: She appears well-developed and well-nourished.     Eyes: EOM are normal.     Cardiovascular:  Normal rate.             Pulmonary/Chest: Effort normal.         Abdominal: Soft.     Genitourinary:    Genitourinary Comments: Small amount of bright red blood in vaginal vault - no active bleeding from os. Visually closed.              Musculoskeletal: Normal range of motion.       Neurological: She is alert.    Skin: Skin is warm.    Psychiatric: She has a normal mood and affect.     Cervix: Deferred     Significant Labs:  Lab Results   Component Value Date    GROUPTRH O POS 2022    HEPBSAG Negative 2022     Assessment/Plan:     37 y.o. female  at 33w6d for:    * Vaginal bleeding  - [place] bleed in setting of low lying placenta  - Admit for observation & pad counts  - 2u pRBC held on admission; high risk for PPH  - MVP 6cm, no concern for PPROM  - Strict pad counts    33 weeks gestation of pregnancy  - Start BMZ course  - Continuous monitoring    Unwanted fertility  - Desires BTL at time of   - Medicaid consents signed   - [facility]  + tubal consents signed     Low-lying placenta  - Last scan  with placental edge 18mm from cervical os  - Admit in setting of sentinel bleed    Obesity affecting pregnancy, antepartum  - Pre-pregnancy BMI 41  - Will need postpartum lovenox BID  - Consider Lovenox antepartum if bleeding stabilizes    Diet controlled gestational diabetes mellitus (GDM), antepartum  - On Metformin 500 mg nightly; will hold while inpatient  - Blood glucoses q 6 hours while NPO w/ SSI ordered  - When no longer NPO, will transition to fasting and 3x daily after meals with SSI    AMA (advanced maternal age) multigravida 35+  - Negative sequential screen    Depressive disorder  - Continue home Celexa & Zoloft  - Consider postpartum mood check         Jenelle Barahona MD  Obstetrics  [facility] - [facility] ([place])

## 2022-07-19 NOTE — PROGRESS NOTES
[facility] - [facility]te[facility] ([place])  Obstetrics  Antepartum Progress Note    Patient Name: Dipti Villafana  MRN: [id number]  Admission Date: 2022  Hospital Length of Stay: 0 days  Attending Physician: SRINIVASAN Pate MD  Primary Care Provider: Primary Doctor No    Subjective:     Principal Problem:Vaginal bleeding    HPI:  Dipti Villafana is a 37 y.o. [id number] at 33w5d presents complaining of vaginal bleeding x 1 hour.     She reports she stood up from lying down and felt cold fluid. She went to the bathroom and noticed hiram blood that soaked her underwear.      This IUP is complicated by low-lying placenta without previous bleeding (18mm from cervical os), GDMA2(on metformin), MO.  Patient admits to some cramping when the bleeding started but since has not felt anymore.     While in the OB ED, patient confirmed to have blood in vaginal vault & decision was made to admit for sentinel bleed.     Hospital Course:  2022: HD#1. Admit in setting of vaginal bleeding & low lying placenta. Start BMZ course. Strict pad counts.   2022: HD#2. NAEON. No obstetric complaints, including no bleeding since admission. CTX visualized on TOCO, patient denies CTX. BMZ -.       Obstetric HPI:  Patient reports no contractions, active fetal movement, no vaginal bleeding , no loss of fluid     OB History    Para Term  AB Living   3 2 2 0 0 3   SAB IAB Ectopic Multiple Live Births   0 0 0 1 3      # Outcome Date GA Lbr Jaspreet/2nd Weight Sex Delivery Anes PTL Lv   3 Current            2 Term 12   2.722 kg (6 lb) M Vag-Spont   JUAN CARLOS   1A Term 03/12/10    M    JUAN CARLOS      Birth Comments: twin   1B Term 03/12/10    M Vag-Spont  N JUAN CARLOS      Birth Comments: twin     Past Medical History:   Diagnosis Date    Anemia     Gestational diabetes mellitus (GDM) affecting pregnancy 2022     History reviewed. No pertinent surgical history.    PTA Medications   Medication Sig    blood sugar diagnostic (CONTOUR TEST STRIPS)  Strp 1 strip by Misc.(Non-Drug; Combo Route) route 4 (four) times daily.    blood-glucose meter (ONETOUCH VERIO METER) Misc Use as directed to check blood sugar 4 times a day    citalopram (CELEXA) 20 MG tablet TK 1 T PO QD    FEROSUL 325 mg (65 mg iron) Tab tablet Take 1 tablet by mouth 2 (two) times daily.    lancets (ONETOUCH DELICA PLUS LANCET) 33 gauge Misc 1 lancet by Misc.(Non-Drug; Combo Route) route 3 (three) times daily.    metFORMIN (GLUCOPHAGE-XR) 500 MG ER 24hr tablet Take 1 tablet (500 mg total) by mouth daily with dinner or evening meal. for 60 doses    ondansetron (ZOFRAN) 8 MG tablet Take 1 tablet (8 mg total) by mouth every 12 (twelve) hours as needed for Nausea. (Patient not taking: No sig reported)    prenat.vits,tyrone,min-iron-folic (PRENATAL VITAMIN) Tab Take 1 tablet by mouth once daily.    sertraline (ZOLOFT) 50 MG tablet Take 50 mg by mouth once daily.       Review of patient's allergies indicates:  No Known Allergies     Family History       Problem Relation (Age of Onset)    Diabetes Father, Mother    Heart failure Sister    Hypertension Mother          Tobacco Use    Smoking status: Never Smoker    Smokeless tobacco: Never Used   Substance and Sexual Activity    Alcohol use: Not Currently     Comment: occasional    Drug use: No    Sexual activity: Not Currently     Partners: Male     Birth control/protection: None     Comment: Mirena 2012     Review of Systems   Gastrointestinal:  Negative for abdominal pain.   Genitourinary:  Negative for dysuria, menorrhagia, pelvic pain and vaginal bleeding.    Objective:     Vital Signs (Most Recent):  Temp: 98 °F (36.7 °C) (07/19/22 0531)  Pulse: 90 (07/19/22 0531)  Resp: 18 (07/19/22 0531)  BP: (!) 97/51 (07/19/22 0531)  SpO2: 96 % (07/19/22 0531) Vital Signs (24h Range):  Temp:  [97.5 °F (36.4 °C)-98.3 °F (36.8 °C)] 98 °F (36.7 °C)  Pulse:  [] 90  Resp:  [16-18] 18  SpO2:  [96 %-99 %] 96 %  BP: ()/(51-76) 97/51     Weight: 98.3 kg (216 lb  11.4 oz)  Body mass index is 42.32 kg/m².    FHT: 125 bpm, mod variability, + accels, - decels; reassuring and reactive   TOCO:  7-10 minutes; patient not feeling     Physical Exam:   Constitutional: She appears well-developed and well-nourished.     Eyes: EOM are normal.     Cardiovascular:  Normal rate.             Pulmonary/Chest: Effort normal.        Abdominal: Soft.     Genitourinary:    Genitourinary Comments: On admission, small amount of bright red blood in vaginal vault- no active bleeding from os. Visually closed.              Musculoskeletal: Normal range of motion.       Neurological: She is alert.    Skin: Skin is warm.    Psychiatric: She has a normal mood and affect.     Cervix: Deferred     Significant Labs:  Lab Results   Component Value Date    GROUPTRH O POS 2022    HEPBSAG Negative 2022     Blood Sugars (AccuCheck):  Recent Labs     22  2336 22  0547   POCTGLUCOSE 106 119*     Assessment/Plan:     37 y.o. female  at 33w6d for:    * Vaginal bleeding  - [place] bleed in setting of low lying placenta  - Admit for observation & pad counts  - 2u pRBC held on admission; high risk for PPH  - MVP 6cm, no concern for PPROM  - strict pad counts    33 weeks gestation of pregnancy  - BMZ -   - Continue Fe and PNV   - Continuous monitoring    Unwanted fertility  - Desires BTL at time of   - Medicaid consents signed   - [facility]  + tubal consents signed     Low-lying placenta  - Patient admitted in the setting of sentinel bleeding. On admission, no acute bleeding noted. Overnight, patient reports no additional bleeding.   - Last scan  with placental edge 18mm from cervical os (posterior), no evidence of vasa previa     Obesity affecting pregnancy, antepartum  - Pre-pregnancy BMI 41  - Will need postpartum lovenox BID  - Consider Lovenox antepartum if bleeding stabilizes    Diet controlled gestational diabetes mellitus (GDM), antepartum  - On  Metformin 500 mg nightly; will hold while inpatient  - Blood glucoses q 6 hours while NPO w/ SSI ordered  - When no longer NPO, will transition to fasting and 3x daily after meals with SSI    AMA (advanced maternal age) multigravida 35+  - Negative sequential screen    Depressive disorder  - Continue home Celexa & Zoloft  - Will need 1-2 week postpartum follow-up     Keya Baca MD  Obstetrics  [facility] - Antepartum ([place])    I have reviewed and agree with progress note, assessment and plan as written by Dr. Baca. Patient is a 37 y.o.  at 33w6d admitted to [facility] service for third trimester vaginal bleeding in the setting of low lying placenta. Patient seen at bedside this am. No bleeding since admission. States she had a gush of bright red blood while cooking yesterday. Denies recent intercourse, further bleeding or leakage of watery fluid. Good FM. Denies ctx pain despite ctx on the monitor.     Temp:  [97.5 °F (36.4 °C)-98.3 °F (36.8 °C)] 98 °F (36.7 °C)  Pulse:  [] 90  Resp:  [16-18] 18  SpO2:  [96 %-99 %] 96 %  BP: ()/(51-76) 97/51    NST: baseline 130, + accels, no decels  [place]: ctx q 3-5 min    1. Low lying placenta/3rd trimester bleeding  -no further vaginal bleeding since admission  -differential for vaginal bleeding is from low lying placenta, cervical change due to contractions, ROM.   -plan for repeat exam this am to assess cervical dilation and for potential ROM  -ARLETTE to confirm normal AF  -BMZ dose 2 due this pm  -will continue to monitor inpatient until BMZ series completed, if no further bleeding and normal ROM exam will plan for likely discharge this pm    Randall Gutierrez MD  PGY 6  Maternal Fetal Medicine  [facility]

## 2022-07-19 NOTE — ASSESSMENT & PLAN NOTE
- [place] bleed in setting of low lying placenta  - No bleeding since admitted to hospital.   - 2u pRBC held on admission; high risk for PPH  - MVP 6cm, repeat 4.17 cm, no concern for PPROM  - SSE: consistent with closed cervix no evidence of rupture

## 2022-07-19 NOTE — ED PROVIDER NOTES
Encounter Date: 2022       History     Chief Complaint   Patient presents with    Vaginal Bleeding     HPI     Dipti Villafana is a 37 y.o. [id number] at 33w5d presents complaining of vaginal bleeding that started an hour ago.     She reports she stood up from lying down and felt cold fluid. She went to the bathroom and noticed hiram blood that soaked her underwear.     This IUP is complicated by low-lying placenta without previous bleeding (18mm from cervical os, noted on US 22), A2GDM (on metformin).  Patient denies contractions, reports vaginal bleeding, denies LOF.   Fetal Movement: normal.     Review of patient's allergies indicates:  No Known Allergies  Past Medical History:   Diagnosis Date    Anemia     Gestational diabetes mellitus (GDM) affecting pregnancy 2022     History reviewed. No pertinent surgical history.  Family History   Problem Relation Age of Onset    Diabetes Father     Hypertension Mother     Diabetes Mother     Heart failure Sister     Breast cancer Neg Hx     Colon cancer Neg Hx     Ovarian cancer Neg Hx      Social History     Tobacco Use    Smoking status: Never Smoker    Smokeless tobacco: Never Used   Substance Use Topics    Alcohol use: Not Currently     Comment: occasional    Drug use: No     Review of Systems   Constitutional: Negative for chills and fever.   HENT: Negative for sore throat.    Eyes: Negative for visual disturbance.   Respiratory: Negative for shortness of breath.    Cardiovascular: Negative for chest pain.   Gastrointestinal: Negative for abdominal pain, nausea and vomiting.   Genitourinary: Positive for vaginal bleeding. Negative for dysuria and vaginal discharge.   Musculoskeletal: Negative for back pain.   Skin: Negative for rash.   Neurological: Negative for weakness and headaches.   Hematological: Does not bruise/bleed easily.   Psychiatric/Behavioral: The patient is not nervous/anxious.        Physical Exam     Initial Vitals   BP  Pulse Resp Temp SpO2   07/18/22 1900 07/18/22 1900 07/18/22 1848 07/18/22 1848 07/18/22 1900   118/76 107 18 98.3 °F (36.8 °C) 97 %      MAP       --                Physical Exam    Vitals reviewed.  Constitutional: She appears well-developed.   HENT:   Head: Normocephalic.   Eyes: EOM are normal.   Neck:   Normal range of motion.  Cardiovascular: Normal rate.   Pulmonary/Chest: No respiratory distress.   Abdominal: Abdomen is soft. There is no abdominal tenderness.   Musculoskeletal:      Cervical back: Normal range of motion.     Neurological: She is alert and oriented to person, place, and time.   Skin: Skin is warm. Capillary refill takes less than 2 seconds.   Psychiatric: Her behavior is normal.     OB LABOR EXAM:       Method: Sterile speculum exam per MD.   Vaginal Bleeding: bright red and clots.               Comments: SVE deferred       ED Course   Fetal non-stress test    Date/Time: 7/18/2022 9:18 PM  Performed by: Dorina Jimenez MD  Authorized by: Dorina Jimenez MD     Nonstress Test:     Variability:  6-25 BPM    Decelerations:  None    Accelerations:  15 bpm    Baseline:  150    Contractions:  Irregular  Biophysical Profile:     MVP (Maximal Vertical Pocket):  6    Nonstress Test Interpretation: reactive      Overall Impression:  Reassuring  Post-procedure:     Patient tolerance:  Patient tolerated the procedure well with no immediate complications      Labs Reviewed   CBC W/ AUTO DIFFERENTIAL - Abnormal; Notable for the following components:       Result Value    Hemoglobin 11.1 (*)     Hematocrit 35.1 (*)     MCV 79 (*)     MCH 25.1 (*)     MCHC 31.6 (*)     RDW 15.0 (*)     MPV 9.0 (*)     All other components within normal limits   STREP B SCREEN, VAGINAL / RECTAL   STREP B SCREEN, VAGINAL / RECTAL   HIV 1 / 2 ANTIBODY    Narrative:     Release to patient->Immediate   HEMOGLOBIN A1C   RPR   TYPE & SCREEN   POCT GLUCOSE MONITORING CONTINUOUS   PREPARE RBC SOFT          Imaging Results    None           Medications   citalopram tablet 20 mg (has no administration in time range)   sertraline tablet 50 mg (has no administration in time range)   sodium chloride 0.9% flush 10 mL (has no administration in time range)   acetaminophen tablet 650 mg (has no administration in time range)   ondansetron disintegrating tablet 8 mg (has no administration in time range)   prochlorperazine injection Soln 5 mg (has no administration in time range)   senna-docusate 8.6-50 mg per tablet 1 tablet (has no administration in time range)   simethicone chewable tablet 80 mg (has no administration in time range)   diphenhydrAMINE capsule 25 mg (has no administration in time range)   diphenhydrAMINE injection 25 mg (has no administration in time range)   prenatal vitamin oral tablet (has no administration in time range)   betamethasone acetate-betamethasone sodium phosphate injection 12 mg (12 mg Intramuscular Given 7/18/22 2025)   dextrose 10% bolus 125 mL (has no administration in time range)   glucagon (human recombinant) injection 1 mg (has no administration in time range)   insulin aspart U-100 pen 1-10 Units (has no administration in time range)   iron polysaccharides capsule 150 mg (has no administration in time range)   lactated ringers bolus 500 mL (has no administration in time range)   lactated ringers infusion (has no administration in time range)   ondansetron tablet 4 mg (4 mg Oral Given 7/18/22 2025)     Medical Decision Making:   ED Management:  - VSS  - NST: baseline 150, + mod variability, + accels, - decels; Cat 1, RR  - TOCO: irregular contractions  - SVE: deferred due to vaginal bleeding and history of low-lying placenta  - SSE: hiram blood, cervical os closed upon visualization  - CBC: h/h 11/35    - Consents signed, GBS swab taken  - Plan to admit patient for further observation            Attending Attestation:   Physician Attestation Statement for Resident:  As the supervising MD   Physician Attestation Statement:  I have personally seen and examined this patient.   I agree with the above history. -:   As the supervising MD I agree with the above PE.    As the supervising MD I agree with the above treatment, course, plan, and disposition.   -:   NST  I independently reviewed the fetal non-stress test with the following interpretation:  150 BPM baseline  Variability: moderate  Accelerations: present  Decelerations: absent  Contractions: Q 5 min  Category 1    Clinical Interpretation:reactive    Patient evaluated and found to be stable, agree with resident's assessment and plan.  With low lying placenta nad bleeding episode, planned admission to [facility] for BMZ and continued inpatient monitoring for bleeding.    I was personally present during the critical portions of the procedure(s) performed by the resident and was immediately available in the ED to provide services and assistance as needed during the entire procedure.                         Clinical Impression:   Final diagnoses:  [N93.9] Vaginal bleeding (Primary)  [O44.40] Low-lying placenta  [Z3A.33] 33 weeks gestation of pregnancy          ED Disposition Condition    Observation               Dorina Jimenez MD  PGY-1  Obstetrics & Gynecology      Dorina Jimenez MD  Resident  07/18/22 5727       Patsy Brice MD  07/19/22 2505

## 2022-07-19 NOTE — ASSESSMENT & PLAN NOTE
- On Metformin 500 mg nightly, will continue at home   - Continue glucose monitoring fasting and 3x daily after meals  - Instructed to send blood sugar logs to portal for the next week while s/p TERRY. .

## 2022-07-19 NOTE — ASSESSMENT & PLAN NOTE
- On Metformin 500 mg nightly; will hold while inpatient  - Blood glucoses q 6 hours while NPO w/ SSI ordered  - When no longer NPO, will transition to fasting and 3x daily after meals with SSI

## 2022-07-19 NOTE — SUBJECTIVE & OBJECTIVE
Obstetric HPI:  Patient reports None contractions, active fetal movement, Yes vaginal bleeding , No loss of fluid       OB History    Para Term  AB Living   3 2 2 0 0 3   SAB IAB Ectopic Multiple Live Births   0 0 0 1 3      # Outcome Date GA Lbr Jaspreet/2nd Weight Sex Delivery Anes PTL Lv   3 Current            2 Term 12   2.722 kg (6 lb) M Vag-Spont   JUAN CARLOS   1A Term 03/12/10    M    JUAN CARLOS      Birth Comments: twin   1B Term 03/12/10    M Vag-Spont  N JUAN CARLOS      Birth Comments: twin     Past Medical History:   Diagnosis Date    Anemia     Gestational diabetes mellitus (GDM) affecting pregnancy 2022     History reviewed. No pertinent surgical history.    PTA Medications   Medication Sig    blood sugar diagnostic (CONTOUR TEST STRIPS) Strp 1 strip by Misc.(Non-Drug; Combo Route) route 4 (four) times daily.    blood-glucose meter (ONETOUCH VERIO METER) Misc Use as directed to check blood sugar 4 times a day    citalopram (CELEXA) 20 MG tablet TK 1 T PO QD    FEROSUL 325 mg (65 mg iron) Tab tablet Take 1 tablet by mouth 2 (two) times daily.    lancets (ONETOUCH DELICA PLUS LANCET) 33 gauge Misc 1 lancet by Misc.(Non-Drug; Combo Route) route 3 (three) times daily.    metFORMIN (GLUCOPHAGE-XR) 500 MG ER 24hr tablet Take 1 tablet (500 mg total) by mouth daily with dinner or evening meal. for 60 doses    ondansetron (ZOFRAN) 8 MG tablet Take 1 tablet (8 mg total) by mouth every 12 (twelve) hours as needed for Nausea. (Patient not taking: No sig reported)    prenat.vits,tyrone,min-iron-folic (PRENATAL VITAMIN) Tab Take 1 tablet by mouth once daily.    sertraline (ZOLOFT) 50 MG tablet Take 50 mg by mouth once daily.       Review of patient's allergies indicates:  No Known Allergies     Family History       Problem Relation (Age of Onset)    Diabetes Father, Mother    Heart failure Sister    Hypertension Mother          Tobacco Use    Smoking status: Never Smoker    Smokeless tobacco: Never Used   Substance and  Sexual Activity    Alcohol use: Not Currently     Comment: occasional    Drug use: No    Sexual activity: Not Currently     Partners: Male     Birth control/protection: None     Comment: Mirena 2012     Review of Systems   Gastrointestinal:  Negative for abdominal pain.   Genitourinary:  Positive for vaginal bleeding. Negative for dysuria, menorrhagia and pelvic pain.    Objective:     Vital Signs (Most Recent):  Temp: 98.1 °F (36.7 °C) (07/18/22 2000)  Pulse: 108 (07/18/22 2000)  Resp: 16 (07/18/22 2000)  BP: 113/65 (07/18/22 2000)  SpO2: 99 % (07/18/22 2000) Vital Signs (24h Range):  Temp:  [98.1 °F (36.7 °C)-98.3 °F (36.8 °C)] 98.1 °F (36.7 °C)  Pulse:  [107-108] 108  Resp:  [16-18] 16  SpO2:  [97 %-99 %] 99 %  BP: (113-118)/(65-76) 113/65     Weight: 98.3 kg (216 lb 11.4 oz)  Body mass index is 42.32 kg/m².    FHT: Baseline 135 Cat 1 (reassuring)  TOCO:  irritability    Physical Exam:   Constitutional: She appears well-developed and well-nourished.     Eyes: EOM are normal.     Cardiovascular:  Normal rate.             Pulmonary/Chest: Effort normal.        Abdominal: Soft.     Genitourinary:    Genitourinary Comments: Small amount of bright red blood in vaginal vault - no active bleeding from os. Visually closed.              Musculoskeletal: Normal range of motion.       Neurological: She is alert.    Skin: Skin is warm.    Psychiatric: She has a normal mood and affect.     Cervix: Deferred     Significant Labs:  Lab Results   Component Value Date    GROUPTRH O POS 07/18/2022    HEPBSAG Negative 01/21/2022

## 2022-07-20 LAB — BACTERIA UR CULT: NO GROWTH

## 2022-07-21 LAB — BACTERIA SPEC AEROBE CULT: NORMAL

## 2022-07-25 ENCOUNTER — ROUTINE PRENATAL (OUTPATIENT)
Dept: OBSTETRICS AND GYNECOLOGY | Facility: CLINIC | Age: 37
End: 2022-07-25
Payer: MEDICAID

## 2022-07-25 ENCOUNTER — HOSPITAL ENCOUNTER (OUTPATIENT)
Dept: PERINATAL CARE | Facility: OTHER | Age: 37
Discharge: HOME | End: 2022-07-25
Attending: OBSTETRICS & GYNECOLOGY
Payer: MEDICAID

## 2022-07-25 VITALS — BODY MASS INDEX: 42.28 KG/M2 | WEIGHT: 216.5 LBS | SYSTOLIC BLOOD PRESSURE: 120 MMHG | DIASTOLIC BLOOD PRESSURE: 70 MMHG

## 2022-07-25 DIAGNOSIS — O24.415 GESTATIONAL DIABETES MELLITUS (GDM) IN THIRD TRIMESTER CONTROLLED ON ORAL HYPOGLYCEMIC DRUG: ICD-10-CM

## 2022-07-25 DIAGNOSIS — O09.523 MULTIGRAVIDA OF ADVANCED MATERNAL AGE IN THIRD TRIMESTER: Primary | ICD-10-CM

## 2022-07-25 DIAGNOSIS — O24.419 GESTATIONAL DIABETES MELLITUS (GDM) IN THIRD TRIMESTER, GESTATIONAL DIABETES METHOD OF CONTROL UNSPECIFIED: ICD-10-CM

## 2022-07-25 DIAGNOSIS — O99.213 OBESITY AFFECTING PREGNANCY IN THIRD TRIMESTER: ICD-10-CM

## 2022-07-25 DIAGNOSIS — O44.40 LOW-LYING PLACENTA: ICD-10-CM

## 2022-07-25 PROCEDURE — 76815 OB US LIMITED FETUS(S): CPT | Mod: 26,,, | Performed by: OBSTETRICS & GYNECOLOGY

## 2022-07-25 PROCEDURE — 99999 PR PBB SHADOW E&M-EST. PATIENT-LVL II: CPT | Mod: PBBFAC,,, | Performed by: OBSTETRICS & GYNECOLOGY

## 2022-07-25 PROCEDURE — 59025 FETAL NON-STRESS TEST: CPT

## 2022-07-25 PROCEDURE — 59025 FETAL NON-STRESS TEST: CPT | Mod: 26,,, | Performed by: OBSTETRICS & GYNECOLOGY

## 2022-07-25 PROCEDURE — 99214 OFFICE O/P EST MOD 30 MIN: CPT | Mod: TH,S$PBB,25, | Performed by: OBSTETRICS & GYNECOLOGY

## 2022-07-25 PROCEDURE — 76815 PRENATAL TESTING - NST/AFI: ICD-10-PCS | Mod: 26,,, | Performed by: OBSTETRICS & GYNECOLOGY

## 2022-07-25 PROCEDURE — 99214 PR OFFICE/OUTPT VISIT, EST, LEVL IV, 30-39 MIN: ICD-10-PCS | Mod: TH,S$PBB,25, | Performed by: OBSTETRICS & GYNECOLOGY

## 2022-07-25 PROCEDURE — 99999 PR PBB SHADOW E&M-EST. PATIENT-LVL II: ICD-10-PCS | Mod: PBBFAC,,, | Performed by: OBSTETRICS & GYNECOLOGY

## 2022-07-25 PROCEDURE — 76815 OB US LIMITED FETUS(S): CPT

## 2022-07-25 PROCEDURE — 59025 PRENATAL TESTING - NST/AFI: ICD-10-PCS | Mod: 26,,, | Performed by: OBSTETRICS & GYNECOLOGY

## 2022-07-25 PROCEDURE — 99212 OFFICE O/P EST SF 10 MIN: CPT | Mod: PBBFAC,25,TH,PN | Performed by: OBSTETRICS & GYNECOLOGY

## 2022-07-25 NOTE — PROGRESS NOTES
Pregnancy dating, labs, ultrasound reports, prenatal testing, and problem list; prior records and results; and available outside records were reviewed and updated in EMR.  Pertinent findings were noted below.    Reason for Visit   Routine Prenatal Visit    HPI   37 y.o., at 34w5d by Estimated Date of Delivery: 22    Patient went to [facility]  for vaginal bleeding. Discharged after one day when cervix remained unchanged, bleeding stopped and no evidence of rupture. Patient denies any vaginal bleeding since then.     Contractions: No   Bleeding: No   Loss of fluid: No   Fetal movement: Yes   Nausea: No   Vomiting: No   Headache: No     Exam   /70   Wt 98.2 kg (216 lb 7.9 oz)   LMP 2021   BMI 42.28 kg/m²     GENERAL: No acute distress  ABD: Gravid    Assessment and Plan   Multigravida of advanced maternal age in third trimester    Obesity affecting pregnancy in third trimester    Gestational diabetes mellitus (GDM) in third trimester controlled on oral hypoglycemic drug    Low-lying placenta        Multigravida of advanced maternal age in third trimester  - Labs reviewed and up to date    Obesity affecting pregnancy in third trimester  - Repeat u/s for growth scheduled    Gestational diabetes mellitus (GDM) affecting pregnancy  -On Metformin 500mg nightly  -Home glucose log:   -Fasting: () 4/9 elevated   -2 hr after breakfast: () 1/9 elevated   -2 hr after lunch: (109-134) 2/9 elevated   -2 hr after supper: (115-203) 2/7 elevated  - PNT scheduled    Low-lying placenta  -Unresolved as of US on   -Repeat US scheduled for 8/3       labor precautions given  Follow-up: 1 week     Michell Cordero MD  [facility]   OBGYN PGY1    I have reviewed the Resident's progress note.  I have personally interviewed and examined the patient at bedside and agree with the findings as documented.  All patient questions answered.  -- SRINIVASAN Pate M.D.

## 2022-08-02 ENCOUNTER — PATIENT MESSAGE (OUTPATIENT)
Dept: MATERNAL FETAL MEDICINE | Facility: CLINIC | Age: 37
End: 2022-08-02
Payer: MEDICAID

## 2022-08-03 ENCOUNTER — PATIENT MESSAGE (OUTPATIENT)
Dept: MATERNAL FETAL MEDICINE | Facility: CLINIC | Age: 37
End: 2022-08-03

## 2022-08-03 ENCOUNTER — ROUTINE PRENATAL (OUTPATIENT)
Dept: OBSTETRICS AND GYNECOLOGY | Facility: CLINIC | Age: 37
End: 2022-08-03
Payer: MEDICAID

## 2022-08-03 ENCOUNTER — OFFICE VISIT (OUTPATIENT)
Dept: MATERNAL FETAL MEDICINE | Facility: CLINIC | Age: 37
End: 2022-08-03
Attending: OBSTETRICS & GYNECOLOGY
Payer: MEDICAID

## 2022-08-03 ENCOUNTER — PROCEDURE VISIT (OUTPATIENT)
Dept: MATERNAL FETAL MEDICINE | Facility: CLINIC | Age: 37
End: 2022-08-03
Payer: MEDICAID

## 2022-08-03 VITALS — BODY MASS INDEX: 42.5 KG/M2 | SYSTOLIC BLOOD PRESSURE: 122 MMHG | DIASTOLIC BLOOD PRESSURE: 76 MMHG | WEIGHT: 217.63 LBS

## 2022-08-03 VITALS — WEIGHT: 217.63 LBS | SYSTOLIC BLOOD PRESSURE: 120 MMHG | DIASTOLIC BLOOD PRESSURE: 70 MMHG | BODY MASS INDEX: 42.5 KG/M2

## 2022-08-03 DIAGNOSIS — O44.42 LOW-LYING PLACENTA IN SECOND TRIMESTER: ICD-10-CM

## 2022-08-03 DIAGNOSIS — E66.01 MORBID OBESITY WITH BMI OF 40.0-44.9, ADULT: ICD-10-CM

## 2022-08-03 DIAGNOSIS — Z3A.36 36 WEEKS GESTATION OF PREGNANCY: ICD-10-CM

## 2022-08-03 DIAGNOSIS — Z36.4 ANTENATAL SCREENING FOR FETAL GROWTH RETARDATION USING ULTRASONICS: ICD-10-CM

## 2022-08-03 DIAGNOSIS — O09.523 MULTIGRAVIDA OF ADVANCED MATERNAL AGE IN THIRD TRIMESTER: ICD-10-CM

## 2022-08-03 DIAGNOSIS — O24.415 GESTATIONAL DIABETES MELLITUS (GDM) IN THIRD TRIMESTER CONTROLLED ON ORAL HYPOGLYCEMIC DRUG: ICD-10-CM

## 2022-08-03 DIAGNOSIS — O09.93 PREGNANCY, SUPERVISION, HIGH-RISK, THIRD TRIMESTER: Primary | ICD-10-CM

## 2022-08-03 DIAGNOSIS — O24.410 DIET CONTROLLED GESTATIONAL DIABETES MELLITUS (GDM), ANTEPARTUM: ICD-10-CM

## 2022-08-03 DIAGNOSIS — O99.210 OBESITY AFFECTING PREGNANCY, ANTEPARTUM: ICD-10-CM

## 2022-08-03 DIAGNOSIS — Z36.89 ENCOUNTER FOR ULTRASOUND TO ASSESS FETAL GROWTH: ICD-10-CM

## 2022-08-03 DIAGNOSIS — O44.40 LOW-LYING PLACENTA: ICD-10-CM

## 2022-08-03 PROBLEM — O44.43 LOW LYING PLACENTA NOS OR WITHOUT HEMORRHAGE, THIRD TRIMESTER: Status: RESOLVED | Noted: 2022-07-10 | Resolved: 2022-08-03

## 2022-08-03 PROCEDURE — 76819 PR US, OB, FETAL BIOPHYSICAL, W/O NST: ICD-10-PCS | Mod: 26,S$PBB,, | Performed by: OBSTETRICS & GYNECOLOGY

## 2022-08-03 PROCEDURE — 76817 PR US, OB, TRANSVAG APPROACH: ICD-10-PCS | Mod: 26,S$PBB,, | Performed by: OBSTETRICS & GYNECOLOGY

## 2022-08-03 PROCEDURE — 99214 OFFICE O/P EST MOD 30 MIN: CPT | Mod: 25,S$PBB,TH, | Performed by: OBSTETRICS & GYNECOLOGY

## 2022-08-03 PROCEDURE — 3074F PR MOST RECENT SYSTOLIC BLOOD PRESSURE < 130 MM HG: ICD-10-PCS | Mod: CPTII,,, | Performed by: OBSTETRICS & GYNECOLOGY

## 2022-08-03 PROCEDURE — 3078F DIAST BP <80 MM HG: CPT | Mod: CPTII,,, | Performed by: OBSTETRICS & GYNECOLOGY

## 2022-08-03 PROCEDURE — 99213 OFFICE O/P EST LOW 20 MIN: CPT | Mod: PBBFAC,TH,25 | Performed by: OBSTETRICS & GYNECOLOGY

## 2022-08-03 PROCEDURE — 76819 FETAL BIOPHYS PROFIL W/O NST: CPT | Mod: PBBFAC | Performed by: OBSTETRICS & GYNECOLOGY

## 2022-08-03 PROCEDURE — 76816 PR  US,PREGNANT UTERUS,F/U,TRANSABD APP: ICD-10-PCS | Mod: 26,S$PBB,, | Performed by: OBSTETRICS & GYNECOLOGY

## 2022-08-03 PROCEDURE — 76817 TRANSVAGINAL US OBSTETRIC: CPT | Mod: PBBFAC | Performed by: OBSTETRICS & GYNECOLOGY

## 2022-08-03 PROCEDURE — 99999 PR PBB SHADOW E&M-EST. PATIENT-LVL III: ICD-10-PCS | Mod: PBBFAC,,, | Performed by: OBSTETRICS & GYNECOLOGY

## 2022-08-03 PROCEDURE — 76816 OB US FOLLOW-UP PER FETUS: CPT | Mod: PBBFAC | Performed by: OBSTETRICS & GYNECOLOGY

## 2022-08-03 PROCEDURE — 99214 PR OFFICE/OUTPT VISIT, EST, LEVL IV, 30-39 MIN: ICD-10-PCS | Mod: 25,S$PBB,TH, | Performed by: OBSTETRICS & GYNECOLOGY

## 2022-08-03 PROCEDURE — 3008F PR BODY MASS INDEX (BMI) DOCUMENTED: ICD-10-PCS | Mod: CPTII,,, | Performed by: OBSTETRICS & GYNECOLOGY

## 2022-08-03 PROCEDURE — 76816 OB US FOLLOW-UP PER FETUS: CPT | Mod: 26,S$PBB,, | Performed by: OBSTETRICS & GYNECOLOGY

## 2022-08-03 PROCEDURE — 76819 FETAL BIOPHYS PROFIL W/O NST: CPT | Mod: 26,S$PBB,, | Performed by: OBSTETRICS & GYNECOLOGY

## 2022-08-03 PROCEDURE — 99999 PR PBB SHADOW E&M-EST. PATIENT-LVL III: CPT | Mod: PBBFAC,,, | Performed by: ADVANCED PRACTICE MIDWIFE

## 2022-08-03 PROCEDURE — 3008F BODY MASS INDEX DOCD: CPT | Mod: CPTII,,, | Performed by: OBSTETRICS & GYNECOLOGY

## 2022-08-03 PROCEDURE — 99213 PR OFFICE/OUTPT VISIT, EST, LEVL III, 20-29 MIN: ICD-10-PCS | Mod: TH,S$PBB,, | Performed by: ADVANCED PRACTICE MIDWIFE

## 2022-08-03 PROCEDURE — 99999 PR PBB SHADOW E&M-EST. PATIENT-LVL III: CPT | Mod: PBBFAC,,, | Performed by: OBSTETRICS & GYNECOLOGY

## 2022-08-03 PROCEDURE — 99213 OFFICE O/P EST LOW 20 MIN: CPT | Mod: PBBFAC,25,27,TH,PN | Performed by: ADVANCED PRACTICE MIDWIFE

## 2022-08-03 PROCEDURE — 3044F HG A1C LEVEL LT 7.0%: CPT | Mod: CPTII,,, | Performed by: OBSTETRICS & GYNECOLOGY

## 2022-08-03 PROCEDURE — 3044F PR MOST RECENT HEMOGLOBIN A1C LEVEL <7.0%: ICD-10-PCS | Mod: CPTII,,, | Performed by: OBSTETRICS & GYNECOLOGY

## 2022-08-03 PROCEDURE — 76817 TRANSVAGINAL US OBSTETRIC: CPT | Mod: 26,S$PBB,, | Performed by: OBSTETRICS & GYNECOLOGY

## 2022-08-03 PROCEDURE — 3074F SYST BP LT 130 MM HG: CPT | Mod: CPTII,,, | Performed by: OBSTETRICS & GYNECOLOGY

## 2022-08-03 PROCEDURE — 99213 OFFICE O/P EST LOW 20 MIN: CPT | Mod: TH,S$PBB,, | Performed by: ADVANCED PRACTICE MIDWIFE

## 2022-08-03 PROCEDURE — 3078F PR MOST RECENT DIASTOLIC BLOOD PRESSURE < 80 MM HG: ICD-10-PCS | Mod: CPTII,,, | Performed by: OBSTETRICS & GYNECOLOGY

## 2022-08-03 PROCEDURE — 99999 PR PBB SHADOW E&M-EST. PATIENT-LVL III: ICD-10-PCS | Mod: PBBFAC,,, | Performed by: ADVANCED PRACTICE MIDWIFE

## 2022-08-03 NOTE — PROGRESS NOTES
Reason for visit: Routine Prenatal Visit      HPI:   37 y.o., at 36w0d by Estimated Date of Delivery: 22    Pt reported taking her Metformin every night checking blood glucose 4x/day. Fasting between 92-93, 2 hr post-prandial reading between 117-122.    - Contractions: Denies  - Bleeding: Denies  - Loss of fluid: Denies  - Fetal movement: Present  - Nausea: Denies  - Vomiting: Denies  - Headache: Denies      Reviewed:    Past medical, surgical, social, family, and obstetric history: Reviewed and updated in EMR.  Medications: Reviewed and updated in EMR.  Allergies: Patient has no known allergies.    Pregnancy dating, labs, ultrasound reports, prenatal testing, and problem list: Reviewed and updated in EMR.  Outside records: Reviewed  Independent interpretation of tests: Reviewed  Discussion with another healthcare professional: Reviewed      Vitals: /70   Wt 98.7 kg (217 lb 9.5 oz)   LMP 2021   BMI 42.50 kg/m²     Physical exam:  GENERAL: No acute distress  ABD: Gravid      Assessment and Plan:    There are no diagnoses linked to this encounter.     Patient is considered high-risk pregnancy currently due to gestational diabetes with metformin use. Will have weekly appointments with doctors in the clinic. MFM following.     labor precautions given  Follow-up: 1 week      I spent a total of 20 minutes on the day of the visit. This includes face to face time and non-face to face time preparing to see the patient (eg, review of tests), Obtaining and/or reviewing separately obtained history, Documenting clinical information in the electronic or other health record, Independently interpreting results and communicating results to the patient/family/caregiver, or Care coordination.

## 2022-08-03 NOTE — PROGRESS NOTES
Maternal Fetal Medicine follow up consult    SUBJECTIVE:     Dipti Villafana is a 37 y.o.  female with IUP at 36w0d who is seen in follow up consultation by MFM.  Pregnancy complications include:   Problem   Low-lying placenta (details)   Low Lying Placenta NOS Or Without Hemorrhage, Third Trimester (Resolved)     Previous notes reviewed.   No changes to medical, surgical, family, social, or obstetric history.    Interval history since last MFM visit: doing well today without complaints    Medications:  Current Outpatient Medications   Medication Instructions    blood sugar diagnostic (CONTOUR TEST STRIPS) Strp 1 strip, Misc.(Non-Drug; Combo Route), 4 times daily    blood-glucose meter (ONETOUCH VERIO METER) Misc Use as directed to check blood sugar 4 times a day    citalopram (CELEXA) 20 MG tablet TK 1 T PO QD    FEROSUL 325 mg (65 mg iron) Tab tablet 1 tablet, Oral, 2 times daily    lancets (ONETOUCH DELICA PLUS LANCET) 33 gauge Misc 1 lancet, Misc.(Non-Drug; Combo Route), 3 times daily    metFORMIN (GLUCOPHAGE-XR) 500 mg, Oral, With dinner    ondansetron (ZOFRAN) 8 mg, Oral, Every 12 hours PRN    prenat.vits,tyrone,min-iron-folic (PRENATAL VITAMIN) Tab 1 tablet, Oral, Daily    sertraline (ZOLOFT) 50 mg, Oral, Daily        OBJECTIVE:   /76 (BP Location: Left arm, Patient Position: Sitting)   Wt 98.7 kg (217 lb 9.5 oz)   LMP 2021   BMI 42.50 kg/m²     Physical Exam    Ultrasound performed. See viewpoint for full ultrasound report.    ASSESSMENT/PLAN:     37 y.o.  female with IUP at 36w0d    Low-lying placenta (details)  -low lying placenta has resolved  -delivery per obstetric indication    Diet controlled gestational diabetes mellitus (GDM), antepartum  -on metformin 500 qhs  -did not bring glucose log today  -recommend continue to send glucose log through portal for review  -endorses good glucose control  -continue twice weekly testing until delivery      No further MFM follow up  has been scheduled, please contact with any questions or concerns    Randall Gutierrez MD  PGY 6  Maternal Fetal Medicine  [facility]

## 2022-08-03 NOTE — ASSESSMENT & PLAN NOTE
-on metformin 500 qhs  -did not bring glucose log today  -recommend continue to send glucose log through portal for review  -endorses good glucose control  -continue twice weekly testing until delivery

## 2022-08-08 ENCOUNTER — HOSPITAL ENCOUNTER (OUTPATIENT)
Dept: PERINATAL CARE | Facility: OTHER | Age: 37
Discharge: HOME | End: 2022-08-08
Attending: OBSTETRICS & GYNECOLOGY
Payer: MEDICAID

## 2022-08-08 ENCOUNTER — TELEPHONE (OUTPATIENT)
Dept: OBSTETRICS AND GYNECOLOGY | Facility: CLINIC | Age: 37
End: 2022-08-08

## 2022-08-08 ENCOUNTER — ROUTINE PRENATAL (OUTPATIENT)
Dept: OBSTETRICS AND GYNECOLOGY | Facility: CLINIC | Age: 37
End: 2022-08-08
Payer: MEDICAID

## 2022-08-08 ENCOUNTER — PATIENT MESSAGE (OUTPATIENT)
Dept: OBSTETRICS AND GYNECOLOGY | Facility: CLINIC | Age: 37
End: 2022-08-08

## 2022-08-08 VITALS — SYSTOLIC BLOOD PRESSURE: 122 MMHG | WEIGHT: 217.63 LBS | BODY MASS INDEX: 42.5 KG/M2 | DIASTOLIC BLOOD PRESSURE: 60 MMHG

## 2022-08-08 DIAGNOSIS — O24.419 GESTATIONAL DIABETES MELLITUS (GDM) IN THIRD TRIMESTER, GESTATIONAL DIABETES METHOD OF CONTROL UNSPECIFIED: ICD-10-CM

## 2022-08-08 DIAGNOSIS — O22.43 HEMORRHOIDS DURING PREGNANCY IN THIRD TRIMESTER: ICD-10-CM

## 2022-08-08 DIAGNOSIS — N89.8 VAGINAL DISCHARGE: ICD-10-CM

## 2022-08-08 DIAGNOSIS — O24.415 GESTATIONAL DIABETES MELLITUS (GDM) IN THIRD TRIMESTER CONTROLLED ON ORAL HYPOGLYCEMIC DRUG: Primary | ICD-10-CM

## 2022-08-08 PROCEDURE — 76815 OB US LIMITED FETUS(S): CPT

## 2022-08-08 PROCEDURE — 59025 PRENATAL TESTING - NST/AFI: ICD-10-PCS | Mod: 26,,, | Performed by: OBSTETRICS & GYNECOLOGY

## 2022-08-08 PROCEDURE — 99214 OFFICE O/P EST MOD 30 MIN: CPT | Mod: 25,TH,S$PBB, | Performed by: OBSTETRICS & GYNECOLOGY

## 2022-08-08 PROCEDURE — 99999 PR PBB SHADOW E&M-EST. PATIENT-LVL III: CPT | Mod: PBBFAC,,, | Performed by: OBSTETRICS & GYNECOLOGY

## 2022-08-08 PROCEDURE — 99213 OFFICE O/P EST LOW 20 MIN: CPT | Mod: PBBFAC,25,TH,PN | Performed by: OBSTETRICS & GYNECOLOGY

## 2022-08-08 PROCEDURE — 76815 PRENATAL TESTING - NST/AFI: ICD-10-PCS | Mod: 26,,, | Performed by: OBSTETRICS & GYNECOLOGY

## 2022-08-08 PROCEDURE — 59025 FETAL NON-STRESS TEST: CPT

## 2022-08-08 PROCEDURE — 87481 CANDIDA DNA AMP PROBE: CPT | Mod: 59

## 2022-08-08 PROCEDURE — 76815 OB US LIMITED FETUS(S): CPT | Mod: 26,,, | Performed by: OBSTETRICS & GYNECOLOGY

## 2022-08-08 PROCEDURE — 87801 DETECT AGNT MULT DNA AMPLI: CPT

## 2022-08-08 PROCEDURE — 99999 PR PBB SHADOW E&M-EST. PATIENT-LVL III: ICD-10-PCS | Mod: PBBFAC,,, | Performed by: OBSTETRICS & GYNECOLOGY

## 2022-08-08 PROCEDURE — 59025 FETAL NON-STRESS TEST: CPT | Mod: 26,,, | Performed by: OBSTETRICS & GYNECOLOGY

## 2022-08-08 PROCEDURE — 99214 PR OFFICE/OUTPT VISIT, EST, LEVL IV, 30-39 MIN: ICD-10-PCS | Mod: 25,TH,S$PBB, | Performed by: OBSTETRICS & GYNECOLOGY

## 2022-08-08 RX ORDER — HYDROCORTISONE 1 %
CREAM (GRAM) TOPICAL
Qty: 28.35 G | Refills: 1 | Status: SHIPPED | OUTPATIENT
Start: 2022-08-08 | End: 2023-08-08

## 2022-08-08 RX ORDER — HYDROCORTISONE ACETATE PRAMOXINE HCL 1; 1 G/100G; G/100G
CREAM TOPICAL
Qty: 30 G | Refills: 1 | Status: SHIPPED | OUTPATIENT
Start: 2022-08-08 | End: 2022-08-08

## 2022-08-08 NOTE — H&P
HISTORY AND PHYSICAL                                                OBSTETRICS          Subjective:       Dipti Villafana is a 37 y.o.  female with IUP at 39w0d weeks gestation who presents for induction of labor for GDMA2.    Patient denies contractions, denies vaginal bleeding, denies LOF.   Fetal Movement: normal.    This IUP is complicated by gestational diabetes (on metformin), maternal obesity (pre-preg BMI 37), AMA (seq. screen normal), undesired fertility, resolved low-lying placenta.    Review of Systems   Constitutional: Negative for chills and fever.   HENT: Negative for nasal congestion.    Eyes: Negative for visual disturbance.   Respiratory: Negative for shortness of breath.    Cardiovascular: Negative for chest pain and leg swelling.   Gastrointestinal: Negative for abdominal pain, nausea and vomiting.   Genitourinary: Negative for dysuria, vaginal bleeding and vaginal discharge.   Musculoskeletal: Negative for back pain.   Neurological: Negative for headaches.   Psychiatric/Behavioral: The patient is not nervous/anxious.        PMHx:   Past Medical History:   Diagnosis Date    Anemia     Gestational diabetes mellitus (GDM) affecting pregnancy 2022       PSHx: History reviewed. No pertinent surgical history.    All: Review of patient's allergies indicates:  No Known Allergies    Meds: (Not in a hospital admission)      SH:   Social History     Socioeconomic History    Marital status: Single   Tobacco Use    Smoking status: Never Smoker    Smokeless tobacco: Never Used   Substance and Sexual Activity    Alcohol use: Not Currently     Comment: occasional    Drug use: No    Sexual activity: Not Currently     Partners: Male     Birth control/protection: None     Comment: Mirena        FH:   Family History   Problem Relation Age of Onset    Diabetes Father     Hypertension Mother     Diabetes Mother     Heart failure Sister     Breast cancer Neg Hx     Colon cancer Neg Hx      Ovarian cancer Neg Hx        OBHx:   OB History    Para Term  AB Living   3 2 2 0 0 3   SAB IAB Ectopic Multiple Live Births   0 0 0 1 3      # Outcome Date GA Lbr Jaspreet/2nd Weight Sex Delivery Anes PTL Lv   3 Current            2 Term 12   2.722 kg (6 lb) M Vag-Spont   JUAN CARLOS   1A Term 03/12/10    M    JUAN CARLOS      Birth Comments: twin   1B Term 03/12/10    M Vag-Spont  N JUAN CARLOS      Birth Comments: twin       Objective:       /60   Wt 98.7 kg (217 lb 9.5 oz)   LMP 2021   BMI 42.50 kg/m²     Vitals:    22 0919   BP: 122/60   Weight: 98.7 kg (217 lb 9.5 oz)       General:   alert, appears stated age and cooperative, no apparent distress   HENT:  normocephalic, atraumatic   Eyes:  extraocular movements and conjunctivae normal   Neck:  supple, range of motion normal, no thyromegaly   Lungs:   no respiratory distress   Heart:   regular rate   Abdomen:  soft, non-tender, non-distended but gravid, no rebound or guarding   Extremities negative edema, negative erythema   FHT: To be performed upon admission                 TOCO: To be performed upon admission   Presentations: To be performed upon admission   Cervix: To be performed upon admission     EFW by Leopold's: 8.5    Recent Growth Scan: US on 8/3, EFW 3160g, 63%    Lab Review  Blood Type O POS  GBBS: negative  Rubella: Immune  RPR: non-reactive  HIV: negative  HepB: negative       Assessment:   Dipti Villafana is a 37 y.o.  female with IUP at 39w0d weeks gestation who presents for induction of labor for GDMA2.    Plan:   1. Induction of Labor   Risks, benefits, alternatives and possible complications have been discussed in detail with the patient.   - Consents signed and to chart  - Admit to Labor and Delivery unit  - Epidural per Anesthesia  - Draw CBC, T&S  - IOL plan per L&D team  - EFW 3160g  - Maternal pelvis proven to 6lb 14oz    Post-Partum Hemorrhage risk - medium    2. Gestational Diabetes  - on metformin 500 once  daily with good control  - EFW 3160  - DC metformin after delivery  - Plan for 2 hour GTT at post partum visit    3. Maternal Obesity  - pre-preg BMI 37  - encourage ambulation post partum    4. AMA  - Sequential screen normal  - Most recent ultrasound normal, previously noted low-lying placenta has resolved as of 8/3    5. Undesired fertility  - BTL consents signed and mature    Dorina Jimenez MD  PGY-1  Obstetrics & Gynecology     Seen and examined.  Agree with note.  All questions answered  LINO Stone MD

## 2022-08-08 NOTE — PROGRESS NOTES
Pregnancy dating, labs, ultrasound reports, prenatal testing, and problem list; prior records and results; and available outside records were reviewed and updated in EMR.  Pertinent findings were noted below.    Reason for Visit: Routine Prenatal Visit, Hemorrhoids (Request rec for hemorrhoids), and Vaginitis (C/o YEAST sx started over the weekend)    36w5d by Estimated Date of Delivery: 8/31/22    Blood pressure 122/60, weight 98.7 kg (217 lb 9.5 oz), last menstrual period 11/25/2021.    Gestational diabetes mellitus (GDM) in third trimester controlled on oral hypoglycemic drug  -     IP OB Labor Induction; Future; Expected date: 08/24/2022    Vaginal discharge  -     Vaginosis Screen by DNA Probe    Hemorrhoids during pregnancy in third trimester  -     Discontinue: pramoxine-hydrocortisone (PROCTOCREAM-HC) 1-1 % rectal cream; Place rectally as needed for Hemorrhoids.  Dispense: 30 g; Refill: 1  -     hydrocortisone 1 % cream; Apply to affected area 2 times daily  Dispense: 30 g; Refill: 1       No contractions, bleeding, or loss of fluid.  +fetal movement.  Labs reviewed and up to date.   Glucose log reviewed and all fasting 90-98, all postprandial 100-119   Patient reporting vaginal irritation and discharge, Affirm done. Will follow up as resulted   Patient reporting symptomatic hemorrhoids visualized on exam. Proctocream sent to pharmacy, not covered under insurance, order canceled. Hydrocortisone cream sent instead.     Weekly PNT scheduled     As low lying placenta has resolved, patient consented for vaginal delivery and scheduled for IOL on 8/24    Labor precautions given  Follow-up: 1 week    Dorina Jimenez MD  PGY-1  Obstetrics & Gynecology

## 2022-08-08 NOTE — TELEPHONE ENCOUNTER
----- Message from Imelda Curtis sent at 8/8/2022  3:11 PM CDT -----  Pt called to see if she can be seen earlier on 08/15/22 after her prenatal testing that morning at 8am.    The pt can be reached at [phone]

## 2022-08-10 ENCOUNTER — PATIENT MESSAGE (OUTPATIENT)
Dept: OBSTETRICS AND GYNECOLOGY | Facility: CLINIC | Age: 37
End: 2022-08-10
Payer: MEDICAID

## 2022-08-15 ENCOUNTER — ROUTINE PRENATAL (OUTPATIENT)
Dept: OBSTETRICS AND GYNECOLOGY | Facility: CLINIC | Age: 37
End: 2022-08-15
Payer: MEDICAID

## 2022-08-15 ENCOUNTER — HOSPITAL ENCOUNTER (OUTPATIENT)
Dept: PERINATAL CARE | Facility: OTHER | Age: 37
Discharge: HOME | End: 2022-08-15
Attending: OBSTETRICS & GYNECOLOGY
Payer: MEDICAID

## 2022-08-15 VITALS
DIASTOLIC BLOOD PRESSURE: 72 MMHG | BODY MASS INDEX: 43.23 KG/M2 | WEIGHT: 221.31 LBS | SYSTOLIC BLOOD PRESSURE: 112 MMHG

## 2022-08-15 DIAGNOSIS — O99.213 OBESITY AFFECTING PREGNANCY IN THIRD TRIMESTER: ICD-10-CM

## 2022-08-15 DIAGNOSIS — O24.419 GESTATIONAL DIABETES MELLITUS (GDM) IN THIRD TRIMESTER, GESTATIONAL DIABETES METHOD OF CONTROL UNSPECIFIED: ICD-10-CM

## 2022-08-15 DIAGNOSIS — O09.529 AMA (ADVANCED MATERNAL AGE) MULTIGRAVIDA 35+, UNSPECIFIED TRIMESTER: ICD-10-CM

## 2022-08-15 DIAGNOSIS — Z34.80 SUPERVISION OF OTHER NORMAL PREGNANCY: Primary | ICD-10-CM

## 2022-08-15 DIAGNOSIS — O24.415 GESTATIONAL DIABETES MELLITUS (GDM) IN THIRD TRIMESTER CONTROLLED ON ORAL HYPOGLYCEMIC DRUG: ICD-10-CM

## 2022-08-15 PROBLEM — O44.40 LOW-LYING PLACENTA: Status: RESOLVED | Noted: 2022-07-19 | Resolved: 2022-08-15

## 2022-08-15 PROBLEM — O24.410 DIET CONTROLLED GESTATIONAL DIABETES MELLITUS (GDM), ANTEPARTUM: Status: RESOLVED | Noted: 2022-06-17 | Resolved: 2022-08-15

## 2022-08-15 PROCEDURE — 99999 PR PBB SHADOW E&M-EST. PATIENT-LVL II: CPT | Mod: PBBFAC,,, | Performed by: OBSTETRICS & GYNECOLOGY

## 2022-08-15 PROCEDURE — 87081 CULTURE SCREEN ONLY: CPT | Performed by: OBSTETRICS & GYNECOLOGY

## 2022-08-15 PROCEDURE — 99999 PR PBB SHADOW E&M-EST. PATIENT-LVL II: ICD-10-PCS | Mod: PBBFAC,,, | Performed by: OBSTETRICS & GYNECOLOGY

## 2022-08-15 PROCEDURE — 59025 FETAL NON-STRESS TEST: CPT

## 2022-08-15 PROCEDURE — 59025 PRENATAL TESTING - NST/AFI: ICD-10-PCS | Mod: 26,,, | Performed by: OBSTETRICS & GYNECOLOGY

## 2022-08-15 PROCEDURE — 99214 PR OFFICE/OUTPT VISIT, EST, LEVL IV, 30-39 MIN: ICD-10-PCS | Mod: 25,TH,S$PBB, | Performed by: OBSTETRICS & GYNECOLOGY

## 2022-08-15 PROCEDURE — 76815 OB US LIMITED FETUS(S): CPT

## 2022-08-15 PROCEDURE — 99212 OFFICE O/P EST SF 10 MIN: CPT | Mod: PBBFAC,25,TH,PN | Performed by: OBSTETRICS & GYNECOLOGY

## 2022-08-15 PROCEDURE — 76815 OB US LIMITED FETUS(S): CPT | Mod: 26,,, | Performed by: OBSTETRICS & GYNECOLOGY

## 2022-08-15 PROCEDURE — 76815 PRENATAL TESTING - NST/AFI: ICD-10-PCS | Mod: 26,,, | Performed by: OBSTETRICS & GYNECOLOGY

## 2022-08-15 PROCEDURE — 59025 FETAL NON-STRESS TEST: CPT | Mod: 26,,, | Performed by: OBSTETRICS & GYNECOLOGY

## 2022-08-15 PROCEDURE — 99214 OFFICE O/P EST MOD 30 MIN: CPT | Mod: 25,TH,S$PBB, | Performed by: OBSTETRICS & GYNECOLOGY

## 2022-08-15 NOTE — PROGRESS NOTES
Trace blood  
Pregnancy dating, labs, ultrasound reports, prenatal testing, and problem list; prior records and results; and available outside records were reviewed and updated in EMR.  Pertinent findings were noted below.    Reason for Visit: Routine Prenatal Visit    37w5d by Estimated Date of Delivery: 8/31/22    Blood pressure 112/72, weight 100.4 kg (221 lb 5.5 oz), last menstrual period 11/25/2021.    Supervision of other normal pregnancy  -     Strep B Screen, Vaginal / Rectal    Gestational diabetes mellitus (GDM) in third trimester controlled on oral hypoglycemic drug    Obesity affecting pregnancy in third trimester    AMA (advanced maternal age) multigravida 35+, unspecified trimester       No contractions, bleeding, or loss of fluid.  +fetal movement.  Labs reviewed and up to date. GBS would be >5 weeks on day of IOL therefore, repeat GBS sent today.   Taking metformin 500mg qHS. Reviewed log, uploaded to media. 3 postprandial blood sugars >120 in the past month (highest 140). No indication to change medication regimen at this time.   Continue PNT. Serial growth, 36wk US 63%tile. Low lying placenta resolved.   Patient counseled about risk of shoulder dystocia with GDM and importance of good glycemic control.   Desires BTL after delivery.    Labor and preE precautions given  Follow-up: 1 week     Dorina Jimenez MD  PGY-1  Obstetrics & Gynecology     Total time of 40 minutes, including face-to-face time and non-face-to-face time preparing to see the patient (eg, review of tests), obtaining and/or reviewing separately obtained history, documenting clinical information in the electronic or other health record, independently interpreting results, communicating results to the patient/family/caregiver, or care coordination  Seen and examined.  Agree with note.  All questions answered  LINO Stone MD      
PAIN SCALE 7 OF 10.

## 2022-08-17 ENCOUNTER — ANESTHESIA (OUTPATIENT)
Dept: OBSTETRICS AND GYNECOLOGY | Facility: OTHER | Age: 37
End: 2022-08-17
Payer: MEDICAID

## 2022-08-17 ENCOUNTER — ANESTHESIA EVENT (OUTPATIENT)
Dept: OBSTETRICS AND GYNECOLOGY | Facility: OTHER | Age: 37
End: 2022-08-17
Payer: MEDICAID

## 2022-08-17 ENCOUNTER — HOSPITAL ENCOUNTER (INPATIENT)
Facility: OTHER | Age: 37
LOS: 4 days | Discharge: HOME | End: 2022-08-21
Attending: OBSTETRICS & GYNECOLOGY | Admitting: OBSTETRICS & GYNECOLOGY
Payer: MEDICAID

## 2022-08-17 DIAGNOSIS — Z98.891 STATUS POST PRIMARY LOW TRANSVERSE CESAREAN SECTION: Primary | ICD-10-CM

## 2022-08-17 DIAGNOSIS — Z34.90 ENCOUNTER FOR INDUCTION OF LABOR: ICD-10-CM

## 2022-08-17 LAB
ABO + RH BLD: NORMAL
BASOPHILS # BLD AUTO: 0.01 K/UL (ref 0–0.2)
BASOPHILS NFR BLD: 0.2 % (ref 0–1.9)
BLD GP AB SCN CELLS X3 SERPL QL: NORMAL
DIFFERENTIAL METHOD: ABNORMAL
EOSINOPHIL # BLD AUTO: 0.1 K/UL (ref 0–0.5)
EOSINOPHIL NFR BLD: 1.1 % (ref 0–8)
ERYTHROCYTE [DISTWIDTH] IN BLOOD BY AUTOMATED COUNT: 15.9 % (ref 11.5–14.5)
HCT VFR BLD AUTO: 36.8 % (ref 37–48.5)
HGB BLD-MCNC: 11.5 G/DL (ref 12–16)
IMM GRANULOCYTES # BLD AUTO: 0.09 K/UL (ref 0–0.04)
IMM GRANULOCYTES NFR BLD AUTO: 1.4 % (ref 0–0.5)
LYMPHOCYTES # BLD AUTO: 1.3 K/UL (ref 1–4.8)
LYMPHOCYTES NFR BLD: 19.2 % (ref 18–48)
MCH RBC QN AUTO: 24.8 PG (ref 27–31)
MCHC RBC AUTO-ENTMCNC: 31.3 G/DL (ref 32–36)
MCV RBC AUTO: 80 FL (ref 82–98)
MONOCYTES # BLD AUTO: 0.8 K/UL (ref 0.3–1)
MONOCYTES NFR BLD: 12.2 % (ref 4–15)
NEUTROPHILS # BLD AUTO: 4.4 K/UL (ref 1.8–7.7)
NEUTROPHILS NFR BLD: 65.9 % (ref 38–73)
NRBC BLD-RTO: 0 /100 WBC
PLATELET # BLD AUTO: 344 K/UL (ref 150–450)
PMV BLD AUTO: 9.8 FL (ref 9.2–12.9)
POCT GLUCOSE: 161 MG/DL (ref 70–110)
POCT GLUCOSE: 71 MG/DL (ref 70–110)
POCT GLUCOSE: 75 MG/DL (ref 70–110)
RBC # BLD AUTO: 4.63 M/UL (ref 4–5.4)
WBC # BLD AUTO: 6.63 K/UL (ref 3.9–12.7)

## 2022-08-17 PROCEDURE — 62326 NJX INTERLAMINAR LMBR/SAC: CPT | Performed by: ANESTHESIOLOGY

## 2022-08-17 PROCEDURE — 63600175 PHARM REV CODE 636 W HCPCS

## 2022-08-17 PROCEDURE — 99285 EMERGENCY DEPT VISIT HI MDM: CPT | Mod: 25

## 2022-08-17 PROCEDURE — C1751 CATH, INF, PER/CENT/MIDLINE: HCPCS | Performed by: ANESTHESIOLOGY

## 2022-08-17 PROCEDURE — 59025 FETAL NON-STRESS TEST: CPT | Mod: 26,,, | Performed by: OBSTETRICS & GYNECOLOGY

## 2022-08-17 PROCEDURE — 86850 RBC ANTIBODY SCREEN: CPT

## 2022-08-17 PROCEDURE — 59514 CESAREAN DELIVERY ONLY: CPT | Mod: AA,,, | Performed by: ANESTHESIOLOGY

## 2022-08-17 PROCEDURE — 11000001 HC ACUTE MED/SURG PRIVATE ROOM

## 2022-08-17 PROCEDURE — 85025 COMPLETE CBC W/AUTO DIFF WBC: CPT

## 2022-08-17 PROCEDURE — 86920 COMPATIBILITY TEST SPIN: CPT | Performed by: OBSTETRICS & GYNECOLOGY

## 2022-08-17 PROCEDURE — 59025 PR FETAL 2N-STRESS TEST: ICD-10-PCS | Mod: 26,,, | Performed by: OBSTETRICS & GYNECOLOGY

## 2022-08-17 PROCEDURE — 01968 ANES/ANALG CS DLVR NEURAXIAL: CPT | Mod: AA,,, | Performed by: ANESTHESIOLOGY

## 2022-08-17 PROCEDURE — 99283 EMERGENCY DEPT VISIT LOW MDM: CPT | Mod: 25,,, | Performed by: OBSTETRICS & GYNECOLOGY

## 2022-08-17 PROCEDURE — 99283 PR EMERGENCY DEPT VISIT,LEVEL III: ICD-10-PCS | Mod: 25,,, | Performed by: OBSTETRICS & GYNECOLOGY

## 2022-08-17 PROCEDURE — 25000003 PHARM REV CODE 250: Performed by: ANESTHESIOLOGY

## 2022-08-17 PROCEDURE — 01968 PR INSERT CATH,ART,PERCUT,SHORTTERM: ICD-10-PCS | Mod: AA,,, | Performed by: ANESTHESIOLOGY

## 2022-08-17 PROCEDURE — 82962 GLUCOSE BLOOD TEST: CPT

## 2022-08-17 PROCEDURE — 59514 PRA REAN DELIVERY ONLY: ICD-10-PCS | Mod: AA,,, | Performed by: ANESTHESIOLOGY

## 2022-08-17 PROCEDURE — 51702 INSERT TEMP BLADDER CATH: CPT

## 2022-08-17 PROCEDURE — 27200710 HC EPIDURAL INFUSION PUMP SET: Performed by: ANESTHESIOLOGY

## 2022-08-17 RX ORDER — TERBUTALINE SULFATE 1 MG/ML
INJECTION SUBCUTANEOUS
Status: DISPENSED
Start: 2022-08-17 | End: 2022-08-18

## 2022-08-17 RX ORDER — FENTANYL/BUPIVACAINE/NS/PF 2MCG/ML-.1
PLASTIC BAG, INJECTION (ML) INJECTION
Status: COMPLETED
Start: 2022-08-17 | End: 2022-08-17

## 2022-08-17 RX ORDER — SODIUM CHLORIDE, SODIUM LACTATE, POTASSIUM CHLORIDE, CALCIUM CHLORIDE 600; 310; 30; 20 MG/100ML; MG/100ML; MG/100ML; MG/100ML
INJECTION, SOLUTION INTRAVENOUS CONTINUOUS
Status: DISCONTINUED | OUTPATIENT
Start: 2022-08-17 | End: 2022-08-18

## 2022-08-17 RX ORDER — OXYTOCIN/RINGER'S LACTATE 30/500 ML
0-30 PLASTIC BAG, INJECTION (ML) INTRAVENOUS CONTINUOUS
Status: DISCONTINUED | OUTPATIENT
Start: 2022-08-17 | End: 2022-08-18

## 2022-08-17 RX ORDER — FENTANYL CITRATE 50 UG/ML
INJECTION, SOLUTION INTRAMUSCULAR; INTRAVENOUS
Status: COMPLETED
Start: 2022-08-17 | End: 2022-08-18

## 2022-08-17 RX ORDER — SODIUM CITRATE AND CITRIC ACID MONOHYDRATE 334; 500 MG/5ML; MG/5ML
30 SOLUTION ORAL ONCE
Status: COMPLETED | OUTPATIENT
Start: 2022-08-17 | End: 2022-08-18

## 2022-08-17 RX ORDER — FENTANYL/BUPIVACAINE/NS/PF 2MCG/ML-.1
PLASTIC BAG, INJECTION (ML) INJECTION CONTINUOUS
Status: DISCONTINUED | OUTPATIENT
Start: 2022-08-17 | End: 2022-08-18

## 2022-08-17 RX ORDER — FAMOTIDINE 10 MG/ML
INJECTION INTRAVENOUS
Status: COMPLETED
Start: 2022-08-17 | End: 2022-08-18

## 2022-08-17 RX ORDER — TRANEXAMIC ACID 100 MG/ML
1000 INJECTION, SOLUTION INTRAVENOUS ONCE AS NEEDED
Status: DISCONTINUED | OUTPATIENT
Start: 2022-08-17 | End: 2022-08-18

## 2022-08-17 RX ORDER — CALCIUM CARBONATE 200(500)MG
500 TABLET,CHEWABLE ORAL 3 TIMES DAILY PRN
Status: DISCONTINUED | OUTPATIENT
Start: 2022-08-17 | End: 2022-08-18

## 2022-08-17 RX ORDER — LIDOCAINE HYDROCHLORIDE 10 MG/ML
10 INJECTION INFILTRATION; PERINEURAL ONCE AS NEEDED
Status: DISCONTINUED | OUTPATIENT
Start: 2022-08-17 | End: 2022-08-18

## 2022-08-17 RX ORDER — OXYTOCIN/RINGER'S LACTATE 30/500 ML
334 PLASTIC BAG, INJECTION (ML) INTRAVENOUS ONCE
Status: DISCONTINUED | OUTPATIENT
Start: 2022-08-17 | End: 2022-08-18

## 2022-08-17 RX ORDER — ONDANSETRON 8 MG/1
8 TABLET, ORALLY DISINTEGRATING ORAL EVERY 8 HOURS PRN
Status: DISCONTINUED | OUTPATIENT
Start: 2022-08-17 | End: 2022-08-18

## 2022-08-17 RX ORDER — CEFAZOLIN SODIUM 2 G/50ML
2 SOLUTION INTRAVENOUS ONCE AS NEEDED
Status: DISCONTINUED | OUTPATIENT
Start: 2022-08-17 | End: 2022-08-18

## 2022-08-17 RX ORDER — SIMETHICONE 80 MG
1 TABLET,CHEWABLE ORAL 4 TIMES DAILY PRN
Status: DISCONTINUED | OUTPATIENT
Start: 2022-08-17 | End: 2022-08-18

## 2022-08-17 RX ORDER — OXYTOCIN/RINGER'S LACTATE 30/500 ML
95 PLASTIC BAG, INJECTION (ML) INTRAVENOUS ONCE
Status: DISCONTINUED | OUTPATIENT
Start: 2022-08-17 | End: 2022-08-18

## 2022-08-17 RX ORDER — PROCHLORPERAZINE EDISYLATE 5 MG/ML
5 INJECTION INTRAMUSCULAR; INTRAVENOUS EVERY 6 HOURS PRN
Status: DISCONTINUED | OUTPATIENT
Start: 2022-08-17 | End: 2022-08-18

## 2022-08-17 RX ADMIN — FENTANYL CITRATE 100 MCG: 50 INJECTION, SOLUTION INTRAMUSCULAR; INTRAVENOUS at 06:08

## 2022-08-17 RX ADMIN — Medication 4 MILLI-UNITS/MIN: at 02:08

## 2022-08-17 RX ADMIN — Medication 10 ML/HR: at 06:08

## 2022-08-17 NOTE — ANESTHESIA PREPROCEDURE EVALUATION
2022  [facility]  Anesthesia Pre-Operative Evaluation         Patient Name: Dipti Villafana  YOB: 1985  MRN: [id number]    2022      Dipti Villafana is a 37 y.o. female  @ 38w0d who presents for IOL due to gDM.  IUP c/b gDM, MO, AMA, and low lying placenta.  Desires tubal after delivery.      OB History    Para Term  AB Living   3 2 2 0 0 3   SAB IAB Ectopic Multiple Live Births   0 0 0 1 3      # Outcome Date GA Lbr Jaspreet/2nd Weight Sex Delivery Anes PTL Lv   3 Current            2 Term 12   2.722 kg (6 lb) M Vag-Spont   JUAN CARLOS   1A Term 03/12/10    M    JUAN CARLOS      Birth Comments: twin   1B Term 03/12/10    M Vag-Spont  N JUAN CARLOS      Birth Comments: twin       Review of patient's allergies indicates:  No Known Allergies    Wt Readings from Last 1 Encounters:   08/15/22 0856 100.4 kg (221 lb 5.5 oz)       BP Readings from Last 3 Encounters:   22 122/73   08/15/22 112/72   22 122/60       Patient Active Problem List   Diagnosis    Depressive disorder    AMA (advanced maternal age) multigravida 35+    Morbid obesity with BMI of 40.0-44.9, adult    Mental disorder affecting pregnancy, antepartum    Obesity affecting pregnancy, antepartum    Sterilization    Vaginal bleeding    Unwanted fertility    33 weeks gestation of pregnancy    Gestational diabetes mellitus (GDM) controlled on oral hypoglycemic drug    Encounter for induction of labor       No past surgical history on file.    Social History     Socioeconomic History    Marital status: Single   Tobacco Use    Smoking status: Never Smoker    Smokeless tobacco: Never Used   Substance and Sexual Activity    Alcohol use: Not Currently     Comment: occasional    Drug use: No    Sexual activity: Not Currently     Partners: Male     Birth control/protection: None     Comment:  Mirena 2012         Chemistry        Component Value Date/Time     (L) 10/12/2011 1810    K 3.6 10/12/2011 1810     10/12/2011 1810    CO2 22 (L) 10/12/2011 1810    BUN 6 10/12/2011 1810    CREATININE 0.7 10/12/2011 1810    GLU 87 10/12/2011 1810        Component Value Date/Time    CALCIUM 9.3 10/12/2011 1810    ALKPHOS 53 (L) 10/12/2011 1810    AST 21 10/12/2011 1810    ALT 15 10/12/2011 1810    BILITOT 0.2 10/12/2011 1810    ESTGFRAFRICA >60 10/12/2011 1810    EGFRNONAA >60 10/12/2011 1810            Lab Results   Component Value Date    WBC 7.31 07/18/2022    HGB 11.1 (L) 07/18/2022    HCT 35.1 (L) 07/18/2022    MCV 79 (L) 07/18/2022     07/18/2022       No results for input(s): PT, INR, PROTIME, APTT in the last 72 hours.            Pre-op Assessment    I have reviewed the Patient Summary Reports.     I have reviewed the Nursing Notes. I have reviewed the NPO Status.   I have reviewed the Medications.     Review of Systems  Anesthesia Hx:  No problems with previous Anesthesia Denies Hx of Anesthetic complications  Denies Family Hx of Anesthesia complications.   Denies Personal Hx of Anesthesia complications.   Social:  Non-Smoker    Hematology/Oncology:  Hematology Normal   Oncology Normal     EENT/Dental:EENT/Dental Normal   Cardiovascular:  Cardiovascular Normal     Pulmonary:  Pulmonary Normal    Renal/:  Renal/ Normal     Hepatic/GI:  Hepatic/GI Normal    Musculoskeletal:  Musculoskeletal Normal    Neurological:  Neurology Normal    Endocrine:   Diabetes    Psych:   Psychiatric History          Physical Exam  General: Well nourished, Cooperative and Alert    Airway:  Mallampati: III   Mouth Opening: Normal  TM Distance: Normal  Tongue: Normal  Neck ROM: Normal ROM    Dental:  Intact        Anesthesia Plan  Type of Anesthesia, risks & benefits discussed:    Anesthesia Type: Epidural  Intra-op Monitoring Plan: Standard ASA Monitors  Post Op Pain Control Plan: multimodal analgesia, IV/PO  Opioids PRN and epidural analgesia  Informed Consent: Informed consent signed with the Patient and all parties understand the risks and agree with anesthesia plan.  All questions answered.   ASA Score: 2  Day of Surgery Review of History & Physical: H&P Update referred to the surgeon/provider.    Ready For Surgery From Anesthesia Perspective.     .

## 2022-08-17 NOTE — PROGRESS NOTES
LABOR NOTE    S:  Complaints: No.  Epidural working:  not applicable      O: /69   Pulse 90   Temp 98.2 °F (36.8 °C) (Oral)   Resp 18   Ht 5' (1.524 m)   Wt 100.4 kg (221 lb 5.5 oz)   LMP 11/25/2021   SpO2 100%   Breastfeeding No   BMI 43.23 kg/m²     FHT: 130 Cat 1 (reassuring)  CTX: q 2 minutes, pit @ 12  SVE: 4/50/-3    TIMELINE:   1700: 4/50/-3, patient intolerable to checks. Will get epidural before next check     PLAN:    Continue Close Maternal/Fetal Monitoring  Pitocin Augmentation per protocol  Recheck 2-4 hours or PRN    GDM  -q4h POCT glucose during latent labor  -q2h POCT glucose during active labor  -Last glucose- 161    Michell Cordero MD  [facility]   OBGYN PGY1

## 2022-08-17 NOTE — ANESTHESIA PROCEDURE NOTES
Epidural    Patient location during procedure: OB   Reason for block: primary anesthetic   Reason for block: labor analgesia requested by patient and obstetrician  Diagnosis: IUP   Start time: 8/17/2022 5:53 PM  Timeout: 8/17/2022 5:51 PM  End time: 8/17/2022 6:00 PM  Surgery related to: Vaginal Delivery    Staffing  Performing Provider: Juana Valencia MD  Authorizing Provider: Juana Valencia MD        Preanesthetic Checklist  Completed: patient identified, IV checked, site marked, risks and benefits discussed, surgical consent, monitors and equipment checked, pre-op evaluation, timeout performed, anesthesia consent given, hand hygiene performed and patient being monitored  Preparation  Patient position: sitting  Prep: ChloraPrep  Patient monitoring: Pulse Ox  Reason for block: primary anesthetic   Epidural  Skin Anesthetic: lidocaine 1%  Skin Wheal: 3 mL  Administration type: continuous  Approach: midline  Interspace: L3-4    Injection technique: ELAINE saline  Needle and Epidural Catheter  Needle type: Tuohy   Needle gauge: 17  Needle length: 3.5 inches  Needle insertion depth: 8 cm  Catheter type: springw[employer]  Catheter size: 19 G  Catheter at skin depth: 12 cm  Insertion Attempts: 2  Test dose: 3 mL of lidocaine 1.5% with Epi 1-to-200,000  Additional Documentation: incremental injection, negative aspiration for heme and CSF, no paresthesia on injection, no signs/symptoms of IV or SA injection, no significant pain on injection and no significant complaints from patient  Needle localization: anatomical landmarks  Medications:  Volume per aspiration: 5 mL  Time between aspirations: 5 minutes   Assessment  Ease of block: easy  Patient's tolerance of the procedure: comfortable throughout block and no complaints  Additional Notes  B0.1%F2  5+5mL + fent 100mcg via epidural No inadvertent dural puncture with Tuohy.  Dural puncture performed with spinal needle.

## 2022-08-17 NOTE — ED PROVIDER NOTES
Encounter Date: 2022       History     Chief Complaint   Patient presents with    Vaginal Bleeding     HPI   Dipti Villafana is a 37 y.o. [id number] at 38w0d presents complaining of vaginal bleeding. Bleeding had started around 10 am this morning. She felt a gush of blood while sitting down. She put on a panty liner and presented to the [facility], with the panty liner saturated.      This IUP is complicated by GDM (metformin 500mg nightly), AMA, MO (BMI >40), AC 98%, and a h/o of low lying placenta this pregnancy that is now resolved.  Patient denies contractions, reports vaginal bleeding, denies LOF.   Fetal Movement: normal.     Review of patient's allergies indicates:  No Known Allergies  Past Medical History:   Diagnosis Date    Anemia     Gestational diabetes mellitus (GDM) affecting pregnancy 2022     History reviewed. No pertinent surgical history.  Family History   Problem Relation Age of Onset    Diabetes Father     Hypertension Mother     Diabetes Mother     Heart failure Sister     Breast cancer Neg Hx     Colon cancer Neg Hx     Ovarian cancer Neg Hx      Social History     Tobacco Use    Smoking status: Never Smoker    Smokeless tobacco: Never Used   Substance Use Topics    Alcohol use: Not Currently     Comment: occasional    Drug use: No     Review of Systems   Constitutional: Negative for fever.   HENT: Negative for postnasal drip, rhinorrhea and sore throat.    Respiratory: Negative for shortness of breath.    Cardiovascular: Negative for chest pain.   Gastrointestinal: Negative for abdominal pain and diarrhea.   Genitourinary: Positive for vaginal bleeding. Negative for difficulty urinating and pelvic pain.   Skin: Negative for color change.   Neurological: Negative for light-headedness and headaches.   Psychiatric/Behavioral: Negative for confusion.   All other systems reviewed and are negative.      Physical Exam     Initial Vitals   BP Pulse Resp Temp SpO2   22 1231  08/17/22 1231 08/17/22 1231 08/17/22 1231 08/17/22 1228   129/83 (!) 113 20 98.2 °F (36.8 °C) 98 %      MAP       --                Physical Exam    Constitutional: She appears well-developed and well-nourished. No distress.   HENT:   Head: Normocephalic and atraumatic.   Eyes: EOM are normal.   Cardiovascular: Normal rate.   Pulmonary/Chest: She has no wheezes.   Abdominal: Abdomen is soft. There is no abdominal tenderness.   Musculoskeletal:         General: Normal range of motion.     Neurological: She is alert and oriented to person, place, and time.   Skin: Skin is warm, dry and intact. No rash noted.   Psychiatric: She has a normal mood and affect. Her speech is normal and behavior is normal.     OB LABOR EXAM:       Method: Sterile vaginal exam per MD.   Vaginal Bleeding: bright red.     Dilatation: 4.   Station: -3.   Effacement: 50%.       Comments: About 15cc of clot evacuated.        ED Course   Fetal non-stress test    Date/Time: 8/17/2022 5:43 PM  Performed by: Ana Beckford MD  Authorized by: Hodan Stone MD     Nonstress Test:     Variability:  6-25 BPM    Decelerations:  None    Accelerations:  15 bpm    Baseline:  135    Contractions:  Not present  Biophysical Profile:     Nonstress Test Interpretation: reactive      Overall Impression:  Reassuring      Labs Reviewed   POCT GLUCOSE - Abnormal; Notable for the following components:       Result Value    POCT Glucose 161 (*)     All other components within normal limits        Ultrasound: baby cephalic on presentation. MVP 2x1.5cm.  Imaging Results    None          Medications   lactated ringers bolus 1,000 mL (has no administration in time range)   lactated ringers bolus 500 mL (has no administration in time range)   lactated ringers infusion (has no administration in time range)   oxytocin 30 units in 500 mL lactated ringers infusion (non-titrating) (has no administration in time range)   oxytocin 30 units in 500 mL lactated ringers  infusion (non-titrating) (has no administration in time range)   tranexamic acid (CYKLOKAPRON) 1,000 mg in sodium chloride 0.9 % 100 mL (ready to mix system) (has no administration in time range)   cefazolin (ANCEF) 2 gram in dextrose 5% 50 mL IVPB (premix) (has no administration in time range)   azithromycin 500 mg in dextrose 5 % 250 mL IVPB (ready to mix system) (has no administration in time range)   ondansetron disintegrating tablet 8 mg (has no administration in time range)   prochlorperazine injection Soln 5 mg (has no administration in time range)   calcium carbonate 200 mg calcium (500 mg) chewable tablet 500 mg (has no administration in time range)   simethicone chewable tablet 80 mg (has no administration in time range)   LIDOcaine HCL 10 mg/ml (1%) injection 10 mL (has no administration in time range)   oxytocin 30 units in 500 mL lactated ringers infusion (titrating) (12 andrew-units/min Intravenous Verify Only 22 1606)   lactated ringers bolus 1,000 mL (has no administration in time range)   sodium citrate-citric acid 500-334 mg/5 ml solution 30 mL (has no administration in time range)   fentanyl 2 mcg/mL with BUPivacaine 0.1% in sodium chloride 0.9% Epidural (has no administration in time range)   fentaNYL (SUBLIMAZE) 50 mcg/mL injection (has no administration in time range)   fentanyl 2mcg/mL with BUPivacaine 0.1% in sdoium chloride 0.9% Epidural 2 mcg/mL- 0.1 % Soln (has no administration in time range)       Medical Decision Making:   ED Management:  Dipti Villafana is 37 y.o.  at 38w0d who presents for vaginal bleeding. Pt with h/o of low lying placenta.   - About 15 cc clot evacuated on exam, with more blood in vaginal vault.   - Pt /-3 on cervical check.   - Deepest pocket found on ultrasound was 2x1.5cm.   Given patient is 38 weeks will admit for IOL 2/2 oligohydramnios and vaginal bleeding.     Ana Beckford MD  OB-GYN, PGY-1              Attending Attestation:   Physician  Attestation Statement for Resident:  As the supervising MD   Physician Attestation Statement: I have personally seen and examined this patient.   I agree with the above history. -:   As the supervising MD I agree with the above PE.    As the supervising MD I agree with the above treatment, course, plan, and disposition.  I was personally present during the critical portions of the procedure(s) performed by the resident and was immediately available in the ED to provide services and assistance as needed during the entire procedure.                         Clinical Impression:   Final diagnoses:  [Z34.90] Encounter for induction of labor          ED Disposition Condition    Send to L&D               Ana Beckford MD  Resident  08/17/22 [id number]       Pooja Gonzalez MD  08/17/22 [id number]

## 2022-08-17 NOTE — H&P
HISTORY AND PHYSICAL                                                OBSTETRICS          Subjective:       Dipti Villafana is a 37 y.o.  female with IUP at 38w0d weeks gestation who presents for IOL 2/2 to oligohydramnios.    Patient originally presented to [facility] for vaginal bleeding. Bleeding had started around 10 am this morning. She felt a gush of blood while sitting down. She put on a panty liner and presented to the [facility], with the panty liner saturated. Largest pocket on ultrasound was 2x 1.5cm.     Patient denies contractions, reports vaginal bleeding, denies LOF.   Fetal Movement: normal.    This IUP is complicated by oligohydramnios, GDM (metformin 500mg nightly), AMA, MO (BMI >40), AC 98%   .    Review of Systems   Constitutional: Negative for chills, fatigue and fever.   Eyes: Negative for visual disturbance.   Respiratory: Negative for cough, shortness of breath and wheezing.    Cardiovascular: Negative for chest pain.   Gastrointestinal: Negative for abdominal pain, diarrhea, nausea and vomiting.   Genitourinary: Positive for vaginal bleeding. Negative for flank pain, pelvic pain and vaginal dryness.   Musculoskeletal: Negative for back pain.   Neurological: Negative for syncope.   Hematological: Negative for adenopathy.   Psychiatric/Behavioral: Negative for depression.       PMHx:   Past Medical History:   Diagnosis Date    Anemia     Gestational diabetes mellitus (GDM) affecting pregnancy 2022       PSHx: No past surgical history on file.    All: Review of patient's allergies indicates:  No Known Allergies    Meds: (Not in a hospital admission)      SH:   Social History     Socioeconomic History    Marital status: Single   Tobacco Use    Smoking status: Never Smoker    Smokeless tobacco: Never Used   Substance and Sexual Activity    Alcohol use: Not Currently     Comment: occasional    Drug use: No    Sexual activity: Not Currently     Partners: Male     Birth control/protection:  None     Comment: Mirena        FH:   Family History   Problem Relation Age of Onset    Diabetes Father     Hypertension Mother     Diabetes Mother     Heart failure Sister     Breast cancer Neg Hx     Colon cancer Neg Hx     Ovarian cancer Neg Hx        OBHx:   OB History    Para Term  AB Living   3 2 2 0 0 3   SAB IAB Ectopic Multiple Live Births   0 0 0 1 3      # Outcome Date GA Lbr Jaspreet/2nd Weight Sex Delivery Anes PTL Lv   3 Current            2 Term 12   2.722 kg (6 lb) M Vag-Spont   JUAN CARLOS   1A Term 03/12/10    M    JUAN CARLOS      Birth Comments: twin   1B Term 03/12/10    M Vag-Spont  N JUAN CARLOS      Birth Comments: twin       Objective:       /73   Pulse (!) 113   Temp 98.2 °F (36.8 °C) (Oral)   Resp 20   LMP 2021   SpO2 98%     Vitals:    22 1228 22 1231 22 1247   BP:  129/83 122/73   Pulse:  (!) 113 (!) 113   Resp:  20    Temp:  98.2 °F (36.8 °C)    TempSrc:  Oral    SpO2: 98%         General:   alert, appears stated age and cooperative, no apparent distress   HENT:  normocephalic, atraumatic   Eyes:  extraocular movements and conjunctivae normal   Neck:  supple, range of motion normal, no thyromegaly   Lungs:   no respiratory distress   Heart:   regular rate   Abdomen:  soft, non-tender, non-distended but gravid, no rebound or guarding    Extremities negative edema, negative erythema   FHT: Baseline 150s, moderate BTBV, +accels, -decels;  Cat 1 (reassuring)                 TOCO: Q 3 minutes   Presentations: cephalic by ultrasound   Cervix:     Dilation: 4cm    Effacement: 50%    Station:  -3    Consistency: medium    Position: anterior/posterior and no vasa previa appreciated/low lying placenta resolved    Sterile Speculum Exam: In [facility], 15cc clot evacuated with some blood in vaginal vault    Recent Growth Scan: 3160g 63% AC 98% @36.0    Lab Review  Blood Type O POS  GBBS: negative  Rubella: Immune  RPR: neg  HIV: negative  HepB: negative        Assessment:       38w0d weeks gestation with newly diagnosed oligohydramnios. Will admit to L&D for IOL. Staff notified.     Active Hospital Problems    Diagnosis  POA    *Encounter for induction of labor [Z34.90]  Not Applicable      Resolved Hospital Problems   No resolved problems to display.          Plan:   IOL   Risks, benefits, alternatives and possible complications have been discussed in detail with the patient.   - Consents signed and to chart  - Admit to Labor and Delivery unit  - Epidural per Anesthesia  - Draw CBC, T&S  - Notify Staff  - Recheck in  hrs or PRN  - EFW 3160g 63%  - Maternal pelvis proven to 6lbs  - Given cervical check above and regular contraction pattern will proceed with pitocin    GDM:  - Home regimen: metformin 500mg nightly  - q4 glucose checks in latent labor and q2 checks in active labor     AC 98%  - pt counseled on risks of shoulder dystocia  - continue to monitor     MO (BMI >40)  - Encourage ambulation  - SCDs postpartum   - Lovenox postpartum     AMA  - neg MT21    Post-Partum Hemorrhage risk - medium      Ana Beckford MD  OB-GYN, PGY-1

## 2022-08-18 PROBLEM — Z98.891 STATUS POST PRIMARY LOW TRANSVERSE CESAREAN SECTION: Status: ACTIVE | Noted: 2022-08-18

## 2022-08-18 PROBLEM — Z34.90 ENCOUNTER FOR INDUCTION OF LABOR: Status: RESOLVED | Noted: 2022-08-17 | Resolved: 2022-08-18

## 2022-08-18 LAB
ALBUMIN SERPL BCP-MCNC: 2.1 G/DL (ref 3.5–5.2)
ALP SERPL-CCNC: 189 U/L (ref 55–135)
ALT SERPL W/O P-5'-P-CCNC: 14 U/L (ref 10–44)
ANION GAP SERPL CALC-SCNC: 10 MMOL/L (ref 8–16)
APTT BLDCRRT: 30.8 SEC (ref 21–32)
APTT BLDCRRT: 31.3 SEC (ref 21–32)
AST SERPL-CCNC: 27 U/L (ref 10–40)
BACTERIA SPEC AEROBE CULT: NORMAL
BASOPHILS # BLD AUTO: 0.01 K/UL (ref 0–0.2)
BASOPHILS # BLD AUTO: 0.03 K/UL (ref 0–0.2)
BASOPHILS NFR BLD: 0.1 % (ref 0–1.9)
BASOPHILS NFR BLD: 0.2 % (ref 0–1.9)
BILIRUB SERPL-MCNC: 1.1 MG/DL (ref 0.1–1)
BLD PROD TYP BPU: NORMAL
BLD PROD TYP BPU: NORMAL
BLOOD UNIT EXPIRATION DATE: NORMAL
BLOOD UNIT EXPIRATION DATE: NORMAL
BLOOD UNIT TYPE CODE: 5100
BLOOD UNIT TYPE CODE: 5100
BLOOD UNIT TYPE: NORMAL
BLOOD UNIT TYPE: NORMAL
BUN SERPL-MCNC: 6 MG/DL (ref 6–20)
CALCIUM SERPL-MCNC: 8.9 MG/DL (ref 8.7–10.5)
CHLORIDE SERPL-SCNC: 106 MMOL/L (ref 95–110)
CO2 SERPL-SCNC: 18 MMOL/L (ref 23–29)
CODING SYSTEM: NORMAL
CODING SYSTEM: NORMAL
CREAT SERPL-MCNC: 0.7 MG/DL (ref 0.5–1.4)
DIFFERENTIAL METHOD: ABNORMAL
DIFFERENTIAL METHOD: ABNORMAL
DISPENSE STATUS: NORMAL
DISPENSE STATUS: NORMAL
EOSINOPHIL # BLD AUTO: 0 K/UL (ref 0–0.5)
EOSINOPHIL # BLD AUTO: 0 K/UL (ref 0–0.5)
EOSINOPHIL NFR BLD: 0 % (ref 0–8)
EOSINOPHIL NFR BLD: 0.2 % (ref 0–8)
ERYTHROCYTE [DISTWIDTH] IN BLOOD BY AUTOMATED COUNT: 15.9 % (ref 11.5–14.5)
ERYTHROCYTE [DISTWIDTH] IN BLOOD BY AUTOMATED COUNT: 16.3 % (ref 11.5–14.5)
EST. GFR  (NO RACE VARIABLE): >60 ML/MIN/1.73 M^2
FIBRINOGEN PPP-MCNC: 322 MG/DL (ref 182–400)
FIBRINOGEN PPP-MCNC: 375 MG/DL (ref 182–400)
GLUCOSE SERPL-MCNC: 160 MG/DL (ref 70–110)
HCT VFR BLD AUTO: 27.1 % (ref 37–48.5)
HCT VFR BLD AUTO: 32.3 % (ref 37–48.5)
HGB BLD-MCNC: 10.3 G/DL (ref 12–16)
HGB BLD-MCNC: 8.3 G/DL (ref 12–16)
IMM GRANULOCYTES # BLD AUTO: 0.15 K/UL (ref 0–0.04)
IMM GRANULOCYTES # BLD AUTO: 0.16 K/UL (ref 0–0.04)
IMM GRANULOCYTES NFR BLD AUTO: 1.1 % (ref 0–0.5)
IMM GRANULOCYTES NFR BLD AUTO: 1.5 % (ref 0–0.5)
INR PPP: 0.9 (ref 0.8–1.2)
INR PPP: 1 (ref 0.8–1.2)
LYMPHOCYTES # BLD AUTO: 0.6 K/UL (ref 1–4.8)
LYMPHOCYTES # BLD AUTO: 1 K/UL (ref 1–4.8)
LYMPHOCYTES NFR BLD: 10 % (ref 18–48)
LYMPHOCYTES NFR BLD: 4 % (ref 18–48)
MCH RBC QN AUTO: 24.4 PG (ref 27–31)
MCH RBC QN AUTO: 25.6 PG (ref 27–31)
MCHC RBC AUTO-ENTMCNC: 30.6 G/DL (ref 32–36)
MCHC RBC AUTO-ENTMCNC: 31.9 G/DL (ref 32–36)
MCV RBC AUTO: 80 FL (ref 82–98)
MCV RBC AUTO: 80 FL (ref 82–98)
MONOCYTES # BLD AUTO: 0.8 K/UL (ref 0.3–1)
MONOCYTES # BLD AUTO: 1.1 K/UL (ref 0.3–1)
MONOCYTES NFR BLD: 10.4 % (ref 4–15)
MONOCYTES NFR BLD: 5 % (ref 4–15)
NEUTROPHILS # BLD AUTO: 13.5 K/UL (ref 1.8–7.7)
NEUTROPHILS # BLD AUTO: 8 K/UL (ref 1.8–7.7)
NEUTROPHILS NFR BLD: 77.8 % (ref 38–73)
NEUTROPHILS NFR BLD: 89.7 % (ref 38–73)
NRBC BLD-RTO: 0 /100 WBC
NRBC BLD-RTO: 0 /100 WBC
NUM UNITS TRANS PACKED RBC: NORMAL
PLATELET # BLD AUTO: 237 K/UL (ref 150–450)
PLATELET # BLD AUTO: 239 K/UL (ref 150–450)
PMV BLD AUTO: 9 FL (ref 9.2–12.9)
PMV BLD AUTO: 9.5 FL (ref 9.2–12.9)
POTASSIUM SERPL-SCNC: 4 MMOL/L (ref 3.5–5.1)
PROT SERPL-MCNC: 5.1 G/DL (ref 6–8.4)
PROTHROMBIN TIME: 10 SEC (ref 9–12.5)
PROTHROMBIN TIME: 10.5 SEC (ref 9–12.5)
RBC # BLD AUTO: 3.4 M/UL (ref 4–5.4)
RBC # BLD AUTO: 4.03 M/UL (ref 4–5.4)
SODIUM SERPL-SCNC: 134 MMOL/L (ref 136–145)
TRANS ERYTHROCYTES VOL PATIENT: NORMAL ML
WBC # BLD AUTO: 10.21 K/UL (ref 3.9–12.7)
WBC # BLD AUTO: 15.09 K/UL (ref 3.9–12.7)

## 2022-08-18 PROCEDURE — 59514 CESAREAN DELIVERY ONLY: CPT | Mod: AT,,, | Performed by: OBSTETRICS & GYNECOLOGY

## 2022-08-18 PROCEDURE — 85610 PROTHROMBIN TIME: CPT | Performed by: OBSTETRICS & GYNECOLOGY

## 2022-08-18 PROCEDURE — 88302 PR  SURG PATH,LEVEL II: ICD-10-PCS | Mod: 26,,, | Performed by: PATHOLOGY

## 2022-08-18 PROCEDURE — 58611 LIGATE OVIDUCT(S) ADD-ON: CPT | Mod: ,,, | Performed by: OBSTETRICS & GYNECOLOGY

## 2022-08-18 PROCEDURE — 25000003 PHARM REV CODE 250

## 2022-08-18 PROCEDURE — 63600175 PHARM REV CODE 636 W HCPCS

## 2022-08-18 PROCEDURE — 85025 COMPLETE CBC W/AUTO DIFF WBC: CPT | Mod: 91 | Performed by: STUDENT IN AN ORGANIZED HEALTH CARE EDUCATION/TRAINING PROGRAM

## 2022-08-18 PROCEDURE — 37000008 HC ANESTHESIA 1ST 15 MINUTES: Performed by: OBSTETRICS & GYNECOLOGY

## 2022-08-18 PROCEDURE — 85025 COMPLETE CBC W/AUTO DIFF WBC: CPT | Performed by: OBSTETRICS & GYNECOLOGY

## 2022-08-18 PROCEDURE — 63600175 PHARM REV CODE 636 W HCPCS: Performed by: STUDENT IN AN ORGANIZED HEALTH CARE EDUCATION/TRAINING PROGRAM

## 2022-08-18 PROCEDURE — 36004725 HC OB OR TIME LEV III - EA ADD 15 MIN: Mod: SZN

## 2022-08-18 PROCEDURE — 85610 PROTHROMBIN TIME: CPT | Mod: 91 | Performed by: STUDENT IN AN ORGANIZED HEALTH CARE EDUCATION/TRAINING PROGRAM

## 2022-08-18 PROCEDURE — 36004724 HC OB OR TIME LEV III - 1ST 15 MIN: Performed by: OBSTETRICS & GYNECOLOGY

## 2022-08-18 PROCEDURE — 36004725 HC OB OR TIME LEV III - EA ADD 15 MIN: Performed by: OBSTETRICS & GYNECOLOGY

## 2022-08-18 PROCEDURE — 85384 FIBRINOGEN ACTIVITY: CPT | Mod: 91 | Performed by: STUDENT IN AN ORGANIZED HEALTH CARE EDUCATION/TRAINING PROGRAM

## 2022-08-18 PROCEDURE — 63600175 PHARM REV CODE 636 W HCPCS: Performed by: OBSTETRICS & GYNECOLOGY

## 2022-08-18 PROCEDURE — 85730 THROMBOPLASTIN TIME PARTIAL: CPT | Mod: 91 | Performed by: STUDENT IN AN ORGANIZED HEALTH CARE EDUCATION/TRAINING PROGRAM

## 2022-08-18 PROCEDURE — 71000039 HC RECOVERY, EACH ADD'L HOUR: Performed by: OBSTETRICS & GYNECOLOGY

## 2022-08-18 PROCEDURE — 37000009 HC ANESTHESIA EA ADD 15 MINS: Performed by: OBSTETRICS & GYNECOLOGY

## 2022-08-18 PROCEDURE — 63600175 PHARM REV CODE 636 W HCPCS: Performed by: ANESTHESIOLOGY

## 2022-08-18 PROCEDURE — 80053 COMPREHEN METABOLIC PANEL: CPT | Performed by: STUDENT IN AN ORGANIZED HEALTH CARE EDUCATION/TRAINING PROGRAM

## 2022-08-18 PROCEDURE — 36430 TRANSFUSION BLD/BLD COMPNT: CPT

## 2022-08-18 PROCEDURE — 88302 TISSUE EXAM BY PATHOLOGIST: CPT | Mod: 26,,, | Performed by: PATHOLOGY

## 2022-08-18 PROCEDURE — 11000001 HC ACUTE MED/SURG PRIVATE ROOM

## 2022-08-18 PROCEDURE — 88302 TISSUE EXAM BY PATHOLOGIST: CPT | Performed by: PATHOLOGY

## 2022-08-18 PROCEDURE — P9016 RBC LEUKOCYTES REDUCED: HCPCS | Performed by: OBSTETRICS & GYNECOLOGY

## 2022-08-18 PROCEDURE — 25000003 PHARM REV CODE 250: Performed by: ANESTHESIOLOGY

## 2022-08-18 PROCEDURE — 59514 PR CESAREAN DELIVERY ONLY: ICD-10-PCS | Mod: AT,,, | Performed by: OBSTETRICS & GYNECOLOGY

## 2022-08-18 PROCEDURE — 58611 PR LIGATION,FALLOPIAN TUBE W/C-SECTION: ICD-10-PCS | Mod: ,,, | Performed by: OBSTETRICS & GYNECOLOGY

## 2022-08-18 PROCEDURE — 25000003 PHARM REV CODE 250: Performed by: STUDENT IN AN ORGANIZED HEALTH CARE EDUCATION/TRAINING PROGRAM

## 2022-08-18 PROCEDURE — 85730 THROMBOPLASTIN TIME PARTIAL: CPT | Performed by: OBSTETRICS & GYNECOLOGY

## 2022-08-18 PROCEDURE — 36415 COLL VENOUS BLD VENIPUNCTURE: CPT | Performed by: STUDENT IN AN ORGANIZED HEALTH CARE EDUCATION/TRAINING PROGRAM

## 2022-08-18 PROCEDURE — 37000009 HC ANESTHESIA EA ADD 15 MINS: Mod: SZN

## 2022-08-18 PROCEDURE — 71000033 HC RECOVERY, INTIAL HOUR: Performed by: OBSTETRICS & GYNECOLOGY

## 2022-08-18 PROCEDURE — 27201423 OPTIME MED/SURG SUP & DEVICES STERILE SUPPLY: Performed by: OBSTETRICS & GYNECOLOGY

## 2022-08-18 PROCEDURE — 85384 FIBRINOGEN ACTIVITY: CPT | Performed by: OBSTETRICS & GYNECOLOGY

## 2022-08-18 RX ORDER — MISOPROSTOL 200 UG/1
800 TABLET ORAL
Status: DISCONTINUED | OUTPATIENT
Start: 2022-08-18 | End: 2022-08-21 | Stop reason: HOSPADM

## 2022-08-18 RX ORDER — OXYCODONE AND ACETAMINOPHEN 10; 325 MG/1; MG/1
1 TABLET ORAL EVERY 4 HOURS PRN
Status: DISCONTINUED | OUTPATIENT
Start: 2022-08-18 | End: 2022-08-21 | Stop reason: HOSPADM

## 2022-08-18 RX ORDER — ADHESIVE BANDAGE
30 BANDAGE TOPICAL 2 TIMES DAILY PRN
Status: DISCONTINUED | OUTPATIENT
Start: 2022-08-19 | End: 2022-08-21 | Stop reason: HOSPADM

## 2022-08-18 RX ORDER — METHYLERGONOVINE MALEATE 0.2 MG/ML
200 INJECTION INTRAVENOUS
Status: DISCONTINUED | OUTPATIENT
Start: 2022-08-18 | End: 2022-08-21 | Stop reason: HOSPADM

## 2022-08-18 RX ORDER — DEXAMETHASONE SODIUM PHOSPHATE 4 MG/ML
INJECTION, SOLUTION INTRA-ARTICULAR; INTRALESIONAL; INTRAMUSCULAR; INTRAVENOUS; SOFT TISSUE
Status: DISCONTINUED | OUTPATIENT
Start: 2022-08-18 | End: 2022-08-18

## 2022-08-18 RX ORDER — EPHEDRINE SULFATE 50 MG/ML
INJECTION, SOLUTION INTRAVENOUS
Status: DISCONTINUED | OUTPATIENT
Start: 2022-08-18 | End: 2022-08-18

## 2022-08-18 RX ORDER — CARBOPROST TROMETHAMINE 250 UG/ML
250 INJECTION, SOLUTION INTRAMUSCULAR
Status: DISCONTINUED | OUTPATIENT
Start: 2022-08-18 | End: 2022-08-21 | Stop reason: HOSPADM

## 2022-08-18 RX ORDER — FENTANYL CITRATE 50 UG/ML
INJECTION, SOLUTION INTRAMUSCULAR; INTRAVENOUS
Status: DISCONTINUED | OUTPATIENT
Start: 2022-08-17 | End: 2022-08-18

## 2022-08-18 RX ORDER — ENOXAPARIN SODIUM 100 MG/ML
40 INJECTION SUBCUTANEOUS EVERY 12 HOURS
Status: DISCONTINUED | OUTPATIENT
Start: 2022-08-18 | End: 2022-08-18

## 2022-08-18 RX ORDER — ENOXAPARIN SODIUM 100 MG/ML
40 INJECTION SUBCUTANEOUS EVERY 12 HOURS
Status: DISCONTINUED | OUTPATIENT
Start: 2022-08-18 | End: 2022-08-21 | Stop reason: HOSPADM

## 2022-08-18 RX ORDER — LIDOCAINE HCL/EPINEPHRINE/PF 2%-1:200K
VIAL (ML) INJECTION
Status: DISCONTINUED | OUTPATIENT
Start: 2022-08-18 | End: 2022-08-18

## 2022-08-18 RX ORDER — ONDANSETRON 8 MG/1
8 TABLET, ORALLY DISINTEGRATING ORAL EVERY 8 HOURS PRN
Status: DISCONTINUED | OUTPATIENT
Start: 2022-08-18 | End: 2022-08-21 | Stop reason: HOSPADM

## 2022-08-18 RX ORDER — SODIUM CITRATE AND CITRIC ACID MONOHYDRATE 334; 500 MG/5ML; MG/5ML
SOLUTION ORAL
Status: DISPENSED
Start: 2022-08-18 | End: 2022-08-18

## 2022-08-18 RX ORDER — DIPHENHYDRAMINE HCL 25 MG
25 CAPSULE ORAL EVERY 4 HOURS PRN
Status: DISCONTINUED | OUTPATIENT
Start: 2022-08-18 | End: 2022-08-21 | Stop reason: HOSPADM

## 2022-08-18 RX ORDER — BISACODYL 10 MG
10 SUPPOSITORY, RECTAL RECTAL ONCE AS NEEDED
Status: DISCONTINUED | OUTPATIENT
Start: 2022-08-18 | End: 2022-08-21 | Stop reason: HOSPADM

## 2022-08-18 RX ORDER — HYDROCODONE BITARTRATE AND ACETAMINOPHEN 500; 5 MG/1; MG/1
TABLET ORAL
Status: DISCONTINUED | OUTPATIENT
Start: 2022-08-18 | End: 2022-08-21 | Stop reason: HOSPADM

## 2022-08-18 RX ORDER — IBUPROFEN 400 MG/1
800 TABLET ORAL EVERY 8 HOURS
Status: DISCONTINUED | OUTPATIENT
Start: 2022-08-19 | End: 2022-08-18

## 2022-08-18 RX ORDER — KETOROLAC TROMETHAMINE 30 MG/ML
30 INJECTION, SOLUTION INTRAMUSCULAR; INTRAVENOUS EVERY 6 HOURS
Status: DISCONTINUED | OUTPATIENT
Start: 2022-08-18 | End: 2022-08-18

## 2022-08-18 RX ORDER — DOCUSATE SODIUM 100 MG/1
200 CAPSULE, LIQUID FILLED ORAL 2 TIMES DAILY
Status: DISCONTINUED | OUTPATIENT
Start: 2022-08-18 | End: 2022-08-21 | Stop reason: HOSPADM

## 2022-08-18 RX ORDER — PHENYLEPHRINE HYDROCHLORIDE 10 MG/ML
INJECTION INTRAVENOUS
Status: DISCONTINUED | OUTPATIENT
Start: 2022-08-18 | End: 2022-08-18

## 2022-08-18 RX ORDER — HYDROMORPHONE HYDROCHLORIDE 1 MG/ML
0.2 INJECTION, SOLUTION INTRAMUSCULAR; INTRAVENOUS; SUBCUTANEOUS ONCE
Status: COMPLETED | OUTPATIENT
Start: 2022-08-18 | End: 2022-08-18

## 2022-08-18 RX ORDER — IBUPROFEN 400 MG/1
800 TABLET ORAL EVERY 8 HOURS
Status: DISCONTINUED | OUTPATIENT
Start: 2022-08-19 | End: 2022-08-21 | Stop reason: HOSPADM

## 2022-08-18 RX ORDER — SODIUM CHLORIDE, SODIUM LACTATE, POTASSIUM CHLORIDE, CALCIUM CHLORIDE 600; 310; 30; 20 MG/100ML; MG/100ML; MG/100ML; MG/100ML
INJECTION, SOLUTION INTRAVENOUS CONTINUOUS PRN
Status: DISCONTINUED | OUTPATIENT
Start: 2022-08-18 | End: 2022-08-18

## 2022-08-18 RX ORDER — AMOXICILLIN 250 MG
1 CAPSULE ORAL NIGHTLY PRN
Status: DISCONTINUED | OUTPATIENT
Start: 2022-08-18 | End: 2022-08-21 | Stop reason: HOSPADM

## 2022-08-18 RX ORDER — KETOROLAC TROMETHAMINE 30 MG/ML
30 INJECTION, SOLUTION INTRAMUSCULAR; INTRAVENOUS EVERY 6 HOURS
Status: COMPLETED | OUTPATIENT
Start: 2022-08-18 | End: 2022-08-18

## 2022-08-18 RX ORDER — FAMOTIDINE 10 MG/ML
INJECTION INTRAVENOUS
Status: DISPENSED
Start: 2022-08-18 | End: 2022-08-18

## 2022-08-18 RX ORDER — SIMETHICONE 80 MG
1 TABLET,CHEWABLE ORAL EVERY 6 HOURS PRN
Status: DISCONTINUED | OUTPATIENT
Start: 2022-08-18 | End: 2022-08-21 | Stop reason: HOSPADM

## 2022-08-18 RX ORDER — ONDANSETRON HYDROCHLORIDE 2 MG/ML
INJECTION, SOLUTION INTRAMUSCULAR; INTRAVENOUS
Status: DISCONTINUED | OUTPATIENT
Start: 2022-08-18 | End: 2022-08-18

## 2022-08-18 RX ORDER — PROCHLORPERAZINE EDISYLATE 5 MG/ML
5 INJECTION INTRAMUSCULAR; INTRAVENOUS EVERY 6 HOURS PRN
Status: DISCONTINUED | OUTPATIENT
Start: 2022-08-18 | End: 2022-08-21 | Stop reason: HOSPADM

## 2022-08-18 RX ORDER — FENTANYL CITRATE 50 UG/ML
INJECTION, SOLUTION INTRAMUSCULAR; INTRAVENOUS
Status: DISCONTINUED | OUTPATIENT
Start: 2022-08-18 | End: 2022-08-18

## 2022-08-18 RX ORDER — CHLORHEXIDINE GLUCONATE ORAL RINSE 1.2 MG/ML
10 SOLUTION DENTAL 2 TIMES DAILY
Status: DISCONTINUED | OUTPATIENT
Start: 2022-08-18 | End: 2022-08-21

## 2022-08-18 RX ORDER — SODIUM CHLORIDE 0.9 % (FLUSH) 0.9 %
10 SYRINGE (ML) INJECTION EVERY 6 HOURS PRN
Status: DISCONTINUED | OUTPATIENT
Start: 2022-08-18 | End: 2022-08-21 | Stop reason: HOSPADM

## 2022-08-18 RX ORDER — PRENATAL WITH FERROUS FUM AND FOLIC ACID 3080; 920; 120; 400; 22; 1.84; 3; 20; 10; 1; 12; 200; 27; 25; 2 [IU]/1; [IU]/1; MG/1; [IU]/1; MG/1; MG/1; MG/1; MG/1; MG/1; MG/1; UG/1; MG/1; MG/1; MG/1; MG/1
1 TABLET ORAL DAILY
Status: DISCONTINUED | OUTPATIENT
Start: 2022-08-18 | End: 2022-08-21 | Stop reason: HOSPADM

## 2022-08-18 RX ORDER — OXYCODONE AND ACETAMINOPHEN 5; 325 MG/1; MG/1
1 TABLET ORAL EVERY 4 HOURS PRN
Status: DISCONTINUED | OUTPATIENT
Start: 2022-08-18 | End: 2022-08-21 | Stop reason: HOSPADM

## 2022-08-18 RX ORDER — OXYTOCIN/RINGER'S LACTATE 30/500 ML
95 PLASTIC BAG, INJECTION (ML) INTRAVENOUS ONCE
Status: COMPLETED | OUTPATIENT
Start: 2022-08-18 | End: 2022-08-18

## 2022-08-18 RX ADMIN — PHENYLEPHRINE HYDROCHLORIDE 200 MCG: 10 INJECTION INTRAVENOUS at 02:08

## 2022-08-18 RX ADMIN — KETOROLAC TROMETHAMINE 30 MG: 30 INJECTION, SOLUTION INTRAMUSCULAR; INTRAVENOUS at 06:08

## 2022-08-18 RX ADMIN — FENTANYL CITRATE 100 MCG: 50 INJECTION, SOLUTION INTRAMUSCULAR; INTRAVENOUS at 01:08

## 2022-08-18 RX ADMIN — SODIUM CHLORIDE, SODIUM LACTATE, POTASSIUM CHLORIDE, AND CALCIUM CHLORIDE 1000 ML: .6; .31; .03; .02 INJECTION, SOLUTION INTRAVENOUS at 04:08

## 2022-08-18 RX ADMIN — ONDANSETRON 4 MG: 2 INJECTION, SOLUTION INTRAMUSCULAR; INTRAVENOUS at 02:08

## 2022-08-18 RX ADMIN — LIDOCAINE HYDROCHLORIDE,EPINEPHRINE BITARTRATE 5 MG: 20; .005 INJECTION, SOLUTION EPIDURAL; INFILTRATION; INTRACAUDAL; PERINEURAL at 01:08

## 2022-08-18 RX ADMIN — PRENATAL VIT W/ FE FUMARATE-FA TAB 27-0.8 MG 1 TABLET: 27-0.8 TAB at 09:08

## 2022-08-18 RX ADMIN — DOCUSATE SODIUM 200 MG: 100 CAPSULE, LIQUID FILLED ORAL at 08:08

## 2022-08-18 RX ADMIN — SODIUM CITRATE AND CITRIC ACID MONOHYDRATE 30 ML: 500; 334 SOLUTION ORAL at 01:08

## 2022-08-18 RX ADMIN — AZITHROMYCIN MONOHYDRATE 500 MG: 500 INJECTION, POWDER, LYOPHILIZED, FOR SOLUTION INTRAVENOUS at 01:08

## 2022-08-18 RX ADMIN — LIDOCAINE HYDROCHLORIDE,EPINEPHRINE BITARTRATE 5 MG: 20; .005 INJECTION, SOLUTION EPIDURAL; INFILTRATION; INTRACAUDAL; PERINEURAL at 02:08

## 2022-08-18 RX ADMIN — ENOXAPARIN SODIUM 40 MG: 100 INJECTION SUBCUTANEOUS at 08:08

## 2022-08-18 RX ADMIN — DOCUSATE SODIUM 200 MG: 100 CAPSULE, LIQUID FILLED ORAL at 09:08

## 2022-08-18 RX ADMIN — Medication 95 MILLI-UNITS/MIN: at 03:08

## 2022-08-18 RX ADMIN — OXYCODONE HYDROCHLORIDE AND ACETAMINOPHEN 1 TABLET: 10; 325 TABLET ORAL at 06:08

## 2022-08-18 RX ADMIN — OXYCODONE HYDROCHLORIDE AND ACETAMINOPHEN 1 TABLET: 10; 325 TABLET ORAL at 12:08

## 2022-08-18 RX ADMIN — DEXAMETHASONE SODIUM PHOSPHATE 4 MG: 4 INJECTION, SOLUTION INTRAMUSCULAR; INTRAVENOUS at 02:08

## 2022-08-18 RX ADMIN — KETOROLAC TROMETHAMINE 30 MG: 30 INJECTION, SOLUTION INTRAMUSCULAR; INTRAVENOUS at 04:08

## 2022-08-18 RX ADMIN — EPHEDRINE SULFATE 25 MG: 50 INJECTION INTRAVENOUS at 03:08

## 2022-08-18 RX ADMIN — KETOROLAC TROMETHAMINE 30 MG: 30 INJECTION, SOLUTION INTRAMUSCULAR; INTRAVENOUS at 11:08

## 2022-08-18 RX ADMIN — OXYCODONE HYDROCHLORIDE AND ACETAMINOPHEN 1 TABLET: 10; 325 TABLET ORAL at 11:08

## 2022-08-18 RX ADMIN — HYDROMORPHONE HYDROCHLORIDE 0.2 MG: 1 INJECTION, SOLUTION INTRAMUSCULAR; INTRAVENOUS; SUBCUTANEOUS at 08:08

## 2022-08-18 RX ADMIN — SODIUM CHLORIDE, SODIUM LACTATE, POTASSIUM CHLORIDE, AND CALCIUM CHLORIDE: 600; 310; 30; 20 INJECTION, SOLUTION INTRAVENOUS at 01:08

## 2022-08-18 RX ADMIN — FAMOTIDINE 20 MG: 10 INJECTION, SOLUTION INTRAVENOUS at 01:08

## 2022-08-18 RX ADMIN — OXYCODONE HYDROCHLORIDE AND ACETAMINOPHEN 1 TABLET: 10; 325 TABLET ORAL at 05:08

## 2022-08-18 NOTE — CARE UPDATE
Resident to bedside for evaluation. Patient remains /-2 with recurrent late decelerations. Patient repositioned and decelerations persisted. Not currently on pitocin. Recommend delivery via  section 2/2 fetal intolerance of labor and non-reassuring fetal status. Discussed R/B/A to proceeding with  delivery. Patient and partner in agreement to proceed. Charge nurse, staff, and anesthesia aware. Ancef/Arturo FARIAS.    Kiarra Michael M.D.  OB/GYN PGY-4

## 2022-08-18 NOTE — CARE UPDATE
Resident to bedside for prolonged deceleration to 60s for approximately 8 minutes. Unable to monitor fetal heart tones via abdominal ultrasound, placed FSE. SVE 5/80/-2, previously AROM at 1900. Blood pressures unable to be cycled. Pitocin previously at 12 mU/min, paused. Bolus IVF ongoing. Deceleration resolved with maternal repositioning.     - Will keep pitocin off for 30 minutes; and restart at 4 mU/min     Keya Baca MD/MPH  OB/GYN PGY-2  [facility]

## 2022-08-18 NOTE — CARE UPDATE
Resident to bedside for prolonged deceleration to 90s 3-4 minutes. Patient noted to be 7/80/-2 on cervical exam. Patient noted to be tom q 1-2 minutes. Terbutaline called for, but not administered. Deceleration resolved with maternal repositioning. Pitocin not currently on.     Will continue to closely monitor.     Keya Baca MD/MPH  OB/GYN PGY-2  [facility]

## 2022-08-18 NOTE — CARE UPDATE
LATE ENTRY    To bedside for recurrent late decelerations and subsequent prolonged deceleration. Patient repositioned and IVF bolus initiated. SVE 7-8/80/-2, fetal head asynclitic. FHTs returned to Cat 1 status thereafter.    Patient counseled on fetal intolerance of labor and lack of cervical change. Pitocin has been off since 0 (2nd time pitocin has been turned off). Counseled that if fetal status remains reassuring, we can attempt augmentation one additional time; however if fetus is intolerant to augmentation and patient is not making cervical change, would recommend  delivery at that time. R/b/a to  delivery were discussed briefly. All questions answered. Will CTM fetal status closely.    Kiarra Michael M.D.  OB/GYN PGY-4

## 2022-08-18 NOTE — L&D DELIVERY NOTE
[facility] - Labor & Delivery   Section   Operative Note    SUMMARY     Date of Procedure: 2022     Procedure:   1. Primary  Section via Pfannenstiel Incision  2. Bilateral Tubal Ligation via [place] Method    Indications: Fetal intolerance of labor and non-reassuring fetal status remove from delivery    Pre-operative Diagnosis:   1. IUP at 38 week 1 day pregnancy  2. Fetal intolerance of labor  3. Maternal obesity  4. A2GDM  5. Depression  6. Oligohydramnios  7. AMA  8. Undesired future fertility  9. Fetus with macrosomic growth profile    Post-operative Diagnosis:   1-9. Same  10. S/p pLTCS + BTL  11. PPH    Surgeon: Jaqui Bedolla MD    Assistants:   Keya Baca MD - PGY-2  Kiarra Michael MD - PGY-4    Anesthesia: Epidural anesthesia    Findings:    1. Single viable  female infant, with Apgars 8/9, weight 3520 grams   2. Normal appearing uterus, ovaries, and fallopian tubes  3. Normal appearing placenta  4. Large extension at R-aspect of hysterotomy into R broad ligament and R uterine artery leading to PPH; repaired and made hemostatic as described below  5. Excellent hemostasis of all tissue layers prior to closure    Estimated Blood Loss: 2285 mL    Total IV Fluids: 3000 mL, 1U pRBCs ordered to be transfused while in OR     UOP: 100 mL    Specimens: Bilateral fallopian tubal segments                  Complications:  PPH, as described below.           Disposition: PACU - hemodynamically stable.           Condition: Stable.    Procedure Details   The patient was seen in her L&D room.  delivery was recommended 2/2 fetal intolerance of labor and non-reassuring fetal status (see Care Update for details). The risks, benefits, complications, treatment options, and expected outcomes were discussed with the patient. The patient concurred with the proposed plan, giving informed consent. The patient was taken to Operating Room, identified as Dipti Villafana and the procedure verified as   Delivery with Bilateral Tubal Ligation. A Time Out was held and the above information confirmed.    After confirmation of anesthesia, the patient was prepped and draped in the usual sterile manner while placed in a dorsal supine position with a left lateral tilt. A clarke catheter had previously been placed per nursing. Preoperative antibiotics Ancef 2 g and Azithromycin 500 mg were administered. An allis test was performed to confirm adequate anesthesia. A Pfannenstiel skin incision was made and carried down through the subcutaneous tissue to the fascia. Fascial incision was made and extended transversely. The fascia was grasped with [facility] clamps and  from the underlying rectus tissue superiorly and inferiorly. The peritoneum was identified, found to be free of adherent bowel, and entered sharply with Metzenbaum scissors after tenting with hemostats. The peritoneal incision was extended longitudinally. The vesico-uterine peritoneum was identified, and bladder blade was inserted. The vesico-uterine peritoneum was incised transversely and the bladder flap was bluntly freed from the lower uterine segment. The bladder blade was reinserted to keep the bladder out of the operative field. A low transverse uterine incision was made with knife and extended with cephalocaudad traction. The infant was noted to be in cephalic presentation. The vertex was brought to the incision and elevated out of the pelvis. The patient delivered a single viable female infant without difficulty. Infant weighed 3520 grams with Apgar scores of 8/9 at one and five minutes respectively. After the umbilical cord was clamped and cut, cord blood was obtained for evaluation. The placenta was removed intact, appeared normal, and was discarded.     The uterus was exteriorized. The uterine outline, tubes and ovaries appeared normal. Active bleeding was noted from the R aspect of the hysterotomy. A large extension into the R broad  ligament and R uterine artery was identified. There was a defect in the R broad ligament with a non-expanding hematoma present. The R uterine artery was ligated via the [place] method with 1-0 chromic suture x 2. The extension was then made hemostatic with two layers of running, locked sutures of 1-0 chromic. The remainder of the uterine incision was closed with running, locked sutures of 1-0 chromic. Multiple figure-of-8 sutures of 1-0 chromic were placed along the hysterotomy and extension to ensure appropriate hemostasis. The broad ligament hematoma was stable in size with no evidence of expansion. Good hemostasis was observed along the surgical bed. Antibiotics were re-dosed at this point in the case given estimated QBL of approximately 2L. CBC and coagulation studies were also ordered and 2U pRBCs were held.    Attention was then turned to the bilateral fallopian tubes for tubal ligation procedure. The isthmic portion of the right tube was grasped in an avascular portion and elevated with a [place] clamp. A window was created in the avascular portion of the mesosalpinx using monopolar cautery. 0 plain gut suture was placed through the window and tied around the proximal and distal ends of the tube x 2 utilizing the [place] method.  Metzenbaum scissors were then used to cut above the suture both proximally and distally. Excellent hemostasis was noted. The cut portion of the tube was sent to Pathology. The left fallopian tube was then ligated in an identical fashion. Good hemostasis of the hysterotomy and extension was again confirmed. The uterus was then returned to the abdominal cavity. The abdominal cavity was swept with a moist lap to remove clots. The hysterotomy and extension were reinspected and good hemostasis was noted. Surgiflo hemostatic foam was applied to the surgical bed.    The sub-fascial tissues were inspected closely and noted to be hemostatic. The fascia was then reapproximated with running  "sutures of 1-0 PDS. The subcutaneous fat was reapproximated with 2-0 vicryl after copious irrigation, and skin was reapproximated with 4-0 monocryl in a subcuticular fashion. A pressure dressing was applied.    Instrument, sponge, and needle counts were correct prior the abdominal closure and at the conclusion of the case.     The patient tolerated procedure well and was taken to the recovery room in stable condition after the procedure with clarke catheter draining clear yellow urine.    Kiarra Michael M.D.  OB/GYN PGY-4           Delivery Information for Forrest Villafana    Birth information:  YOB: 2024   Time of birth: 2:15 AM   Sex: female   Head Delivery Date/Time: 2022  2:15 AM   Delivery type: , Low Transverse   Gestational Age: 38w1d    Delivery Providers    Delivering clinician: Jaqui Bedolla MD   Provider Role    ASHLEY Meyer RN Victoria A. Polo, MD Sydney Graham, MD             Measurements    Weight: 3520 g  Weight (lbs): 7 lb 12.2 oz  Length: 52.1 cm  Length (in): 20.5"  Head circumference: 34.9 cm  Chest circumference: 34.9 cm         Apgars    Living status: Living  Apgars:  1 min.:  5 min.:  10 min.:  15 min.:  20 min.:    Skin color:  0  1       Heart rate:  2  2       Reflex irritability:  2  2       Muscle tone:  2  2       Respiratory effort:  2  2       Total:  8  9       Apgars assigned by: TRAMAINE OSBORNE RN         Operative Delivery    Forceps attempted?: No  Vacuum extractor attempted?: No         Shoulder Dystocia    Shoulder dystocia present?: No           Presentation    Presentation: Vertex           Interventions/Resuscitation    Method: Bulb Suctioning, Deep Suctioning, Tactile Stimulation       Cord    Vessels: 3 vessels  Complications: None  Delayed Cord Clamping?: Yes  Cord Clamped Date/Time: 2022  2:16 AM  Cord Blood Disposition: Lab  Gases Sent?: No  Stem Cell Collection (by MD): No       Placenta    Placenta " delivery date/time: 2022 021  Placenta removal: Expressed  Placenta appearance: Intact  Placenta disposition: discarded           Labor Events:       labor: No     Labor Onset Date/Time:         Dilation Complete Date/Time:         Start Pushing Date/Time:         Start Pushing Date/Time:       Rupture Date/Time: 22         Rupture type:          Fluid Amount:       Fluid Color: Clear      Fluid Odor:       Membrane Status: ARM (Artificial Rupture)               steroids:       Antibiotics given for GBS:       Induction: oxytocin     Indications for induction:  Gestational Diabetic     Augmentation: amniotomy     Indications for augmentation: Ineffective Contraction Pattern     Labor complications: Fetal Intolerance     Additional complications: Vaginal Bleeding In Pregnancy, Third Trimester;Insulin Controlled White Classification A2 Gestational Diabetes Mellitus (Gdm);Obesity;Oligohydramnios        Cervical ripening:                     Delivery:      Episiotomy: None     Indication for Episiotomy:       Perineal Lacerations:   Repaired:      Periurethral Laceration:   Repaired:     Labial Laceration:   Repaired:     Sulcus Laceration:   Repaired:     Vaginal Laceration:   Repaired:     Cervical Laceration:   Repaired:     Repair suture:       Repair # of packets:       Last Value - EBL - Nursing (mL):       Sum - EBL - Nursing (mL): 0     Last Value - EBL - Anesthesia (mL):      Calculated QBL (mL): 100      Vaginal Sweep Performed:       Surgicount Correct:         Other providers:       Anesthesia    Method: Epidural, Spinal          Details (if applicable):  Trial of Labor Yes    Categorization: Primary    Priority: Urgent   Indications for : Fetal heart rate abnormalities   Incision Type: low transverse     Additional  information:  Forceps:    Vacuum:    Breech:    Observed anomalies    Other (Comments):

## 2022-08-19 LAB
BASOPHILS # BLD AUTO: 0.03 K/UL (ref 0–0.2)
BASOPHILS NFR BLD: 0.2 % (ref 0–1.9)
DIFFERENTIAL METHOD: ABNORMAL
EOSINOPHIL # BLD AUTO: 0.1 K/UL (ref 0–0.5)
EOSINOPHIL NFR BLD: 0.7 % (ref 0–8)
ERYTHROCYTE [DISTWIDTH] IN BLOOD BY AUTOMATED COUNT: 16.1 % (ref 11.5–14.5)
GLUCOSE SERPL-MCNC: 122 MG/DL (ref 70–110)
HCT VFR BLD AUTO: 27.5 % (ref 37–48.5)
HGB BLD-MCNC: 8.9 G/DL (ref 12–16)
IMM GRANULOCYTES # BLD AUTO: 0.18 K/UL (ref 0–0.04)
IMM GRANULOCYTES NFR BLD AUTO: 1.1 % (ref 0–0.5)
LYMPHOCYTES # BLD AUTO: 2 K/UL (ref 1–4.8)
LYMPHOCYTES NFR BLD: 12.5 % (ref 18–48)
MCH RBC QN AUTO: 25.5 PG (ref 27–31)
MCHC RBC AUTO-ENTMCNC: 32.4 G/DL (ref 32–36)
MCV RBC AUTO: 79 FL (ref 82–98)
MONOCYTES # BLD AUTO: 1.7 K/UL (ref 0.3–1)
MONOCYTES NFR BLD: 10.5 % (ref 4–15)
NEUTROPHILS # BLD AUTO: 11.9 K/UL (ref 1.8–7.7)
NEUTROPHILS NFR BLD: 75 % (ref 38–73)
NRBC BLD-RTO: 0 /100 WBC
PLATELET # BLD AUTO: 238 K/UL (ref 150–450)
PMV BLD AUTO: 9.3 FL (ref 9.2–12.9)
POCT GLUCOSE: 73 MG/DL (ref 70–110)
POCT GLUCOSE: 76 MG/DL (ref 70–110)
POCT GLUCOSE: 93 MG/DL (ref 70–110)
RBC # BLD AUTO: 3.49 M/UL (ref 4–5.4)
SARS-COV-2 RDRP RESP QL NAA+PROBE: NEGATIVE
WBC # BLD AUTO: 15.87 K/UL (ref 3.9–12.7)

## 2022-08-19 PROCEDURE — 25000003 PHARM REV CODE 250

## 2022-08-19 PROCEDURE — 85025 COMPLETE CBC W/AUTO DIFF WBC: CPT

## 2022-08-19 PROCEDURE — 25000003 PHARM REV CODE 250: Performed by: OBSTETRICS & GYNECOLOGY

## 2022-08-19 PROCEDURE — 11000001 HC ACUTE MED/SURG PRIVATE ROOM

## 2022-08-19 PROCEDURE — 63600175 PHARM REV CODE 636 W HCPCS

## 2022-08-19 PROCEDURE — 82947 ASSAY GLUCOSE BLOOD QUANT: CPT

## 2022-08-19 PROCEDURE — 36415 COLL VENOUS BLD VENIPUNCTURE: CPT

## 2022-08-19 PROCEDURE — U0002 COVID-19 LAB TEST NON-CDC: HCPCS | Performed by: OBSTETRICS & GYNECOLOGY

## 2022-08-19 RX ORDER — INSULIN ASPART 100 [IU]/ML
1-10 INJECTION, SOLUTION INTRAVENOUS; SUBCUTANEOUS
Status: DISCONTINUED | OUTPATIENT
Start: 2022-08-19 | End: 2022-08-20

## 2022-08-19 RX ORDER — IBUPROFEN 200 MG
24 TABLET ORAL
Status: DISCONTINUED | OUTPATIENT
Start: 2022-08-19 | End: 2022-08-20

## 2022-08-19 RX ORDER — IBUPROFEN 200 MG
16 TABLET ORAL
Status: DISCONTINUED | OUTPATIENT
Start: 2022-08-19 | End: 2022-08-20

## 2022-08-19 RX ORDER — GLUCAGON 1 MG
1 KIT INJECTION
Status: DISCONTINUED | OUTPATIENT
Start: 2022-08-19 | End: 2022-08-20

## 2022-08-19 RX ORDER — IRON POLYSACCHARIDE COMPLEX 150 MG
150 CAPSULE ORAL DAILY
Status: DISCONTINUED | OUTPATIENT
Start: 2022-08-19 | End: 2022-08-21 | Stop reason: HOSPADM

## 2022-08-19 RX ADMIN — ENOXAPARIN SODIUM 40 MG: 100 INJECTION SUBCUTANEOUS at 08:08

## 2022-08-19 RX ADMIN — Medication 150 MG: at 08:08

## 2022-08-19 RX ADMIN — IBUPROFEN 800 MG: 400 TABLET, FILM COATED ORAL at 05:08

## 2022-08-19 RX ADMIN — IBUPROFEN 800 MG: 400 TABLET, FILM COATED ORAL at 09:08

## 2022-08-19 RX ADMIN — OXYCODONE HYDROCHLORIDE AND ACETAMINOPHEN 1 TABLET: 5; 325 TABLET ORAL at 06:08

## 2022-08-19 RX ADMIN — DOCUSATE SODIUM 200 MG: 100 CAPSULE, LIQUID FILLED ORAL at 08:08

## 2022-08-19 RX ADMIN — IBUPROFEN 800 MG: 400 TABLET, FILM COATED ORAL at 02:08

## 2022-08-19 RX ADMIN — OXYCODONE HYDROCHLORIDE AND ACETAMINOPHEN 1 TABLET: 10; 325 TABLET ORAL at 02:08

## 2022-08-19 RX ADMIN — DOCUSATE SODIUM 200 MG: 100 CAPSULE, LIQUID FILLED ORAL at 09:08

## 2022-08-19 RX ADMIN — PRENATAL VIT W/ FE FUMARATE-FA TAB 27-0.8 MG 1 TABLET: 27-0.8 TAB at 08:08

## 2022-08-19 RX ADMIN — OXYCODONE HYDROCHLORIDE AND ACETAMINOPHEN 1 TABLET: 10; 325 TABLET ORAL at 05:08

## 2022-08-19 RX ADMIN — ENOXAPARIN SODIUM 40 MG: 100 INJECTION SUBCUTANEOUS at 09:08

## 2022-08-19 NOTE — NURSING
Pt complaining of sore throat and feeling feverish. Pt afebrile. Dr. Cortez notified, new orders given. Will cont to monitor.

## 2022-08-19 NOTE — PROGRESS NOTES
POSTPARTUM PROGRESS NOTE    Subjective:     PPD/POD#: 1   Procedure: Primary LTCS with BTL   EGA: 38w1d   N/V: Yes   F/C: No   Abd Pain: Mild, well-controlled with oral pain medication   Lochia: Mild   Voiding: Yes   Ambulating: Yes   Bowel fnc: Yes   Breastfeeding: Yes   Contraception: Postpartum BTL done   Circumcision: N/A, female     Objective:      Temp:  [97.6 °F (36.4 °C)-98.7 °F (37.1 °C)] 97.8 °F (36.6 °C)  Pulse:  [] 103  Resp:  [16-20] 16  SpO2:  [95 %-99 %] 98 %  BP: (113-123)/(57-66) 117/66    Lung: Normal respiratory effort   Abdomen: Soft, appropriately tender   Uterus: Firm, no fundal tenderness   Incision: Bandage in place without shadowing   : Deferred   Extremities: Bilateral trace edema     Lab Review    Recent Labs   Lab 22  0702   *   K 4.0      CO2 18*   BUN 6   CREATININE 0.7   *   PROT 5.1*   BILITOT 1.1*   ALKPHOS 189*   ALT 14   AST 27       Recent Labs   Lab 22  0259 22  0703 22  0208   WBC 10.21 15.09* 15.87*   HGB 8.3* 10.3* 8.9*   HCT 27.1* 32.3* 27.5*   MCV 80* 80* 79*    237 238         I/O    Intake/Output Summary (Last 24 hours) at 2022 0648  Last data filed at 2022 2330  Gross per 24 hour   Intake --   Output 1150 ml   Net -1150 ml        Assessment and Plan:   Postpartum care:  - Patient doing well.  - Continue routine management and advances.    PPH/Acute blood loss anemia  - H/H: pre-  >  > 1upRBCs > 10/32 > .5  - QBL: 2285mL  - asymptomatic  - iron/colace    gHTN  - BP as above  - asymptomatic  - preE labs as above  - Mag: not indicated  - Hypertensive agent not indicated    A2GDM  Gestational Diabetes  - Fasting B  - Will need 2 hour gtt at postpartum visit    Obesity  - PrePreg BMI 43  - Encourage ambulation  - Lovenox: 40 mg BID     Depression  - Mood stable  - Medications: no home meds  - Will need 1-2 week postpartum mood check      Michell Cordero MD  [facility]    [facility] PGY1

## 2022-08-19 NOTE — PHYSICIAN QUERY
PT Name: Dipti Villafana  MR #: [id number]     DOCUMENTATION CLARIFICATION     CDS/: NEELIMA Hendricks,RNC-MNN        Contact information:[email or web address]  This form is a permanent document in the medical record.    Query Date: 2022  By submitting this query, we are merely seeking further clarification of documentation. Please utilize your independent clinical judgment when addressing the question(s) below.      The Medical Record contains the following:  Indicators Supporting Clinical Findings Location in Medical Record   X Obstetrical Procedure Performed Procedure:   1. Primary  Section via Pfannenstiel Incision  2. Bilateral Tubal Ligation via [place] Method L&D Delivery note    X Procedure Detail Large extension at R-aspect of hysterotomy into R broad ligament and R uterine artery leading to PPH; repaired and made hemostatic as described below    A large extension into the R broad ligament and R uterine artery was identified. There was a defect in the R broad ligament with a non-expanding hematoma present. The R uterine artery was ligated via the [place] method with 1-0 chromic suture x 2. The extension was then made hemostatic with two layers of running, locked sutures of 1-0 chromic. The remainder of the uterine incision was closed with running, locked sutures of 1-0 chromic. Multiple figure-of-8 sutures of 1-0 chromic were placed along the hysterotomy and extension to ensure appropriate hemostasis.  L&D Delivery note     Other       Please clarify if the Uterine Extension done during  delivery is:  [  x ] Unintentional laceration/tear of uterus to facilitate delivery of fetus   [   ] Intentional to facilitate delivery of fetus   [  x ] Other clarification (please specify): ____unintentionally caused during delivery of the fetus__________   [  ] Clinically undetermined     Please document in your progress notes daily for the duration of treatment, until  resolved, and include in your discharge summary.  Form No. [id number]

## 2022-08-19 NOTE — PLAN OF CARE
Problem: Adult Inpatient Plan of Care  Goal: Plan of Care Review  Outcome: Ongoing, Progressing  Flowsheets (Taken 8/19/2022 6402)  Plan of Care Reviewed With: patient       Pt free from falls, injury or any further trauma throughout shift. VSS. Fundus midline and firm. Continued medications as ordered. Pain moderately controlled with medications. Formula feeding without difficulties. Pt in no distress. Will cont to monitor.

## 2022-08-19 NOTE — PHYSICIAN QUERY
PT Name: Dipti Villafana  MR #: [id number]  DOCUMENTATION CLARIFICATION     CDS/: NEELIMA Hendricks,RNC-MNN         Contact information:[email or web address]     This form is a permanent document in the medical record.    Query Date: 2022    By submitting this query, we are merely seeking further clarification of documentation. Please utilize your independent clinical judgment when addressing the question(s) below.    The Medical Record contains the following:  Indicators Supporting Clinical Findings Location in Medical Record   X Surgery or Procedure performed 1. Primary  Section via Pfannenstiel Incision  2. Bilateral Tubal Ligation via [place] Method L&D Delivery note    X Estimated Blood Loss (EBL) Estimated Blood Loss: 2285 mL L&D Delivery note    X Bleeding, hemorrhage, hematoma documented There was a defect in the R broad ligament with a non-expanding hematoma present. The R uterine artery was ligated via the [place] method with 1-0 chromic suture x 2. The extension was then made hemostatic with two layers of running, locked sutures of 1-0 chromic. The remainder of the uterine incision was closed with running, locked sutures of 1-0 chromic. Multiple figure-of-8 sutures of 1-0 chromic were placed along the hysterotomy and extension to ensure appropriate hemostasis. The broad ligament hematoma was stable in size with no evidence of expansion. Good hemostasis was observed along the surgical bed L&D Delivery note     Evacuation of hematoma documented      Anticoagulants/Antiplatelets/Blood thinners administration     X Transfusion 1U pRBCs ordered to be transfused while in OR L&D Delivery note     Treatment      Other       Provider, please clarify if the broad ligament hematoma is: (Check all that apply)    [   ] Inherent/integral to the procedure   [   ] Complication of the procedure   [   ] Present, but not a complication of the procedure   [  x ] Incidental  finding, not clinically significant   [   ] Due to (please specify): _______________   [   ] Other (please specify): _____________   [  ] Clinically Undetermined                              Please document in your progress notes daily for the duration of treatment, until resolved, and include in your discharge summary.    Form No. [id number]

## 2022-08-20 LAB
BILIRUBINOMETRY INDEX: NORMAL
POCT GLUCOSE: 75 MG/DL (ref 70–110)
POCT GLUCOSE: 75 MG/DL (ref 70–110)

## 2022-08-20 PROCEDURE — 99232 PR SUBSEQUENT HOSPITAL CARE,LEVL II: ICD-10-PCS | Mod: 95,,, | Performed by: OBSTETRICS & GYNECOLOGY

## 2022-08-20 PROCEDURE — 25000003 PHARM REV CODE 250

## 2022-08-20 PROCEDURE — 25000003 PHARM REV CODE 250: Performed by: OBSTETRICS & GYNECOLOGY

## 2022-08-20 PROCEDURE — 11000001 HC ACUTE MED/SURG PRIVATE ROOM

## 2022-08-20 PROCEDURE — 99232 SBSQ HOSP IP/OBS MODERATE 35: CPT | Mod: 95,,, | Performed by: OBSTETRICS & GYNECOLOGY

## 2022-08-20 PROCEDURE — 63600175 PHARM REV CODE 636 W HCPCS

## 2022-08-20 RX ORDER — IBUPROFEN 800 MG/1
800 TABLET ORAL EVERY 8 HOURS
Qty: 30 TABLET | Refills: 1 | Status: SHIPPED | OUTPATIENT
Start: 2022-08-20 | End: 2022-10-05

## 2022-08-20 RX ORDER — HYDROCODONE BITARTRATE AND ACETAMINOPHEN 5; 325 MG/1; MG/1
1 TABLET ORAL EVERY 6 HOURS PRN
Qty: 20 TABLET | Refills: 0 | Status: SHIPPED | OUTPATIENT
Start: 2022-08-20 | End: 2022-10-05

## 2022-08-20 RX ADMIN — OXYCODONE HYDROCHLORIDE AND ACETAMINOPHEN 1 TABLET: 5; 325 TABLET ORAL at 08:08

## 2022-08-20 RX ADMIN — OXYCODONE HYDROCHLORIDE AND ACETAMINOPHEN 1 TABLET: 10; 325 TABLET ORAL at 05:08

## 2022-08-20 RX ADMIN — DOCUSATE SODIUM 200 MG: 100 CAPSULE, LIQUID FILLED ORAL at 08:08

## 2022-08-20 RX ADMIN — IBUPROFEN 800 MG: 400 TABLET, FILM COATED ORAL at 02:08

## 2022-08-20 RX ADMIN — OXYCODONE HYDROCHLORIDE AND ACETAMINOPHEN 1 TABLET: 10; 325 TABLET ORAL at 12:08

## 2022-08-20 RX ADMIN — PRENATAL VIT W/ FE FUMARATE-FA TAB 27-0.8 MG 1 TABLET: 27-0.8 TAB at 08:08

## 2022-08-20 RX ADMIN — Medication 150 MG: at 08:08

## 2022-08-20 RX ADMIN — IBUPROFEN 800 MG: 400 TABLET, FILM COATED ORAL at 05:08

## 2022-08-20 RX ADMIN — ENOXAPARIN SODIUM 40 MG: 100 INJECTION SUBCUTANEOUS at 08:08

## 2022-08-20 NOTE — PROGRESS NOTES
POSTPARTUM PROGRESS NOTE    Subjective:     PPD/POD#: 2   Procedure: Primary LTCS with BTL   EGA: 38w1d   N/V: No   F/C: No   Abd Pain: Mild, well-controlled with oral pain medication   Lochia: Mild   Voiding: Yes   Ambulating: Yes   Bowel fnc: Yes   Breastfeeding: Yes   Contraception: Postpartum BTL done   Circumcision: N/A, female     Objective:      Temp:  [97.6 °F (36.4 °C)-98.7 °F (37.1 °C)] 98.1 °F (36.7 °C)  Pulse:  [] 89  Resp:  [15-18] 16  SpO2:  [96 %-98 %] 98 %  BP: (114-134)/(58-74) 131/74    Lung: Normal respiratory effort   Abdomen: Soft, appropriately tender   Uterus: Firm, no fundal tenderness   Incision: Clean, dry, and intact.  No erythema, induration, or drainage.   : Deferred   Extremities: Bilateral trace edema     Lab Review    Recent Labs   Lab 22  0702   *   K 4.0      CO2 18*   BUN 6   CREATININE 0.7   *   PROT 5.1*   BILITOT 1.1*   ALKPHOS 189*   ALT 14   AST 27       Recent Labs   Lab 22  0259 22  0703 22  0208   WBC 10.21 15.09* 15.87*   HGB 8.3* 10.3* 8.9*   HCT 27.1* 32.3* 27.5*   MCV 80* 80* 79*    237 238         I/O    Intake/Output Summary (Last 24 hours) at 2022 0617  Last data filed at 2022 0820  Gross per 24 hour   Intake --   Output 1050 ml   Net -1050 ml        Assessment and Plan:   Postpartum care:  - Patient doing well.  - Continue routine management and advances.    PPH/Acute blood loss anemia  - H/H: pre-  >  > 1upRBCs > 10/32 > .5  - QBL: 2285mL  - asymptomatic  - iron/colace    gHTN  - BP as above  - asymptomatic  - preE labs as above  - Mag: not indicated  - Hypertensive agent not indicated    A2GDM  - Fasting B  - Sliding scale insulin  - ppB 93; ppL 76; ppD 73    Obesity  - PrePreg BMI 43  - Encourage ambulation  - Lovenox: 40 mg BID     Depression  - Mood stable  - Medications: no home meds  - Will need 1-2 week postpartum mood check      Michell Cordero MD  [facility]  [facility] PGY1

## 2022-08-20 NOTE — DISCHARGE SUMMARY
Delivery Discharge Summary  Obstetrics      Primary OB Clinician: Hodan Stone MD      Admission date: 2022  Discharge date: 2022    Disposition: To home, self care    Discharge Diagnosis List:      Patient Active Problem List   Diagnosis    Depressive disorder    AMA (advanced maternal age) multigravida 35+    Morbid obesity with BMI of 40.0-44.9, adult    Mental disorder affecting pregnancy, antepartum    Obesity affecting pregnancy, antepartum    Sterilization    Vaginal bleeding    Unwanted fertility    33 weeks gestation of pregnancy    Gestational diabetes mellitus (GDM) controlled on oral hypoglycemic drug    Status post primary low transverse  section    Other immediate postpartum hemorrhage       Procedure: , due to NRFHT  + BTL 2/2 undesired fertility    Hospital Course:  Dipti Villafana is a 37 y.o. now , POD #3 who was admitted on 2022 at 38w0d for IOL 2/2 oligohydramnios. Patient was subsequently admitted to labor and delivery unit with signed consents.     Labor course was complicated by non-reassuring fetal heart tones, and decision was made to proceed with delivery via . C- section was complicated by a extension into the right broad ligament and a subsequent postpartum hemorrhage of > 2 L. Patient received 1 unit of pRBC. Coags were within normal limits x 2. She was started on iron and colace & H/H remained stable postpartum at .       Please see delivery note for further details. Her postpartum course was uncomplicated. On discharge day, patient's pain is controlled with oral pain medications. Pt is tolerating ambulation without SOB or CP, and regular diet without N/V. Reports lochia is mild. Denies any HA, vision changes, F/C, LE swelling. Denies any breast pain/soreness.    Pt in stable condition and ready for discharge. She has been instructed to start and/or continue medications and follow up with her obstetrics provider as  listed below.    Pertinent studies:  CBC  Recent Labs   Lab 22  0259 22  0703 22  0208   WBC 10.21 15.09* 15.87*   HGB 8.3* 10.3* 8.9*   HCT 27.1* 32.3* 27.5*   MCV 80* 80* 79*    237 238          Immunization History   Administered Date(s) Administered    COVID-19, vector-nr, rS-Ad26, PF ([employer]) 04/10/2021    Hepatitis B, Pediatric/Adolescent 2001, 2011    Influenza 2011    Influenza - Quadrivalent - PF *Preferred* (6 months and older) 2009, 2011, 10/01/2019    Influenza A (H1N1) 2009 Monovalent - IM - PF 2009    Tdap 2012, 2022        Delivery:    Episiotomy: None   Lacerations:     Repair suture:     Repair # of packets:     Blood loss (ml):       Birth information:  YOB: 2024   Time of birth: 2:15 AM   Sex: female   Delivery type: , Low Transverse   Gestational Age: 38w1d    Delivery Clinician:      Other providers:       Additional  information:  Forceps:    Vacuum:    Breech:    Observed anomalies      Living?:           APGARS  One minute Five minutes Ten minutes   Skin color:         Heart rate:         Grimace:         Muscle tone:         Breathing:         Totals: 8  9        Placenta: Delivered:       appearance      Patient Instructions:   Current Discharge Medication List      START taking these medications    Details   HYDROcodone-acetaminophen (NORCO) 5-325 mg per tablet Take 1 tablet by mouth every 6 (six) hours as needed for Pain.  Qty: 20 tablet, Refills: 0    Comments: Quantity prescribed more than 7 day supply? No      ibuprofen (ADVIL,MOTRIN) 800 MG tablet Take 1 tablet (800 mg total) by mouth every 8 (eight) hours.  Qty: 30 tablet, Refills: 1         CONTINUE these medications which have NOT CHANGED    Details   citalopram (CELEXA) 20 MG tablet TK 1 T PO QD  Refills: 3      FEROSUL 325 mg (65 mg iron) Tab tablet Take 1 tablet by mouth 2 (two) times daily.      hydrocortisone 1 % cream Apply  to affected area 2 times daily  Qty: 28.35 g, Refills: 1    Associated Diagnoses: Hemorrhoids during pregnancy in third trimester      ondansetron (ZOFRAN) 8 MG tablet Take 1 tablet (8 mg total) by mouth every 12 (twelve) hours as needed for Nausea.  Qty: 30 tablet, Refills: 1    Associated Diagnoses: Nausea and vomiting in pregnancy      prenat.vits,tyrone,min-iron-folic (PRENATAL VITAMIN) Tab Take 1 tablet by mouth once daily.  Qty: 30 each, Refills: 11    Associated Diagnoses: Pregnancy, unspecified gestational age      sertraline (ZOLOFT) 50 MG tablet Take 50 mg by mouth once daily.         STOP taking these medications       blood sugar diagnostic (CONTOUR TEST STRIPS) Strp Comments:   Reason for Stopping:         blood-glucose meter (ONETOUCH VERIO METER) Misc Comments:   Reason for Stopping:         lancets (ONETOUCH DELICA PLUS LANCET) 33 gauge Misc Comments:   Reason for Stopping:               Discharge Procedure Orders   Diet Adult Regular     Other restrictions (specify):   Order Comments: No heavy lifting over 5 lbs  No baths until 6 week check up. Showers only.   No driving until off narcotic pain medication/not feeling drowsy or dizzy when driving.     Pelvic Rest     Notify your health care provider if you experience any of the following:  temperature >100.4     Notify your health care provider if you experience any of the following:  persistent nausea and vomiting or diarrhea     Notify your health care provider if you experience any of the following:  severe uncontrolled pain     Notify your health care provider if you experience any of the following:  redness, tenderness, or signs of infection (pain, swelling, redness, odor or green/yellow discharge around incision site)     Notify your health care provider if you experience any of the following:  difficulty breathing or increased cough     Notify your health care provider if you experience any of the following:  severe persistent headache     Notify  your health care provider if you experience any of the following:  worsening rash     Notify your health care provider if you experience any of the following:  persistent dizziness, light-headedness, or visual disturbances     Notify your health care provider if you experience any of the following:  increased confusion or weakness     Notify your health care provider if you experience any of the following:   Order Comments: Call if you are saturating more than 2 pads an hour for more than 2 hours in a row.        Follow-up Information     Hodan Stone MD Follow up in 1 week(s).    Specialty: Obstetrics and Gynecology  Why: Blood pressure check, mood check  Contact information:  [address] [facility] [address] [phone]             Hodan Stone MD Follow up in 6 week(s).    Specialty: Obstetrics and Gynecology  Why: glucose check, postpartum visit  Contact information:  [address] [facility] [address] [phone]                          Jenelle Barahona M.D.  OB/GYN PGY-4

## 2022-08-21 VITALS
TEMPERATURE: 98 F | WEIGHT: 221.31 LBS | HEART RATE: 105 BPM | OXYGEN SATURATION: 96 % | DIASTOLIC BLOOD PRESSURE: 70 MMHG | BODY MASS INDEX: 43.45 KG/M2 | SYSTOLIC BLOOD PRESSURE: 121 MMHG | RESPIRATION RATE: 18 BRPM | HEIGHT: 60 IN

## 2022-08-21 PROCEDURE — 99232 SBSQ HOSP IP/OBS MODERATE 35: CPT | Mod: 95,,, | Performed by: OBSTETRICS & GYNECOLOGY

## 2022-08-21 PROCEDURE — 25000003 PHARM REV CODE 250

## 2022-08-21 PROCEDURE — 63600175 PHARM REV CODE 636 W HCPCS

## 2022-08-21 PROCEDURE — 25000003 PHARM REV CODE 250: Performed by: OBSTETRICS & GYNECOLOGY

## 2022-08-21 PROCEDURE — 99232 PR SUBSEQUENT HOSPITAL CARE,LEVL II: ICD-10-PCS | Mod: 95,,, | Performed by: OBSTETRICS & GYNECOLOGY

## 2022-08-21 RX ADMIN — IBUPROFEN 800 MG: 400 TABLET, FILM COATED ORAL at 10:08

## 2022-08-21 RX ADMIN — Medication 150 MG: at 09:08

## 2022-08-21 RX ADMIN — DOCUSATE SODIUM 200 MG: 100 CAPSULE, LIQUID FILLED ORAL at 09:08

## 2022-08-21 RX ADMIN — PRENATAL VIT W/ FE FUMARATE-FA TAB 27-0.8 MG 1 TABLET: 27-0.8 TAB at 09:08

## 2022-08-21 RX ADMIN — ENOXAPARIN SODIUM 40 MG: 100 INJECTION SUBCUTANEOUS at 09:08

## 2022-08-21 RX ADMIN — IBUPROFEN 800 MG: 400 TABLET, FILM COATED ORAL at 02:08

## 2022-08-21 NOTE — PLAN OF CARE
Pt VSS. Pain controlled with PO pain medication. Formula feeding well. Fundus firm and midline with scant lochia rubra. LTV incision clean & dry with sutures intact. Voiding spontaneously with adequate output. Passing gas. DCT done, patient to be discharged home. No concerns at this time.     Problem: Diabetes in Pregnancy  Goal: Blood Glucose Level Within Targeted Range  Outcome: Met     Problem:  Fall Injury Risk  Goal: Absence of Fall, Infant Drop and Related Injury  Outcome: Met     Problem: Infection  Goal: Absence of Infection Signs and Symptoms  Outcome: Met     Problem: Adult Inpatient Plan of Care  Goal: Plan of Care Review  Outcome: Met  Goal: Patient-Specific Goal (Individualized)  Outcome: Met  Goal: Absence of Hospital-Acquired Illness or Injury  Outcome: Met  Goal: Optimal Comfort and Wellbeing  Outcome: Met  Goal: Readiness for Transition of Care  Outcome: Met     Problem: Bariatric Environmental Safety  Goal: Safety Maintained with Care  Outcome: Met     Problem: Skin Injury Risk Increased  Goal: Skin Health and Integrity  Outcome: Met

## 2022-08-21 NOTE — PROGRESS NOTES
POSTPARTUM PROGRESS NOTE    Subjective:     PPD/POD#: 3   Procedure: Primary LTCS with BTL   EGA: 38w1d   N/V: No   F/C: No   Abd Pain: Mild, well-controlled with oral pain medication   Lochia: Mild   Voiding: Yes   Ambulating: Yes   Bowel fnc: Yes   Breastfeeding: Yes   Contraception: Postpartum BTL done   Circumcision: N/A, female     Objective:      Temp:  [98.1 °F (36.7 °C)-98.8 °F (37.1 °C)] 98.8 °F (37.1 °C)  Pulse:  [] 105  Resp:  [16-20] 20  SpO2:  [97 %-99 %] 97 %  BP: (125-136)/(64-78) 136/64    Lung: Normal respiratory effort   Abdomen: Soft, appropriately tender   Uterus: Firm, no fundal tenderness   Incision: Clean, dry, and intact.  No erythema, induration, or drainage.   : Deferred   Extremities: Bilateral trace edema     Lab Review    Recent Labs   Lab 22  0702   *   K 4.0      CO2 18*   BUN 6   CREATININE 0.7   *   PROT 5.1*   BILITOT 1.1*   ALKPHOS 189*   ALT 14   AST 27       Recent Labs   Lab 22  0259 22  0703 22  0208   WBC 10.21 15.09* 15.87*   HGB 8.3* 10.3* 8.9*   HCT 27.1* 32.3* 27.5*   MCV 80* 80* 79*    237 238         I/O  No intake or output data in the 24 hours ending 22 0522     Assessment and Plan:   Postpartum care:  - Patient doing well.  - Continue routine management and advances.    PPH/Acute blood loss anemia  - H/H: pre-  >  > 1upRBCs > 10/32 > .5  - QBL: 2285mL  - asymptomatic  - iron/colace    gHTN  - BP: (125-136)/(64-78) 136/64  - asymptomatic  - preE labs as above  - Mag: not indicated  - Hypertensive agent not indicated    A2GDM  - Fasting B  - Sliding scale insulin  - ppB 93; ppL 76; ppD 73    Obesity  - PrePreg BMI 43  - Encourage ambulation  - Lovenox: 40 mg BID     Depression  - Mood stable  - Medications: no home meds  - Will need 1-2 week postpartum mood check      Jenelle Barahona M.D.  OB/GYN PGY-4

## 2022-08-23 ENCOUNTER — PATIENT MESSAGE (OUTPATIENT)
Dept: OBSTETRICS AND GYNECOLOGY | Facility: CLINIC | Age: 37
End: 2022-08-23
Payer: MEDICAID

## 2022-08-23 ENCOUNTER — TELEPHONE (OUTPATIENT)
Dept: OBSTETRICS AND GYNECOLOGY | Facility: CLINIC | Age: 37
End: 2022-08-23
Payer: MEDICAID

## 2022-08-23 DIAGNOSIS — K59.01 SLOW TRANSIT CONSTIPATION: Primary | ICD-10-CM

## 2022-08-23 RX ORDER — GLYCERIN 1 G/1
1 SUPPOSITORY RECTAL
Qty: 1 SUPPOSITORY | Refills: 1 | Status: SHIPPED | OUTPATIENT
Start: 2022-08-23 | End: 2022-10-05

## 2022-08-23 NOTE — TELEPHONE ENCOUNTER
Called patient and discussed concerns. Patient denies flatulence for 2 days, has taken stool softner and drank something to help with no success. Has been taken norco for pain and having pressure and pain in abdomen. Advised to go to OB ED to be evaluated.

## 2022-08-24 ENCOUNTER — HOSPITAL ENCOUNTER (INPATIENT)
Facility: OTHER | Age: 37
LOS: 5 days | Discharge: HOME | DRG: 776 | End: 2022-08-29
Attending: OBSTETRICS & GYNECOLOGY | Admitting: OBSTETRICS & GYNECOLOGY
Payer: MEDICAID

## 2022-08-24 DIAGNOSIS — R06.02 SHORTNESS OF BREATH: ICD-10-CM

## 2022-08-24 DIAGNOSIS — R55 PRE-SYNCOPE: ICD-10-CM

## 2022-08-24 LAB
ABO + RH BLD: NORMAL
ALBUMIN SERPL BCP-MCNC: 2.5 G/DL (ref 3.5–5.2)
ALP SERPL-CCNC: 115 U/L (ref 55–135)
ALT SERPL W/O P-5'-P-CCNC: 35 U/L (ref 10–44)
ANION GAP SERPL CALC-SCNC: 12 MMOL/L (ref 8–16)
AST SERPL-CCNC: 36 U/L (ref 10–40)
BASOPHILS # BLD AUTO: 0.03 K/UL (ref 0–0.2)
BASOPHILS NFR BLD: 0.2 % (ref 0–1.9)
BILIRUB SERPL-MCNC: 0.4 MG/DL (ref 0.1–1)
BLD GP AB SCN CELLS X3 SERPL QL: NORMAL
BNP SERPL-MCNC: 213 PG/ML (ref 0–99)
BUN SERPL-MCNC: 6 MG/DL (ref 6–20)
CALCIUM SERPL-MCNC: 8.5 MG/DL (ref 8.7–10.5)
CHLORIDE SERPL-SCNC: 111 MMOL/L (ref 95–110)
CO2 SERPL-SCNC: 21 MMOL/L (ref 23–29)
CREAT SERPL-MCNC: 0.7 MG/DL (ref 0.5–1.4)
DIFFERENTIAL METHOD: ABNORMAL
EOSINOPHIL # BLD AUTO: 0.1 K/UL (ref 0–0.5)
EOSINOPHIL NFR BLD: 1.1 % (ref 0–8)
ERYTHROCYTE [DISTWIDTH] IN BLOOD BY AUTOMATED COUNT: 17 % (ref 11.5–14.5)
EST. GFR  (NO RACE VARIABLE): >60 ML/MIN/1.73 M^2
GLUCOSE SERPL-MCNC: 94 MG/DL (ref 70–110)
HCT VFR BLD AUTO: 32.3 % (ref 37–48.5)
HGB BLD-MCNC: 10.1 G/DL (ref 12–16)
IMM GRANULOCYTES # BLD AUTO: 0.42 K/UL (ref 0–0.04)
IMM GRANULOCYTES NFR BLD AUTO: 3.4 % (ref 0–0.5)
LYMPHOCYTES # BLD AUTO: 1.6 K/UL (ref 1–4.8)
LYMPHOCYTES NFR BLD: 12.7 % (ref 18–48)
MCH RBC QN AUTO: 25.1 PG (ref 27–31)
MCHC RBC AUTO-ENTMCNC: 31.3 G/DL (ref 32–36)
MCV RBC AUTO: 80 FL (ref 82–98)
MONOCYTES # BLD AUTO: 0.9 K/UL (ref 0.3–1)
MONOCYTES NFR BLD: 7.6 % (ref 4–15)
NEUTROPHILS # BLD AUTO: 9.2 K/UL (ref 1.8–7.7)
NEUTROPHILS NFR BLD: 75 % (ref 38–73)
NRBC BLD-RTO: 0 /100 WBC
PLATELET # BLD AUTO: 416 K/UL (ref 150–450)
PMV BLD AUTO: 8.7 FL (ref 9.2–12.9)
POTASSIUM SERPL-SCNC: 4.2 MMOL/L (ref 3.5–5.1)
PROT SERPL-MCNC: 5.9 G/DL (ref 6–8.4)
RBC # BLD AUTO: 4.03 M/UL (ref 4–5.4)
SODIUM SERPL-SCNC: 144 MMOL/L (ref 136–145)
TROPONIN I SERPL DL<=0.01 NG/ML-MCNC: 0.01 NG/ML (ref 0–0.03)
WBC # BLD AUTO: 12.24 K/UL (ref 3.9–12.7)

## 2022-08-24 PROCEDURE — 96375 TX/PRO/DX INJ NEW DRUG ADDON: CPT

## 2022-08-24 PROCEDURE — 63600175 PHARM REV CODE 636 W HCPCS

## 2022-08-24 PROCEDURE — 11000001 HC ACUTE MED/SURG PRIVATE ROOM

## 2022-08-24 PROCEDURE — 84484 ASSAY OF TROPONIN QUANT: CPT | Performed by: STUDENT IN AN ORGANIZED HEALTH CARE EDUCATION/TRAINING PROGRAM

## 2022-08-24 PROCEDURE — 85025 COMPLETE CBC W/AUTO DIFF WBC: CPT | Performed by: STUDENT IN AN ORGANIZED HEALTH CARE EDUCATION/TRAINING PROGRAM

## 2022-08-24 PROCEDURE — 96374 THER/PROPH/DIAG INJ IV PUSH: CPT

## 2022-08-24 PROCEDURE — 80053 COMPREHEN METABOLIC PANEL: CPT | Performed by: STUDENT IN AN ORGANIZED HEALTH CARE EDUCATION/TRAINING PROGRAM

## 2022-08-24 PROCEDURE — G0378 HOSPITAL OBSERVATION PER HR: HCPCS

## 2022-08-24 PROCEDURE — 25000003 PHARM REV CODE 250

## 2022-08-24 PROCEDURE — U0002 COVID-19 LAB TEST NON-CDC: HCPCS

## 2022-08-24 PROCEDURE — 81002 URINALYSIS NONAUTO W/O SCOPE: CPT

## 2022-08-24 PROCEDURE — 83880 ASSAY OF NATRIURETIC PEPTIDE: CPT | Performed by: STUDENT IN AN ORGANIZED HEALTH CARE EDUCATION/TRAINING PROGRAM

## 2022-08-24 PROCEDURE — 86901 BLOOD TYPING SEROLOGIC RH(D): CPT | Performed by: STUDENT IN AN ORGANIZED HEALTH CARE EDUCATION/TRAINING PROGRAM

## 2022-08-24 PROCEDURE — 99285 EMERGENCY DEPT VISIT HI MDM: CPT | Mod: 25

## 2022-08-24 RX ORDER — CITALOPRAM 20 MG/1
20 TABLET, FILM COATED ORAL DAILY
Status: DISCONTINUED | OUTPATIENT
Start: 2022-08-25 | End: 2022-08-25

## 2022-08-24 RX ORDER — LABETALOL 200 MG/1
200 TABLET, FILM COATED ORAL EVERY 12 HOURS
Status: DISCONTINUED | OUTPATIENT
Start: 2022-08-24 | End: 2022-08-29 | Stop reason: HOSPADM

## 2022-08-24 RX ORDER — SODIUM CHLORIDE, SODIUM LACTATE, POTASSIUM CHLORIDE, CALCIUM CHLORIDE 600; 310; 30; 20 MG/100ML; MG/100ML; MG/100ML; MG/100ML
INJECTION, SOLUTION INTRAVENOUS CONTINUOUS
Status: DISCONTINUED | OUTPATIENT
Start: 2022-08-24 | End: 2022-08-26

## 2022-08-24 RX ORDER — ACETAMINOPHEN 325 MG/1
650 TABLET ORAL EVERY 6 HOURS PRN
Status: DISCONTINUED | OUTPATIENT
Start: 2022-08-24 | End: 2022-08-26

## 2022-08-24 RX ORDER — MAGNESIUM SULFATE HEPTAHYDRATE 40 MG/ML
4 INJECTION, SOLUTION INTRAVENOUS ONCE
Status: COMPLETED | OUTPATIENT
Start: 2022-08-24 | End: 2022-08-24

## 2022-08-24 RX ORDER — PROCHLORPERAZINE EDISYLATE 5 MG/ML
5 INJECTION INTRAMUSCULAR; INTRAVENOUS EVERY 6 HOURS PRN
Status: DISCONTINUED | OUTPATIENT
Start: 2022-08-24 | End: 2022-08-29 | Stop reason: HOSPADM

## 2022-08-24 RX ORDER — DIPHENHYDRAMINE HYDROCHLORIDE 50 MG/ML
25 INJECTION INTRAMUSCULAR; INTRAVENOUS EVERY 4 HOURS PRN
Status: DISCONTINUED | OUTPATIENT
Start: 2022-08-24 | End: 2022-08-29 | Stop reason: HOSPADM

## 2022-08-24 RX ORDER — AMOXICILLIN 250 MG
1 CAPSULE ORAL NIGHTLY PRN
Status: DISCONTINUED | OUTPATIENT
Start: 2022-08-24 | End: 2022-08-29 | Stop reason: HOSPADM

## 2022-08-24 RX ORDER — LANOLIN ALCOHOL/MO/W.PET/CERES
1 CREAM (GRAM) TOPICAL DAILY
Status: DISCONTINUED | OUTPATIENT
Start: 2022-08-25 | End: 2022-08-29 | Stop reason: HOSPADM

## 2022-08-24 RX ORDER — SODIUM CHLORIDE 0.9 % (FLUSH) 0.9 %
10 SYRINGE (ML) INJECTION
Status: DISCONTINUED | OUTPATIENT
Start: 2022-08-24 | End: 2022-08-29 | Stop reason: HOSPADM

## 2022-08-24 RX ORDER — SERTRALINE HYDROCHLORIDE 25 MG/1
50 TABLET, FILM COATED ORAL DAILY
Status: DISCONTINUED | OUTPATIENT
Start: 2022-08-25 | End: 2022-08-25

## 2022-08-24 RX ORDER — ONDANSETRON 8 MG/1
8 TABLET, ORALLY DISINTEGRATING ORAL EVERY 8 HOURS PRN
Status: DISCONTINUED | OUTPATIENT
Start: 2022-08-24 | End: 2022-08-29 | Stop reason: HOSPADM

## 2022-08-24 RX ORDER — DOCUSATE SODIUM 100 MG/1
100 CAPSULE, LIQUID FILLED ORAL 2 TIMES DAILY
Status: DISCONTINUED | OUTPATIENT
Start: 2022-08-25 | End: 2022-08-29 | Stop reason: HOSPADM

## 2022-08-24 RX ORDER — IBUPROFEN 400 MG/1
800 TABLET ORAL EVERY 8 HOURS
Status: DISCONTINUED | OUTPATIENT
Start: 2022-08-25 | End: 2022-08-29 | Stop reason: HOSPADM

## 2022-08-24 RX ORDER — SIMETHICONE 80 MG
1 TABLET,CHEWABLE ORAL EVERY 6 HOURS PRN
Status: DISCONTINUED | OUTPATIENT
Start: 2022-08-24 | End: 2022-08-29 | Stop reason: HOSPADM

## 2022-08-24 RX ORDER — GLYCERIN 1 G/1
1 SUPPOSITORY RECTAL
Status: DISCONTINUED | OUTPATIENT
Start: 2022-08-24 | End: 2022-08-29 | Stop reason: HOSPADM

## 2022-08-24 RX ORDER — HYDROCODONE BITARTRATE AND ACETAMINOPHEN 5; 325 MG/1; MG/1
1 TABLET ORAL EVERY 6 HOURS PRN
Status: DISCONTINUED | OUTPATIENT
Start: 2022-08-24 | End: 2022-08-29 | Stop reason: HOSPADM

## 2022-08-24 RX ORDER — DIPHENHYDRAMINE HCL 25 MG
25 CAPSULE ORAL EVERY 4 HOURS PRN
Status: DISCONTINUED | OUTPATIENT
Start: 2022-08-24 | End: 2022-08-29 | Stop reason: HOSPADM

## 2022-08-24 RX ORDER — LABETALOL HYDROCHLORIDE 5 MG/ML
20 INJECTION, SOLUTION INTRAVENOUS ONCE
Status: COMPLETED | OUTPATIENT
Start: 2022-08-24 | End: 2022-08-24

## 2022-08-24 RX ORDER — MAGNESIUM SULFATE HEPTAHYDRATE 40 MG/ML
2 INJECTION, SOLUTION INTRAVENOUS CONTINUOUS
Status: DISCONTINUED | OUTPATIENT
Start: 2022-08-24 | End: 2022-08-26

## 2022-08-24 RX ORDER — CALCIUM GLUCONATE 98 MG/ML
1 INJECTION, SOLUTION INTRAVENOUS
Status: DISCONTINUED | OUTPATIENT
Start: 2022-08-24 | End: 2022-08-29 | Stop reason: HOSPADM

## 2022-08-24 RX ADMIN — SODIUM CHLORIDE, SODIUM LACTATE, POTASSIUM CHLORIDE, AND CALCIUM CHLORIDE 1000 ML: .6; .31; .03; .02 INJECTION, SOLUTION INTRAVENOUS at 11:08

## 2022-08-24 RX ADMIN — MAGNESIUM SULFATE IN WATER 2 G/HR: 40 INJECTION, SOLUTION INTRAVENOUS at 11:08

## 2022-08-24 RX ADMIN — MAGNESIUM SULFATE HEPTAHYDRATE 4 G: 40 INJECTION, SOLUTION INTRAVENOUS at 11:08

## 2022-08-24 RX ADMIN — LABETALOL HYDROCHLORIDE 20 MG: 5 INJECTION INTRAVENOUS at 09:08

## 2022-08-24 RX ADMIN — LABETALOL HYDROCHLORIDE 200 MG: 200 TABLET, FILM COATED ORAL at 11:08

## 2022-08-25 LAB
ALBUMIN SERPL BCP-MCNC: 2.1 G/DL (ref 3.5–5.2)
ALBUMIN SERPL BCP-MCNC: 2.3 G/DL (ref 3.5–5.2)
ALP SERPL-CCNC: 116 U/L (ref 55–135)
ALP SERPL-CCNC: 118 U/L (ref 55–135)
ALT SERPL W/O P-5'-P-CCNC: 25 U/L (ref 10–44)
ALT SERPL W/O P-5'-P-CCNC: 31 U/L (ref 10–44)
ANION GAP SERPL CALC-SCNC: 11 MMOL/L (ref 8–16)
ANION GAP SERPL CALC-SCNC: 12 MMOL/L (ref 8–16)
ASCENDING AORTA: 2.25 CM
AST SERPL-CCNC: 20 U/L (ref 10–40)
AST SERPL-CCNC: 26 U/L (ref 10–40)
AV INDEX (PROSTH): 0.62
AV MEAN GRADIENT: 6 MMHG
AV PEAK GRADIENT: 9 MMHG
AV VALVE AREA: 2.06 CM2
AV VELOCITY RATIO: 0.7
BILIRUB SERPL-MCNC: 0.4 MG/DL (ref 0.1–1)
BILIRUB SERPL-MCNC: 0.5 MG/DL (ref 0.1–1)
BILIRUB SERPL-MCNC: NORMAL MG/DL
BLOOD URINE, POC: NORMAL
BSA FOR ECHO PROCEDURE: 2.06 M2
BUN SERPL-MCNC: 4 MG/DL (ref 6–20)
BUN SERPL-MCNC: 6 MG/DL (ref 6–20)
CALCIUM SERPL-MCNC: 7.1 MG/DL (ref 8.7–10.5)
CALCIUM SERPL-MCNC: 7.7 MG/DL (ref 8.7–10.5)
CHLORIDE SERPL-SCNC: 104 MMOL/L (ref 95–110)
CHLORIDE SERPL-SCNC: 105 MMOL/L (ref 95–110)
CO2 SERPL-SCNC: 23 MMOL/L (ref 23–29)
CO2 SERPL-SCNC: 25 MMOL/L (ref 23–29)
COLOR, POC UA: YELLOW
CREAT SERPL-MCNC: 0.7 MG/DL (ref 0.5–1.4)
CREAT SERPL-MCNC: 0.8 MG/DL (ref 0.5–1.4)
CV ECHO LV RWT: 0.54 CM
DOP CALC AO PEAK VEL: 1.53 M/S
DOP CALC AO VTI: 33.3 CM
DOP CALC LVOT AREA: 3.3 CM2
DOP CALC LVOT DIAMETER: 2.05 CM
DOP CALC LVOT PEAK VEL: 1.07 M/S
DOP CALC LVOT STROKE VOLUME: 68.62 CM3
DOP CALC RVOT PEAK VEL: 0.8 M/S
DOP CALC RVOT VTI: 18.2 CM
DOP CALCLVOT PEAK VEL VTI: 20.8 CM
E WAVE DECELERATION TIME: 220.76 MSEC
E/A RATIO: 1.29
E/E' RATIO: 12.38 M/S
ECHO LV POSTERIOR WALL: 1.35 CM (ref 0.6–1.1)
EJECTION FRACTION: 55 %
EST. GFR  (NO RACE VARIABLE): >60 ML/MIN/1.73 M^2
EST. GFR  (NO RACE VARIABLE): >60 ML/MIN/1.73 M^2
FRACTIONAL SHORTENING: 27 % (ref 28–44)
GLUCOSE SERPL-MCNC: 114 MG/DL (ref 70–110)
GLUCOSE SERPL-MCNC: 179 MG/DL (ref 70–110)
GLUCOSE UR QL STRIP: NORMAL
INFLUENZA A, MOLECULAR: NEGATIVE
INFLUENZA B, MOLECULAR: NEGATIVE
INTERVENTRICULAR SEPTUM: 1.17 CM (ref 0.6–1.1)
IVRT: 129.4 MSEC
KETONES UR QL STRIP: NORMAL
LA MAJOR: 5.82 CM
LA MINOR: 5.49 CM
LA WIDTH: 4.5 CM
LEFT ATRIUM SIZE: 3.22 CM
LEFT ATRIUM VOLUME INDEX MOD: 39.5 ML/M2
LEFT ATRIUM VOLUME INDEX: 35.7 ML/M2
LEFT ATRIUM VOLUME MOD: 77 CM3
LEFT ATRIUM VOLUME: 69.59 CM3
LEFT INTERNAL DIMENSION IN SYSTOLE: 3.68 CM (ref 2.1–4)
LEFT VENTRICLE DIASTOLIC VOLUME INDEX: 61.4 ML/M2
LEFT VENTRICLE DIASTOLIC VOLUME: 119.73 ML
LEFT VENTRICLE MASS INDEX: 130 G/M2
LEFT VENTRICLE SYSTOLIC VOLUME INDEX: 29.5 ML/M2
LEFT VENTRICLE SYSTOLIC VOLUME: 57.56 ML
LEFT VENTRICULAR INTERNAL DIMENSION IN DIASTOLE: 5.03 CM (ref 3.5–6)
LEFT VENTRICULAR MASS: 252.78 G
LEUKOCYTE ESTERASE URINE, POC: NORMAL
LV LATERAL E/E' RATIO: 11 M/S
LV SEPTAL E/E' RATIO: 14.14 M/S
LVOT MG: 2.54 MMHG
LVOT MV: 0.76 CM/S
MAGNESIUM SERPL-MCNC: 5 MG/DL (ref 1.6–2.6)
MV PEAK A VEL: 0.77 M/S
MV PEAK E VEL: 0.99 M/S
MV STENOSIS PRESSURE HALF TIME: 64.02 MS
MV VALVE AREA P 1/2 METHOD: 3.44 CM2
NITRITE, POC UA: NORMAL
PH, POC UA: 7
PHOSPHATE SERPL-MCNC: 4.4 MG/DL (ref 2.7–4.5)
PHOSPHATE SERPL-MCNC: 4.4 MG/DL (ref 2.7–4.5)
PISA MRMAX VEL: 5.15 M/S
PISA TR MAX VEL: 2.68 M/S
POTASSIUM SERPL-SCNC: 3.4 MMOL/L (ref 3.5–5.1)
POTASSIUM SERPL-SCNC: 3.6 MMOL/L (ref 3.5–5.1)
PROT SERPL-MCNC: 5.8 G/DL (ref 6–8.4)
PROT SERPL-MCNC: 5.8 G/DL (ref 6–8.4)
PROTEIN, POC: NORMAL
PULM VEIN S/D RATIO: 1.7
PV MEAN GRADIENT: 1.52 MMHG
PV MV: 0.66 M/S
PV PEAK D VEL: 0.37 M/S
PV PEAK S VEL: 0.63 M/S
PV PEAK VELOCITY: 0.98 CM/S
RA MAJOR: 5.22 CM
RA PRESSURE: 3 MMHG
RA WIDTH: 3.6 CM
RIGHT VENTRICULAR END-DIASTOLIC DIMENSION: 3.13 CM
RV TISSUE DOPPLER FREE WALL SYSTOLIC VELOCITY 1 (APICAL 4 CHAMBER VIEW): 0.01 CM/S
SARS-COV-2 RDRP RESP QL NAA+PROBE: NEGATIVE
SINUS: 3.1 CM
SODIUM SERPL-SCNC: 140 MMOL/L (ref 136–145)
SODIUM SERPL-SCNC: 140 MMOL/L (ref 136–145)
SPECIFIC GRAVITY, POC UA: 1.01
SPECIMEN SOURCE: NORMAL
STJ: 1.87 CM
TDI LATERAL: 0.09 M/S
TDI SEPTAL: 0.07 M/S
TDI: 0.08 M/S
TR MAX PG: 29 MMHG
TRICUSPID ANNULAR PLANE SYSTOLIC EXCURSION: 2.3 CM
TV REST PULMONARY ARTERY PRESSURE: 32 MMHG
UROBILINOGEN, POC UA: NORMAL

## 2022-08-25 PROCEDURE — 63600175 PHARM REV CODE 636 W HCPCS: Performed by: STUDENT IN AN ORGANIZED HEALTH CARE EDUCATION/TRAINING PROGRAM

## 2022-08-25 PROCEDURE — 99285 PR EMERGENCY DEPT VISIT,LEVEL V: ICD-10-PCS | Mod: ,,, | Performed by: OBSTETRICS & GYNECOLOGY

## 2022-08-25 PROCEDURE — 96372 THER/PROPH/DIAG INJ SC/IM: CPT | Performed by: STUDENT IN AN ORGANIZED HEALTH CARE EDUCATION/TRAINING PROGRAM

## 2022-08-25 PROCEDURE — 83735 ASSAY OF MAGNESIUM: CPT | Performed by: STUDENT IN AN ORGANIZED HEALTH CARE EDUCATION/TRAINING PROGRAM

## 2022-08-25 PROCEDURE — 25000003 PHARM REV CODE 250: Performed by: STUDENT IN AN ORGANIZED HEALTH CARE EDUCATION/TRAINING PROGRAM

## 2022-08-25 PROCEDURE — 84100 ASSAY OF PHOSPHORUS: CPT | Mod: 91

## 2022-08-25 PROCEDURE — 25000003 PHARM REV CODE 250

## 2022-08-25 PROCEDURE — 80053 COMPREHEN METABOLIC PANEL: CPT | Mod: 91

## 2022-08-25 PROCEDURE — 11000001 HC ACUTE MED/SURG PRIVATE ROOM

## 2022-08-25 PROCEDURE — 87502 INFLUENZA DNA AMP PROBE: CPT | Performed by: STUDENT IN AN ORGANIZED HEALTH CARE EDUCATION/TRAINING PROGRAM

## 2022-08-25 PROCEDURE — 99285 EMERGENCY DEPT VISIT HI MDM: CPT | Mod: ,,, | Performed by: OBSTETRICS & GYNECOLOGY

## 2022-08-25 PROCEDURE — 63600175 PHARM REV CODE 636 W HCPCS

## 2022-08-25 PROCEDURE — 36415 COLL VENOUS BLD VENIPUNCTURE: CPT | Performed by: STUDENT IN AN ORGANIZED HEALTH CARE EDUCATION/TRAINING PROGRAM

## 2022-08-25 PROCEDURE — 36415 COLL VENOUS BLD VENIPUNCTURE: CPT

## 2022-08-25 PROCEDURE — 80053 COMPREHEN METABOLIC PANEL: CPT | Performed by: STUDENT IN AN ORGANIZED HEALTH CARE EDUCATION/TRAINING PROGRAM

## 2022-08-25 PROCEDURE — 84100 ASSAY OF PHOSPHORUS: CPT | Performed by: STUDENT IN AN ORGANIZED HEALTH CARE EDUCATION/TRAINING PROGRAM

## 2022-08-25 PROCEDURE — 51702 INSERT TEMP BLADDER CATH: CPT

## 2022-08-25 RX ORDER — POTASSIUM CHLORIDE 20 MEQ/1
40 TABLET, EXTENDED RELEASE ORAL ONCE
Status: COMPLETED | OUTPATIENT
Start: 2022-08-25 | End: 2022-08-25

## 2022-08-25 RX ORDER — ENOXAPARIN SODIUM 100 MG/ML
40 INJECTION SUBCUTANEOUS EVERY 12 HOURS
Status: DISCONTINUED | OUTPATIENT
Start: 2022-08-25 | End: 2022-08-26

## 2022-08-25 RX ORDER — BENZONATATE 100 MG/1
100 CAPSULE ORAL 3 TIMES DAILY PRN
Status: DISCONTINUED | OUTPATIENT
Start: 2022-08-25 | End: 2022-08-29 | Stop reason: HOSPADM

## 2022-08-25 RX ORDER — FUROSEMIDE 10 MG/ML
20 INJECTION INTRAMUSCULAR; INTRAVENOUS ONCE
Status: COMPLETED | OUTPATIENT
Start: 2022-08-25 | End: 2022-08-25

## 2022-08-25 RX ORDER — FUROSEMIDE 20 MG/1
20 TABLET ORAL DAILY
Status: DISCONTINUED | OUTPATIENT
Start: 2022-08-25 | End: 2022-08-25

## 2022-08-25 RX ORDER — LABETALOL HYDROCHLORIDE 5 MG/ML
20 INJECTION, SOLUTION INTRAVENOUS ONCE
Status: COMPLETED | OUTPATIENT
Start: 2022-08-25 | End: 2022-08-25

## 2022-08-25 RX ORDER — FUROSEMIDE 10 MG/ML
40 INJECTION INTRAMUSCULAR; INTRAVENOUS
Status: DISCONTINUED | OUTPATIENT
Start: 2022-08-25 | End: 2022-08-26

## 2022-08-25 RX ADMIN — IBUPROFEN 800 MG: 400 TABLET, FILM COATED ORAL at 04:08

## 2022-08-25 RX ADMIN — LABETALOL HYDROCHLORIDE 200 MG: 200 TABLET, FILM COATED ORAL at 08:08

## 2022-08-25 RX ADMIN — ENOXAPARIN SODIUM 40 MG: 100 INJECTION SUBCUTANEOUS at 08:08

## 2022-08-25 RX ADMIN — DOCUSATE SODIUM 100 MG: 100 CAPSULE, LIQUID FILLED ORAL at 09:08

## 2022-08-25 RX ADMIN — LABETALOL HYDROCHLORIDE 200 MG: 200 TABLET, FILM COATED ORAL at 09:08

## 2022-08-25 RX ADMIN — IBUPROFEN 800 MG: 400 TABLET, FILM COATED ORAL at 07:08

## 2022-08-25 RX ADMIN — MAGNESIUM SULFATE IN WATER 2 G/HR: 40 INJECTION, SOLUTION INTRAVENOUS at 04:08

## 2022-08-25 RX ADMIN — FUROSEMIDE 40 MG: 10 INJECTION, SOLUTION INTRAMUSCULAR; INTRAVENOUS at 08:08

## 2022-08-25 RX ADMIN — FERROUS SULFATE TAB 325 MG (65 MG ELEMENTAL FE) 1 EACH: 325 (65 FE) TAB at 08:08

## 2022-08-25 RX ADMIN — SODIUM CHLORIDE, SODIUM LACTATE, POTASSIUM CHLORIDE, AND CALCIUM CHLORIDE 1000 ML: .6; .31; .03; .02 INJECTION, SOLUTION INTRAVENOUS at 01:08

## 2022-08-25 RX ADMIN — ACETAMINOPHEN 650 MG: 325 TABLET, FILM COATED ORAL at 07:08

## 2022-08-25 RX ADMIN — ENOXAPARIN SODIUM 40 MG: 100 INJECTION SUBCUTANEOUS at 09:08

## 2022-08-25 RX ADMIN — LABETALOL HYDROCHLORIDE 20 MG: 5 INJECTION INTRAVENOUS at 04:08

## 2022-08-25 RX ADMIN — POTASSIUM CHLORIDE 40 MEQ: 1500 TABLET, EXTENDED RELEASE ORAL at 11:08

## 2022-08-25 RX ADMIN — FUROSEMIDE 20 MG: 10 INJECTION INTRAMUSCULAR; INTRAVENOUS at 02:08

## 2022-08-25 RX ADMIN — FUROSEMIDE 40 MG: 10 INJECTION, SOLUTION INTRAMUSCULAR; INTRAVENOUS at 09:08

## 2022-08-25 RX ADMIN — BENZONATATE 100 MG: 100 CAPSULE ORAL at 03:08

## 2022-08-25 RX ADMIN — DOCUSATE SODIUM 100 MG: 100 CAPSULE, LIQUID FILLED ORAL at 08:08

## 2022-08-25 NOTE — PROGRESS NOTES
Call received from Dr. Valles. MD states patient has a headache and ordered to give PRN tylenol. MD also states to lower LR to 50ml/hr.

## 2022-08-25 NOTE — H&P
Consult Note  Obstetrics     SUBJECTIVE:     History of Present Illness:  Dipti Villafana is a 37 y.o. [id number] POD#6 s/p pLTCS and BTL who presented to the OB ED with complaints of acute onset of shortness of breath since earlier this afternoon and subsequently diagnosed with pre-eclampsia with severe features 2/2 sustained severely elevated blood pressures and pulmonary edema. Patient denies headache with vision changes, chest pain, palpitations, RUQ pain, increased lower limb swelling/ pain/ erythema.     Of note, prior to discharge, patient was diagnosed with gHTN; however, did not require magnesium or anti-hypertensive medication. Additionally, patient had postpartum hemorrhage that required 1 unit of pRBCs prior to discharge.     This IUP was complicated by gHTN, maternal obesity, depression, and AMA.      Review of patient's allergies indicates:  No Known Allergies  Past Medical History:   Diagnosis Date    Anemia     Gestational diabetes mellitus (GDM) affecting pregnancy 2022    Pre-eclampsia in postpartum period 2022     Past Surgical History:   Procedure Laterality Date     SECTION WITH TUBAL LIGATION N/A 2022    Procedure:  SECTION, WITH TUBAL LIGATION;  Surgeon: Jaqui Bedolla MD;  Location: [facility] L&D;  Service: OB/GYN;  Laterality: N/A;     OB History        3    Para   3    Term   3       0    AB   0    Living   4       SAB   0    IAB   0    Ectopic   0    Multiple   1    Live Births   4               Family History   Problem Relation Age of Onset    Diabetes Father     Hypertension Mother     Diabetes Mother     Heart failure Sister     Breast cancer Neg Hx     Colon cancer Neg Hx     Ovarian cancer Neg Hx      Social History     Socioeconomic History    Marital status: Single   Tobacco Use    Smoking status: Never Smoker    Smokeless tobacco: Never Used   Substance and Sexual Activity    Alcohol use: Not Currently     Comment: occasional     Drug use: No    Sexual activity: Not Currently     Partners: Male     Birth control/protection: None     Comment: Mirena 2012     Current Facility-Administered Medications   Medication    acetaminophen tablet 650 mg    calcium gluconate 100 mg/mL (10%) injection 1 g    citalopram tablet 20 mg    diphenhydrAMINE capsule 25 mg    diphenhydrAMINE injection 25 mg    docusate sodium capsule 100 mg    ferrous sulfate tablet 1 each    furosemide injection 20 mg    furosemide tablet 20 mg    glycerin adult suppository 1 suppository    HYDROcodone-acetaminophen 5-325 mg per tablet 1 tablet    ibuprofen tablet 800 mg    labetaloL tablet 200 mg    lactated ringers infusion    magnesium sulfate in water 40 gram/1,000 mL (4 %) infusion    ondansetron disintegrating tablet 8 mg    prochlorperazine injection Soln 5 mg    senna-docusate 8.6-50 mg per tablet 1 tablet    sertraline tablet 50 mg    simethicone chewable tablet 80 mg    sodium chloride 0.9% flush 10 mL       Review of Systems:  Constitutional: no significant weight change, fever, fatigue  Eyes:  No vision changes  Cardiovascular: No chest pain  Respiratory: + shortness of breath  Gastrointestinal: No diarrhea, bloody stool, nausea/vomiting, constipation  Genitourinary: No blood in urine, painful urination, urgency of urination, frequency of urination  Skin/Breast: No painful breasts, nipple discharge, masses  Neurological: No headache  Endocrine: No hot flushes  Psychiatric: No depression or anxiety     OBJECTIVE:     Vital Signs  Temp:  [98.2 °F (36.8 °C)] 98.2 °F (36.8 °C)  Pulse:  [] 92  SpO2:  [85 %-100 %] 96 %  BP: (132-174)/(74-95) 132/79    Physical Exam:  Gen: A&Ox3, NAD, obese  CV: sinus tachycardia, no murmurs auscultated, normal S1/S2    Pulm: Decreased breath sounds in right lower lobe, inspiratory/ expiratory wheezing in right upper/lower, left lung clear to auscultation   Abd: Soft, non-distended, tenderness consistent with  postoperative pain, no rebound or guarding; incision clean, dry, intact, non-erythematous     Ext: PPP, no peripheral edema  Pelvic: deferred     Laboratory  Recent Results (from the past 24 hour(s))   CBC auto differential    Collection Time: 08/24/22  9:50 PM   Result Value Ref Range    WBC 12.24 3.90 - 12.70 K/uL    RBC 4.03 4.00 - 5.40 M/uL    Hemoglobin 10.1 (L) 12.0 - 16.0 g/dL    Hematocrit 32.3 (L) 37.0 - 48.5 %    MCV 80 (L) 82 - 98 fL    MCH 25.1 (L) 27.0 - 31.0 pg    MCHC 31.3 (L) 32.0 - 36.0 g/dL    RDW 17.0 (H) 11.5 - 14.5 %    Platelets 416 150 - 450 K/uL    MPV 8.7 (L) 9.2 - 12.9 fL    Immature Granulocytes 3.4 (H) 0.0 - 0.5 %    Gran # (ANC) 9.2 (H) 1.8 - 7.7 K/uL    Immature Grans (Abs) 0.42 (H) 0.00 - 0.04 K/uL    Lymph # 1.6 1.0 - 4.8 K/uL    Mono # 0.9 0.3 - 1.0 K/uL    Eos # 0.1 0.0 - 0.5 K/uL    Baso # 0.03 0.00 - 0.20 K/uL    nRBC 0 0 /100 WBC    Gran % 75.0 (H) 38.0 - 73.0 %    Lymph % 12.7 (L) 18.0 - 48.0 %    Mono % 7.6 4.0 - 15.0 %    Eosinophil % 1.1 0.0 - 8.0 %    Basophil % 0.2 0.0 - 1.9 %    Differential Method Automated    Comprehensive Metabolic Panel    Collection Time: 08/24/22  9:50 PM   Result Value Ref Range    Sodium 144 136 - 145 mmol/L    Potassium 4.2 3.5 - 5.1 mmol/L    Chloride 111 (H) 95 - 110 mmol/L    CO2 21 (L) 23 - 29 mmol/L    Glucose 94 70 - 110 mg/dL    BUN 6 6 - 20 mg/dL    Creatinine 0.7 0.5 - 1.4 mg/dL    Calcium 8.5 (L) 8.7 - 10.5 mg/dL    Total Protein 5.9 (L) 6.0 - 8.4 g/dL    Albumin 2.5 (L) 3.5 - 5.2 g/dL    Total Bilirubin 0.4 0.1 - 1.0 mg/dL    Alkaline Phosphatase 115 55 - 135 U/L    AST 36 10 - 40 U/L    ALT 35 10 - 44 U/L    Anion Gap 12 8 - 16 mmol/L    eGFR >60 >60 mL/min/1.73 m^2   Troponin I    Collection Time: 08/24/22  9:50 PM   Result Value Ref Range    Troponin I 0.010 0.000 - 0.026 ng/mL   Brain natriuretic peptide    Collection Time: 08/24/22  9:50 PM   Result Value Ref Range     (H) 0 - 99 pg/mL   Type & Screen    Collection Time:  22  9:50 PM   Result Value Ref Range    Group & Rh O POS     Indirect Keven NEG    COVID-19 Rapid Screening    Collection Time: 22 11:26 PM   Result Value Ref Range    SARS-CoV-2 RNA, Amplification, Qual Negative Negative   POCT urinalysis, dipstick or tablet reag    Collection Time: 22 12:20 AM   Result Value Ref Range    Color, UA Yellow     Spec Grav UA 1.010     pH, UA 7     WBC, UA neg     Nitrite, UA neg     Protein, POC neg     Glucose, UA norm     Ketones, UA neg     Urobilinogen, UA norm     Bilirubin, POC neg     Blood, UA about 250        Diagnostic Results:  CLINICAL HISTORY:  Shortness of breath     TECHNIQUE:  Single frontal view of the chest was performed.     COMPARISON:  May 2018.     FINDINGS:  Cardiac silhouette is normal in size.  Diffuse asymmetric opacification and consolidative changes are seen throughout the right lung, more pronounced in the right lower lung zone.  Left lung is relatively clear.  No pneumothorax.  No significant pleural effusion seen.     Impression:     Diffuse opacification/consolidation throughout the right lung which is most suggestive for pneumonia.  Asymmetric severe right sided pulmonary edema could present with similar appearance.    ASSESSMENT/PLAN:     Active Hospital Problems    Diagnosis  POA    Pre-eclampsia in postpartum period [O14.95]  Unknown      Resolved Hospital Problems   No resolved problems to display.       Dipti Villafana is a 37 y.o.  POD#6 s/p pLTCS and BTL who presents with acute shortness of breath and severely elevated blood pressure and diagnosed with pre-eclampsia with severe features.     1. Pre-Eclampsia with Severe Features:   - Tachycardia, tachypnea, and severely elevated BP while in OB ED, afebrile   - O2 saturation initially 85-92%; however, pulse ox replaced on ear lobe and O2 saturation %   - Pulmonary Exam: decreased breath sounds in right-lower lobe, inspiratory/ expiratory wheezing auscultated in  upper/lower right lobes, left lung clear to auscultation   - Abdominal Exam: tenderness appropriate for post-operative status, no rebound or guarding, incision clean, dry, intact with no signs of infection or erythema    - CBC: 10/32/416   - CMP: K 4.2, Cr 0.7, AST/ALT 35/12   - Troponin: negative   - BNP: 213   - UA: negative   - Covid: negative   - Influenza: pending   - CXR: Diffuse opacification/ consolidation throughout right lung suggestive of pneumonia, asymmetric severe right sided pulmonary edema.   - In the setting of history of gestational hypertension in the postpartum period, sustained severely elevated blood pressures, and acute worsening shortness of breath with imaging suggestive of pulmonary edema; high clinical suspicion patient has developed pre-eclampsia with severe features, severely elevated blood pressures and pulmonary edema. Low clinical suspicion for PE at this time as tachycardia resolved and patient has no acute chest pain or evidence of DVT.Will admit to [facility] for inpatient management for blood pressure control and diuresis. Will administer magnesium for seizure prophylaxis for 24-hours.   - Patient on supplement O2- 2L NC prior to leaving OB ED. Will attempt to ween O2 as patient is diuresed.   - Patient endorses cough, will give tesslon pearls; low suspicion for infectious process 2/2 no leukocytosis and afebrile.   - Received labetalol 20 mg x1 in OB ED, blood pressure responded appropriately in mild range.   - Started on labetalol 200 mg BID; will continue to closely monitor blood pressures and adjust dosing as needed   - Magnesium for seizure prophylaxis, will continue for 24-hours   - Lasix 20 mg PO daily for diuresis in the setting of pulmonary edema; will continue to closely monitor    - Strict Is and Os to monitor diuresis   - Echocardiogram to be placed STAT for tomorrow AM     2. Maternal Obesity:   - Patient received appropriate prophylactic anti-coagulation while postpartum  inpatient, will continue Lovenox 40 mg BID while inpatient   - SCDs in place  - Encourage ambulation when able to     3. Depression:   - No home meds to continue   - Scheduled for postpartum mood check on 09/02/2022     4. POD#6 s/p pLTCS and BTL:   - Continue postpartum management   - Patient previously seen in OB ED for constipation management; will continue glycerin suppository and senna-docusate.   - Patient not breast feeding.     Keya Baca MD/MPH  OB/GYN PGY-2  [facility]

## 2022-08-25 NOTE — PROGRESS NOTES
08/25/22 0205   Vital Signs   Pulse 98   SpO2 (!) 86 %     Patient called out stating that her monitor was alarming. RN to bedside. O2 sats 86% on 2L NC. Charge RN and Dr. Alvarez called to bedside. NC removed and patient placed on 10L nonrebreather. MD states that Dr. Michael placed order for 20mg lasix IV.

## 2022-08-25 NOTE — ED PROVIDER NOTES
Encounter Date: 2022       History     Chief Complaint   Patient presents with    Shortness of Breath    Cough    Hypertension     HPI   Dipti Villafana is a 37 y.o. [id number] POD#6 s/p pLTCS and BTL who presented to the OB ED with complaints of acute onset of shortness of breath since earlier this afternoon and subsequently diagnosed with pre-eclampsia with severe features 2/2 sustained severely elevated blood pressures and pulmonary edema. Patient denies headache with vision changes, chest pain, palpitations, RUQ pain, increased lower limb swelling/ pain/ erythema.      Of note, prior to discharge, patient was diagnosed with gHTN; however, did not require magnesium or anti-hypertensive medication. Additionally, patient had postpartum hemorrhage that required 1 unit of pRBCs prior to discharge.      This IUP was complicated by gHTN, maternal obesity, depression, and AMA.    Review of patient's allergies indicates:  No Known Allergies  Past Medical History:   Diagnosis Date    Anemia     Gestational diabetes mellitus (GDM) affecting pregnancy 2022    Pre-eclampsia in postpartum period 2022     Past Surgical History:   Procedure Laterality Date     SECTION WITH TUBAL LIGATION N/A 2022    Procedure:  SECTION, WITH TUBAL LIGATION;  Surgeon: Jaqui Bedolla MD;  Location: [facility] L&D;  Service: OB/GYN;  Laterality: N/A;     Family History   Problem Relation Age of Onset    Diabetes Father     Hypertension Mother     Diabetes Mother     Heart failure Sister     Breast cancer Neg Hx     Colon cancer Neg Hx     Ovarian cancer Neg Hx      Social History     Tobacco Use    Smoking status: Never    Smokeless tobacco: Never   Substance Use Topics    Alcohol use: Not Currently     Comment: occasional    Drug use: No     Review of Systems   Constitutional:  Positive for fatigue. Negative for chills and fever.   Eyes:  Negative for photophobia and visual disturbance.   Respiratory:  Positive for  cough, chest tightness, shortness of breath and wheezing.    Cardiovascular:  Negative for chest pain, palpitations and leg swelling.   Gastrointestinal:  Negative for abdominal pain, nausea and vomiting.   Genitourinary:  Negative for difficulty urinating, dysuria, flank pain, frequency, hematuria, pelvic pain, urgency, vaginal bleeding, vaginal discharge and vaginal pain.   Neurological:  Negative for headaches.     Physical Exam     Initial Vitals   BP Pulse Resp Temp SpO2   08/24/22 2115 08/24/22 2115 08/25/22 0058 08/24/22 2316 08/24/22 2115   (!) 173/94 (!) 118 (!) 28 98.2 °F (36.8 °C) (!) 85 %      MAP       --                Physical Exam    Vitals reviewed.  Constitutional: She appears well-developed and well-nourished.   HENT:   Head: Normocephalic.   Eyes: Conjunctivae are normal. Pupils are equal, round, and reactive to light.   Neck:   Normal range of motion.  Cardiovascular:  S1 normal and S2 normal.   Tachycardia present.         Pulmonary/Chest: Effort normal. Tachypnea noted. No respiratory distress. She has decreased breath sounds in the right upper field and the right lower field. She has wheezes in the right upper field and the right lower field. She has no rhonchi. She has no rales.   Abdominal: Abdomen is soft. There is no abdominal tenderness.   Incision clean, dry, intact    No right CVA tenderness.  No left CVA tenderness. There is no rebound and no guarding.   Musculoskeletal:         General: Edema present. Normal range of motion.      Cervical back: Normal range of motion.     Neurological: She is alert and oriented to person, place, and time.   Skin: Skin is warm and dry. Capillary refill takes less than 2 seconds.   Psychiatric: She has a normal mood and affect. Her behavior is normal. Judgment and thought content normal.       ED Course   Procedures  Labs Reviewed   CBC W/ AUTO DIFFERENTIAL - Abnormal; Notable for the following components:       Result Value    Hemoglobin 10.1 (*)      Hematocrit 32.3 (*)     MCV 80 (*)     MCH 25.1 (*)     MCHC 31.3 (*)     RDW 17.0 (*)     MPV 8.7 (*)     Immature Granulocytes 3.4 (*)     Gran # (ANC) 9.2 (*)     Immature Grans (Abs) 0.42 (*)     Gran % 75.0 (*)     Lymph % 12.7 (*)     All other components within normal limits   COMPREHENSIVE METABOLIC PANEL - Abnormal; Notable for the following components:    Chloride 111 (*)     CO2 21 (*)     Calcium 8.5 (*)     Total Protein 5.9 (*)     Albumin 2.5 (*)     All other components within normal limits   B-TYPE NATRIURETIC PEPTIDE - Abnormal; Notable for the following components:     (*)     All other components within normal limits   TROPONIN I   SARS-COV-2 RNA AMPLIFICATION, QUAL   POCT URINALYSIS, DIPSTICK OR TABLET REAGENT, AUTOMATED, WITH MICROSCOP   TYPE & SCREEN          Imaging Results              X-Ray Chest AP Portable (Final result)  Result time 08/24/22 22:20:31      Final result by Armando Kam MD (08/24/22 22:20:31)                   Impression:      Diffuse opacification/consolidation throughout the right lung which is most suggestive for pneumonia.  Asymmetric severe right sided pulmonary edema could present with similar appearance.      Electronically signed by: Armando Kam MD  Date:    08/24/2022  Time:    22:20               Narrative:    EXAMINATION:  XR CHEST AP PORTABLE    CLINICAL HISTORY:  Shortness of breath    TECHNIQUE:  Single frontal view of the chest was performed.    COMPARISON:  May 2018.    FINDINGS:  Cardiac silhouette is normal in size.  Diffuse asymmetric opacification and consolidative changes are seen throughout the right lung, more pronounced in the right lower lung zone.  Left lung is relatively clear.  No pneumothorax.  No significant pleural effusion seen.                                       Medications   calcium gluconate 100 mg/mL (10%) injection 1 g (has no administration in time range)   docusate sodium capsule 100 mg (100 mg Oral Given 8/26/22  2050)   ferrous sulfate tablet 1 each (1 each Oral Given 8/26/22 0925)   glycerin adult suppository 1 suppository (has no administration in time range)   ibuprofen tablet 800 mg (800 mg Oral Given 8/26/22 2050)   HYDROcodone-acetaminophen 5-325 mg per tablet 1 tablet (has no administration in time range)   sodium chloride 0.9% flush 10 mL (has no administration in time range)   ondansetron disintegrating tablet 8 mg (has no administration in time range)   prochlorperazine injection Soln 5 mg (has no administration in time range)   senna-docusate 8.6-50 mg per tablet 1 tablet (has no administration in time range)   simethicone chewable tablet 80 mg (has no administration in time range)   diphenhydrAMINE capsule 25 mg (has no administration in time range)   diphenhydrAMINE injection 25 mg (has no administration in time range)   labetaloL tablet 200 mg (200 mg Oral Given 8/26/22 2050)   benzonatate capsule 100 mg (100 mg Oral Given 8/25/22 0320)   piperacillin-tazobactam 4.5 g in dextrose 5 % 100 mL IVPB (ready to mix system) (4.5 g Intravenous New Bag 8/27/22 0019)   lactated ringers infusion ( Intravenous Verify Only 8/26/22 1656)   enoxaparin injection 40 mg (40 mg Subcutaneous Given 8/26/22 2050)   furosemide injection 20 mg (20 mg Intravenous Given 8/26/22 0928)   labetaloL injection 20 mg (20 mg Intravenous Given 8/24/22 2150)   magnesium sulfate in water 40 gram/1,000 mL (4 %) bolus from bag 4 g (4 g Intravenous Bolus from Bag 8/24/22 2301)   furosemide injection 20 mg (20 mg Intravenous Given 8/25/22 0218)   furosemide injection 20 mg (20 mg Intravenous Given 8/25/22 0223)   labetaloL injection 20 mg (20 mg Intravenous Given 8/25/22 0427)   potassium chloride SA CR tablet 40 mEq (40 mEq Oral Given 8/25/22 2328)   acetaminophen tablet 1,000 mg (1,000 mg Oral Given 8/26/22 0639)   iohexoL (OMNIPAQUE 350) injection 100 mL (100 mLs Intravenous Given 8/26/22 1000)     Medical Decision Making:   ED Management:  -  Tachycardia, tachypnea, and severely elevated BP while in OB ED, afebrile   - O2 saturation initially 85-92%; however, pulse ox replaced on ear lobe and O2 saturation %   - Pulmonary Exam: decreased breath sounds in right-lower lobe, inspiratory/ expiratory wheezing auscultated in upper/lower right lobes, left lung clear to auscultation   - Abdominal Exam: tenderness appropriate for post-operative status, no rebound or guarding, incision clean, dry, intact with no signs of infection or erythema    - CBC: 10/32/416   - CMP: K 4.2, Cr 0.7, AST/ALT 35/12   - Troponin: negative   - BNP: 213   - UA: negative   - Covid: negative   - Influenza: pending   - CXR: Diffuse opacification/ consolidation throughout right lung suggestive of pneumonia, asymmetric severe right sided pulmonary edema.   - In the setting of history of gestational hypertension in the postpartum period, sustained severely elevated blood pressures, and acute worsening shortness of breath with imaging suggestive of pulmonary edema; high clinical suspicion patient has developed pre-eclampsia with severe features, severely elevated blood pressures and pulmonary edema. Low clinical suspicion for PE at this time as tachycardia resolved and patient has no acute chest pain or evidence of DVT.Will admit to [facility] for inpatient management for blood pressure control and diuresis. Will administer magnesium for seizure prophylaxis for 24-hours.   - Patient on supplement O2- 2L NC  - Patient endorses cough, will give tesslon pearls; low suspicion for infectious process 2/2 no leukocytosis and afebrile.   - Received labetalol 20 mg; blood pressure responded appropriately in mild range.   - Started on labetalol 200 mg BID; will continue to closely monitor blood pressures and adjust dosing as needed   - Magnesium for seizure prophylaxis, will continue for 24-hours   - Lasix 20 mg PO daily for diuresis in the setting of pulmonary edema; will continue to closely monitor       - Discussed with OB ED staff, patient stable for transfer for [facility].   Other:   I discussed test(s) with the performing physician.                    Clinical Impression:   Final diagnoses:  [O14.95] Pre-eclampsia in postpartum period (Primary)        ED Disposition Condition    Observation                 Keya Baca MD  Resident  08/27/22 9327

## 2022-08-25 NOTE — PROGRESS NOTES
RN inquired about patient's feeding plan for infant and if patient would like a breastpump. Patient states she is not breastfeeding.

## 2022-08-25 NOTE — PROGRESS NOTES
Notified Dr. Alvarez of patient's admit RR- 28. MD states this is similar to RR in the AUNG. No new orders at this time.

## 2022-08-25 NOTE — PLAN OF CARE
POC reviewed with patient, verbalized understanding. Fundus firm and bleeding scant to light. No complaints of pain throughout the shift. Voiding per bedside commode. O2 weaned to 5L overnight. MAG and LR continued overnight.

## 2022-08-25 NOTE — PROGRESS NOTES
Dr. Alvarez at bedside. MD states spoke with Dr. Michael and MD wants additional dose of 20mg of lasix at this time.

## 2022-08-25 NOTE — CARE UPDATE
Resident to bedside after being notified by nursing staff of O2 desaturations to 86% with RR 28. Patient previously on 2L NC but then had desaturations, titrated up to 10L facemask per nursing. Patient endorses increased work of breathing but denies any acute CP. Denies any additional changes in symptoms since admission.    Temp:  [98.2 °F (36.8 °C)-98.6 °F (37 °C)] 98.6 °F (37 °C)  Pulse:  [] 95  Resp:  [28] 28  SpO2:  [85 %-100 %] 99 %  BP: (132-174)/(74-95) 139/77    PE:  Gen: Resting comfortably in bed  CV: Mild tachycardia to 100s, no murmurs  Pulm: Tachypneic to 24 BPM on 10L facemask, mildly increased work of breathing. Decreased breath sounds throughout R lung in all fields, most prominent in lower field, left lung CTA in upper lobes, slightly diminished in lower field  Ext: 1+ bilateral symmetric pedal edema    Labs:  Recent Labs   Lab 08/19/22  0208 08/23/22  1415 08/24/22  2150   WBC 15.87* 8.20 12.24   HGB 8.9* 9.4* 10.1*   HCT 27.5* 29.7* 32.3*   MCV 79* 79* 80*    372 416      Recent Labs   Lab 08/18/22  0702 08/23/22  1415 08/24/22  2150   * 142 144   K 4.0 4.0 4.2    108 111*   CO2 18* 24 21*   BUN 6 7 6   CREATININE 0.7 0.7 0.7   * 90 94   PROT 5.1* 6.1 5.9*   BILITOT 1.1* 0.5 0.4   ALKPHOS 189* 124 115   ALT 14 43 35   AST 27 53* 36        A/P:  - Labs reviewed as follows: plts 416, Cr 0.7, AST/ALT 36/25, , Troponin neg  - EKG sinus tachycardia   - Continue MgSO4 for seizure prophylaxis  - Lasix 40 mg IV given to initiate diuresis, will continue with 20 mg oral daily given evidence of pulmonary edema on CXR and examination  - Wean O2 as tolerated, weaned from 10 to 9L while in room  - Will obtain Echo in AM given pulmonary edema and elevated BNP  - Tessalon ordered PRN for cough  - Covid-19 negative, Flu swab ordered  - Remainder of plan as outlined in H&P    Kiarra Michael M.D.  OB/GYN PGY-4

## 2022-08-26 LAB
ALBUMIN SERPL BCP-MCNC: 2.1 G/DL (ref 3.5–5.2)
ALP SERPL-CCNC: 129 U/L (ref 55–135)
ALT SERPL W/O P-5'-P-CCNC: 21 U/L (ref 10–44)
ANION GAP SERPL CALC-SCNC: 12 MMOL/L (ref 8–16)
AST SERPL-CCNC: 19 U/L (ref 10–40)
BACTERIA #/AREA URNS HPF: ABNORMAL /HPF
BASOPHILS # BLD AUTO: 0.03 K/UL (ref 0–0.2)
BASOPHILS NFR BLD: 0.2 % (ref 0–1.9)
BILIRUB SERPL-MCNC: 0.7 MG/DL (ref 0.1–1)
BILIRUB UR QL STRIP: NEGATIVE
BNP SERPL-MCNC: 77 PG/ML (ref 0–99)
BUN SERPL-MCNC: 7 MG/DL (ref 6–20)
CALCIUM SERPL-MCNC: 7.2 MG/DL (ref 8.7–10.5)
CHLORIDE SERPL-SCNC: 104 MMOL/L (ref 95–110)
CLARITY UR: CLEAR
CO2 SERPL-SCNC: 21 MMOL/L (ref 23–29)
COLOR UR: YELLOW
CREAT SERPL-MCNC: 0.7 MG/DL (ref 0.5–1.4)
DIFFERENTIAL METHOD: ABNORMAL
EOSINOPHIL # BLD AUTO: 0 K/UL (ref 0–0.5)
EOSINOPHIL NFR BLD: 0.2 % (ref 0–8)
ERYTHROCYTE [DISTWIDTH] IN BLOOD BY AUTOMATED COUNT: 17.2 % (ref 11.5–14.5)
EST. GFR  (NO RACE VARIABLE): >60 ML/MIN/1.73 M^2
FINAL PATHOLOGIC DIAGNOSIS: NORMAL
GLUCOSE SERPL-MCNC: 129 MG/DL (ref 70–110)
GLUCOSE UR QL STRIP: NEGATIVE
GROSS: NORMAL
HCT VFR BLD AUTO: 30.4 % (ref 37–48.5)
HGB BLD-MCNC: 9.6 G/DL (ref 12–16)
HGB UR QL STRIP: ABNORMAL
IMM GRANULOCYTES # BLD AUTO: 0.15 K/UL (ref 0–0.04)
IMM GRANULOCYTES NFR BLD AUTO: 0.8 % (ref 0–0.5)
KETONES UR QL STRIP: NEGATIVE
LACTATE SERPL-SCNC: 1.1 MMOL/L (ref 0.5–2.2)
LEUKOCYTE ESTERASE UR QL STRIP: ABNORMAL
LYMPHOCYTES # BLD AUTO: 1.2 K/UL (ref 1–4.8)
LYMPHOCYTES NFR BLD: 6.5 % (ref 18–48)
Lab: NORMAL
MAGNESIUM SERPL-MCNC: 3.5 MG/DL (ref 1.6–2.6)
MCH RBC QN AUTO: 25 PG (ref 27–31)
MCHC RBC AUTO-ENTMCNC: 31.6 G/DL (ref 32–36)
MCV RBC AUTO: 79 FL (ref 82–98)
MICROSCOPIC COMMENT: ABNORMAL
MONOCYTES # BLD AUTO: 0.9 K/UL (ref 0.3–1)
MONOCYTES NFR BLD: 4.6 % (ref 4–15)
NEUTROPHILS # BLD AUTO: 16.3 K/UL (ref 1.8–7.7)
NEUTROPHILS NFR BLD: 87.7 % (ref 38–73)
NITRITE UR QL STRIP: NEGATIVE
NRBC BLD-RTO: 0 /100 WBC
PH UR STRIP: 7 [PH] (ref 5–8)
PHOSPHATE SERPL-MCNC: 3.9 MG/DL (ref 2.7–4.5)
PLATELET # BLD AUTO: 441 K/UL (ref 150–450)
PMV BLD AUTO: 8.8 FL (ref 9.2–12.9)
POTASSIUM SERPL-SCNC: 3.9 MMOL/L (ref 3.5–5.1)
PROT SERPL-MCNC: 5.8 G/DL (ref 6–8.4)
PROT UR QL STRIP: NEGATIVE
RBC # BLD AUTO: 3.84 M/UL (ref 4–5.4)
RBC #/AREA URNS HPF: 15 /HPF (ref 0–4)
SODIUM SERPL-SCNC: 137 MMOL/L (ref 136–145)
SP GR UR STRIP: <=1.005 (ref 1–1.03)
URN SPEC COLLECT METH UR: ABNORMAL
UROBILINOGEN UR STRIP-ACNC: 1 EU/DL
WBC # BLD AUTO: 18.58 K/UL (ref 3.9–12.7)
WBC #/AREA URNS HPF: 10 /HPF (ref 0–5)

## 2022-08-26 PROCEDURE — 87070 CULTURE OTHR SPECIMN AEROBIC: CPT | Performed by: STUDENT IN AN ORGANIZED HEALTH CARE EDUCATION/TRAINING PROGRAM

## 2022-08-26 PROCEDURE — 63600175 PHARM REV CODE 636 W HCPCS: Performed by: OBSTETRICS & GYNECOLOGY

## 2022-08-26 PROCEDURE — 83880 ASSAY OF NATRIURETIC PEPTIDE: CPT | Performed by: STUDENT IN AN ORGANIZED HEALTH CARE EDUCATION/TRAINING PROGRAM

## 2022-08-26 PROCEDURE — 99232 PR SUBSEQUENT HOSPITAL CARE,LEVL II: ICD-10-PCS | Mod: ,,, | Performed by: OBSTETRICS & GYNECOLOGY

## 2022-08-26 PROCEDURE — 81000 URINALYSIS NONAUTO W/SCOPE: CPT | Performed by: STUDENT IN AN ORGANIZED HEALTH CARE EDUCATION/TRAINING PROGRAM

## 2022-08-26 PROCEDURE — 25000003 PHARM REV CODE 250

## 2022-08-26 PROCEDURE — 25000003 PHARM REV CODE 250: Performed by: STUDENT IN AN ORGANIZED HEALTH CARE EDUCATION/TRAINING PROGRAM

## 2022-08-26 PROCEDURE — 94761 N-INVAS EAR/PLS OXIMETRY MLT: CPT

## 2022-08-26 PROCEDURE — 85025 COMPLETE CBC W/AUTO DIFF WBC: CPT | Performed by: OBSTETRICS & GYNECOLOGY

## 2022-08-26 PROCEDURE — 63600175 PHARM REV CODE 636 W HCPCS: Performed by: STUDENT IN AN ORGANIZED HEALTH CARE EDUCATION/TRAINING PROGRAM

## 2022-08-26 PROCEDURE — 83735 ASSAY OF MAGNESIUM: CPT | Performed by: STUDENT IN AN ORGANIZED HEALTH CARE EDUCATION/TRAINING PROGRAM

## 2022-08-26 PROCEDURE — 87205 SMEAR GRAM STAIN: CPT | Performed by: STUDENT IN AN ORGANIZED HEALTH CARE EDUCATION/TRAINING PROGRAM

## 2022-08-26 PROCEDURE — 36415 COLL VENOUS BLD VENIPUNCTURE: CPT | Performed by: OBSTETRICS & GYNECOLOGY

## 2022-08-26 PROCEDURE — 80053 COMPREHEN METABOLIC PANEL: CPT | Performed by: STUDENT IN AN ORGANIZED HEALTH CARE EDUCATION/TRAINING PROGRAM

## 2022-08-26 PROCEDURE — 99232 SBSQ HOSP IP/OBS MODERATE 35: CPT | Mod: ,,, | Performed by: OBSTETRICS & GYNECOLOGY

## 2022-08-26 PROCEDURE — 83605 ASSAY OF LACTIC ACID: CPT | Performed by: OBSTETRICS & GYNECOLOGY

## 2022-08-26 PROCEDURE — 84100 ASSAY OF PHOSPHORUS: CPT | Performed by: STUDENT IN AN ORGANIZED HEALTH CARE EDUCATION/TRAINING PROGRAM

## 2022-08-26 PROCEDURE — 11000001 HC ACUTE MED/SURG PRIVATE ROOM

## 2022-08-26 PROCEDURE — 25500020 PHARM REV CODE 255: Performed by: OBSTETRICS & GYNECOLOGY

## 2022-08-26 RX ORDER — ENOXAPARIN SODIUM 100 MG/ML
20 INJECTION SUBCUTANEOUS EVERY 24 HOURS
Status: DISCONTINUED | OUTPATIENT
Start: 2022-08-26 | End: 2022-08-26

## 2022-08-26 RX ORDER — ENOXAPARIN SODIUM 100 MG/ML
40 INJECTION SUBCUTANEOUS EVERY 12 HOURS
Status: DISCONTINUED | OUTPATIENT
Start: 2022-08-26 | End: 2022-08-29 | Stop reason: HOSPADM

## 2022-08-26 RX ORDER — FUROSEMIDE 10 MG/ML
20 INJECTION INTRAMUSCULAR; INTRAVENOUS DAILY
Status: DISCONTINUED | OUTPATIENT
Start: 2022-08-26 | End: 2022-08-29 | Stop reason: HOSPADM

## 2022-08-26 RX ORDER — SODIUM CHLORIDE, SODIUM LACTATE, POTASSIUM CHLORIDE, CALCIUM CHLORIDE 600; 310; 30; 20 MG/100ML; MG/100ML; MG/100ML; MG/100ML
INJECTION, SOLUTION INTRAVENOUS
Status: DISCONTINUED | OUTPATIENT
Start: 2022-08-26 | End: 2022-08-29 | Stop reason: HOSPADM

## 2022-08-26 RX ORDER — ACETAMINOPHEN 500 MG
1000 TABLET ORAL ONCE
Status: COMPLETED | OUTPATIENT
Start: 2022-08-26 | End: 2022-08-26

## 2022-08-26 RX ADMIN — ENOXAPARIN SODIUM 40 MG: 100 INJECTION SUBCUTANEOUS at 09:08

## 2022-08-26 RX ADMIN — ENOXAPARIN SODIUM 40 MG: 100 INJECTION SUBCUTANEOUS at 08:08

## 2022-08-26 RX ADMIN — LABETALOL HYDROCHLORIDE 200 MG: 200 TABLET, FILM COATED ORAL at 08:08

## 2022-08-26 RX ADMIN — LABETALOL HYDROCHLORIDE 200 MG: 200 TABLET, FILM COATED ORAL at 09:08

## 2022-08-26 RX ADMIN — IBUPROFEN 800 MG: 400 TABLET, FILM COATED ORAL at 01:08

## 2022-08-26 RX ADMIN — DOCUSATE SODIUM 100 MG: 100 CAPSULE, LIQUID FILLED ORAL at 09:08

## 2022-08-26 RX ADMIN — IBUPROFEN 800 MG: 400 TABLET, FILM COATED ORAL at 08:08

## 2022-08-26 RX ADMIN — SODIUM CHLORIDE, SODIUM LACTATE, POTASSIUM CHLORIDE, AND CALCIUM CHLORIDE: .6; .31; .03; .02 INJECTION, SOLUTION INTRAVENOUS at 03:08

## 2022-08-26 RX ADMIN — ACETAMINOPHEN 1000 MG: 500 TABLET, FILM COATED ORAL at 06:08

## 2022-08-26 RX ADMIN — PIPERACILLIN SODIUM AND TAZOBACTAM SODIUM 4.5 G: 4; .5 INJECTION, POWDER, LYOPHILIZED, FOR SOLUTION INTRAVENOUS at 03:08

## 2022-08-26 RX ADMIN — FERROUS SULFATE TAB 325 MG (65 MG ELEMENTAL FE) 1 EACH: 325 (65 FE) TAB at 09:08

## 2022-08-26 RX ADMIN — FUROSEMIDE 20 MG: 10 INJECTION, SOLUTION INTRAMUSCULAR; INTRAVENOUS at 09:08

## 2022-08-26 RX ADMIN — IOHEXOL 100 ML: 350 INJECTION, SOLUTION INTRAVENOUS at 10:08

## 2022-08-26 RX ADMIN — SODIUM CHLORIDE, SODIUM LACTATE, POTASSIUM CHLORIDE, AND CALCIUM CHLORIDE: .6; .31; .03; .02 INJECTION, SOLUTION INTRAVENOUS at 10:08

## 2022-08-26 RX ADMIN — DOCUSATE SODIUM 100 MG: 100 CAPSULE, LIQUID FILLED ORAL at 08:08

## 2022-08-26 NOTE — PROGRESS NOTES
[facility])  Obstetrics & Gynecology  Progress Note    Patient Name: Dipti Villafana  MRN: [id number]  Admission Date: 8/24/2022  Primary Care Provider: Primary Doctor No  Principal Problem: <principal problem not specified>    Subjective:     Interval History: Patient reports improvement in SOB this morning. She denies any nausea, vomiting, fevers or chills. She denies any headaches, visual changes, chest pain or RUQ pain. Patient was noted to have chills overnight with a temperature of 99.0F. RN noted sputum production overnight.     Scheduled Meds:   docusate sodium  100 mg Oral BID    enoxaparin  40 mg Subcutaneous Q12H    ferrous sulfate  1 tablet Oral Daily    furosemide (LASIX) injection  40 mg Intravenous Q12H    ibuprofen  800 mg Oral Q8H    labetaloL  200 mg Oral Q12H    piperacillin-tazobactam (ZOSYN) IVPB  4.5 g Intravenous Q6H     Continuous Infusions:  PRN Meds:acetaminophen, benzonatate, calcium gluconate, diphenhydrAMINE, diphenhydrAMINE, glycerin adult, HYDROcodone-acetaminophen, ondansetron, prochlorperazine, senna-docusate 8.6-50 mg, simethicone, sodium chloride 0.9%    Review of patient's allergies indicates:  No Known Allergies    Objective:     Vital Signs (Most Recent):  Temp: (!) 100.6 °F (38.1 °C) (08/26/22 0621)  Pulse: (!) 120 (08/26/22 0621)  Resp: (!) 25 (08/26/22 0427)  BP: 122/71 (08/26/22 0427)  SpO2: 95 % (08/26/22 0621) Vital Signs (24h Range):  Temp:  [98.1 °F (36.7 °C)-100.6 °F (38.1 °C)] 100.6 °F (38.1 °C)  Pulse:  [] 120  Resp:  [20-25] 25  SpO2:  [93 %-100 %] 95 %  BP: ()/(55-92) 122/71     Weight: 102.2 kg (225 lb 5 oz)  Body mass index is 44 kg/m².  Patient's last menstrual period was 11/25/2021.    I&O (Last 24H):    Intake/Output Summary (Last 24 hours) at 8/26/2022 0652  Last data filed at 8/25/2022 2308  Gross per 24 hour   Intake 1740.68 ml   Output 7670 ml   Net -5929.32 ml       Physical Exam:   Constitutional: She is oriented to  person, place, and time. She appears well-developed and well-nourished.   Feels warm    HENT:   Head: Normocephalic and atraumatic.   NC in place      Cardiovascular:   Tachycardic    Pulmonary/Chest: Effort normal. No respiratory distress. Right breast exhibits no mass, no nipple discharge, no skin change, no tenderness and no swelling. Left breast exhibits no mass, no nipple discharge, no skin change, no tenderness and no swelling.   Right sided crackles. Left lung field clear to auscultation         Abdominal: Soft. She exhibits no distension. There is abdominal tenderness (appropriate post-op tenderness). There is no rebound and no guarding.   No fundal tenderness             Musculoskeletal: Normal range of motion.      Right lower leg: She exhibits no tenderness. No edema.      Left lower leg: She exhibits no tenderness. No edema.       Neurological: She is alert and oriented to person, place, and time.    Skin: Skin is warm and dry.    Psychiatric: She has a normal mood and affect.       Laboratory:  CBC:   Recent Labs   Lab 08/24/22  2150   WBC 12.24   RBC 4.03   HGB 10.1*   HCT 32.3*      MCV 80*   MCH 25.1*   MCHC 31.3*     CMP:   Recent Labs   Lab 08/25/22 2051   *   CALCIUM 7.1*   ALBUMIN 2.1*   PROT 5.8*      K 3.4*   CO2 25      BUN 6   CREATININE 0.8   ALKPHOS 118   ALT 25   AST 20   BILITOT 0.4         Assessment/Plan:     Active Diagnoses:    Diagnosis Date Noted POA    Pre-eclampsia in postpartum period [O14.95] 08/24/2022 Unknown      Problems Resolved During this Admission:       1. Pre-Eclampsia with Severe Features with Pulmonary Edema   - Patient diagnosed with pre-eclampsia with severe features yesterday based on severe range blood pressures and pulmonary edema on CXR  - VSS  - Patient currently on 3L oxygen mask, continue to wean as appropriate  - Pulmonary exam: crackles in right lung field, L lung clear to auscultation  - Pre-E labs: AST/ALT 26/31, Cr 0.7, Plt  416  - s/p magnesium for seizure prophylaxis  - Continue labetalol 200 mg BID  - Continue Lasix 40 mg IV daily for diuresis in setting of pulmonary edema  - Strict I/O  - Urine output: 2.01 cc/kg/hr  - Echo completed yesterday: mild concentric hypertrophy of LV, EF of 55%, grade II LV diastolic function, normal RV size with normal RV systolic function    2. Suspected pneumonia  - Patient felt warm to touch on exam today  - Temperature noted to be 100.6 this morning   - Right sided crackles noted on lung exam this morning  - CXR on admission concerning for right sided pulmonary edema vs pneumonia   - Sputum cultures ordered   - Repeat CXR ordered   - Pip/tazo ordered   - CTA chest ordered      2. Maternal Obesity:   - Continue Lovenox 40 mg BID while inpatient   - SCDs in place  - Encourage ambulation when able to      3. Depression:   - No home meds  - Scheduled for postpartum mood check on 09/02/2022      4. POD#6 s/p pLTCS and BTL:   - Continue postpartum management   - Continue glycerin suppository and senna-docusate.   - Patient not breast feeding.   - Pain well controlled          Nikki Valels MD  Obstetrics & Gynecology  [facility]

## 2022-08-26 NOTE — PROGRESS NOTES
[facility]  Obstetrics & Gynecology  Progress Note    Patient Name: Dipti Villafana  MRN: [id number]  Admission Date: 2022  Primary Care Provider: Primary Doctor No  Principal Problem: <principal problem not specified>    Subjective:     History of Present Illness:  Dipti Villafana is a 37 y.o. [id number] POD#6 s/p pLTCS and BTL who presented to the OB ED with complaints of acute onset of shortness of breath since earlier this afternoon and subsequently diagnosed with pre-eclampsia with severe features 2/2 sustained severely elevated blood pressures and pulmonary edema. Patient denies headache with vision changes, chest pain, palpitations, RUQ pain, increased lower limb swelling/ pain/ erythema.      Of note, prior to discharge, patient was diagnosed with gHTN; however, did not require magnesium or anti-hypertensive medication. Additionally, patient had postpartum hemorrhage that required 1 unit of pRBCs prior to discharge.      This IUP was complicated by gHTN, maternal obesity, depression, and AMA.      Interval History:   Now POD #8 s/p pLTCS w/ BTL. Patient reports continued shortness of breath, however, has improved since admission. SOB worsened with exertion. Currently requiring 3L NC at rest. Reports sputum production. Denies chest pain or cough. Denies F/C, headaches, vision changes, N/V, abdominal pain. Denies leg pain or swelling. Reports lochia has been mild. No breast pain/tenderness/warmth, patient is not breast feeding.     Scheduled Meds:   docusate sodium  100 mg Oral BID    enoxaparin  40 mg Subcutaneous Q12H    ferrous sulfate  1 tablet Oral Daily    furosemide (LASIX) injection  20 mg Intravenous Daily    ibuprofen  800 mg Oral Q8H    labetaloL  200 mg Oral Q12H    piperacillin-tazobactam (ZOSYN) IVPB  4.5 g Intravenous Q8H     Continuous Infusions:  PRN Meds:acetaminophen, benzonatate, calcium gluconate, diphenhydrAMINE, diphenhydrAMINE, glycerin adult,  HYDROcodone-acetaminophen, lactated ringers, ondansetron, prochlorperazine, senna-docusate 8.6-50 mg, simethicone, sodium chloride 0.9%    Review of patient's allergies indicates:  No Known Allergies    Objective:     Vital Signs (Most Recent):  Temp: 98.3 °F (36.8 °C) (08/26/22 1217)  Pulse: 103 (08/26/22 1217)  Resp: 20 (08/26/22 1217)  BP: 107/63 (08/26/22 1217)  SpO2: 95 % (08/26/22 1400) Vital Signs (24h Range):  Temp:  [97.8 °F (36.6 °C)-100.6 °F (38.1 °C)] 98.3 °F (36.8 °C)  Pulse:  [] 103  Resp:  [20-25] 20  SpO2:  [93 %-99 %] 95 %  BP: ()/(55-77) 107/63     Weight: 102.2 kg (225 lb 5 oz)  Body mass index is 44 kg/m².  Patient's last menstrual period was 11/25/2021.    I&O (Last 24H):    Intake/Output Summary (Last 24 hours) at 8/26/2022 1515  Last data filed at 8/26/2022 1445  Gross per 24 hour   Intake 994.65 ml   Output 5770 ml   Net -4775.35 ml       Physical Exam:   Constitutional: She is oriented to person, place, and time. She appears well-developed and well-nourished.    HENT:   Head: Normocephalic and atraumatic.      Cardiovascular:   Tachycardic    Pulmonary/Chest: Effort normal. No respiratory distress. Right breast exhibits no mass, no nipple discharge, no skin change, no tenderness and no swelling. Left breast exhibits no mass, no nipple discharge, no skin change, no tenderness and no swelling.   Right sided crackles. Left lung field clear to auscultation         Abdominal: Soft. She exhibits no distension. There is abdominal tenderness (appropriate post-op tenderness). There is no rebound and no guarding.   No fundal tenderness             Musculoskeletal: Normal range of motion.      Right lower leg: She exhibits no tenderness. No edema.      Left lower leg: She exhibits no tenderness. No edema.       Neurological: She is alert and oriented to person, place, and time.    Skin: Skin is warm and dry.    Psychiatric: She has a normal mood and affect.       Laboratory:  CBC:   Recent  Labs   Lab 08/26/22  0939   WBC 18.58*   RBC 3.84*   HGB 9.6*   HCT 30.4*      MCV 79*   MCH 25.0*   MCHC 31.6*     CMP:   Recent Labs   Lab 08/26/22  0746   *   CALCIUM 7.2*   ALBUMIN 2.1*   PROT 5.8*      K 3.9   CO2 21*      BUN 7   CREATININE 0.7   ALKPHOS 129   ALT 21   AST 19   BILITOT 0.7         Assessment/Plan:     Active Diagnoses:    Diagnosis Date Noted POA    Pre-eclampsia in postpartum period [O14.95] 08/24/2022 Unknown      Problems Resolved During this Admission:       1. Pre-Eclampsia with Severe Features with Pulmonary Edema   - Patient diagnosed with pre-eclampsia with severe features yesterday based on severe range blood pressures and pulmonary edema on CXR  - VSS  - Patient currently on 3L NC, continue to wean as appropriate  - Pulmonary exam: crackles in right lung field, L lung clear to auscultation  - Pre-E labs: AST/ALT 26/31, Cr 0.7, Plt 416  - s/p magnesium for seizure prophylaxis  - Continue labetalol 200 mg BID  - Wean Lasix to 40 mg IV daily for diuresis in setting of pulmonary edema  - Strict I/O  - Urine output: 2.01 cc/kg/hr  - Echo completed yesterday: mild concentric hypertrophy of LV, EF of 55%, grade II LV diastolic function, normal RV size with normal RV systolic function    2. Fever, Suspected pneumonia  - Patient without evidence of postpartum endometritis, wound infection, DVT. Will evaluate for PE vs pneumonia   - Temperature noted to be 100.6 this morning   - Right sided crackles noted on lung exam  - CXR on admission concerning for right sided pulmonary edema vs pneumonia   - Sputum cultures ordered   - Will empirically start Pip/tazo    - CTA chest ordered to rule out PE     2. Maternal Obesity:   - Continue Lovenox 40 mg BID while inpatient   - SCDs in place  - Encourage ambulation as able      3. Depression:   - No home meds  - Scheduled for postpartum mood check on 09/02/2022      4. POD#8 s/p pLTCS and BTL:   - Continue postpartum management    - Continue glycerin suppository and senna-docusate.   - Patient not breast feeding.   - Pain well controlled        Gris Gonzales MD  Obstetrics & Gynecology  [facility]    I have reviewed and agree with progress note, assessment and plan as written by Dr. Gonzales. Patient is a 37 y.o.   PPD 8 s/p pLTCS and BTL readmitted with pre-e with severe features and pulmonary edema. Now with suspected pneumonia. M consulted for assistance with management. S/p 24 hrs of magnesium.     complicated by post partum hemorrhage resulting in EBL 2285 with large extension of hysterotomy into right broad ligament and uterine artery. She received 1 u PRBC perioperatively. She was discharged home on POD 3 without complications. Pregnancy was c/b A2GDM on metformin.    Pt with significant diuresis, but minimal improvement in supplemental O2 requirement, currently on 3 L. Underwent CTA today with no evidence of PE, however evidence of ground glass opacities and concern for pneumonia. Due to fever this morning, leukocytosis, clinical exam and imaging patient started on zosyn.     Temp:  [97.8 °F (36.6 °C)-100.6 °F (38.1 °C)] 98.3 °F (36.8 °C)  Pulse:  [] 103  Resp:  [20-25] 20  SpO2:  [93 %-99 %] 95 %  BP: ()/(55-74) 107/63      1.Pneumonia  --CTA and CXR with findings of pneumonia. Tm 100.6 this morning. Continue zosyn with sputum cultures and gram stain pending. Flu and COVID neg. CTA neg for PE.  --Continue supportive care    2. Pre-e with severe features with pulmonary edema  --Continue blood pressure control  --Continue labetalol 200 mg BID  --Decrease lasix to 20 mg qD  --Continue lovenox 40 mg BID    Korina Suarez MD  PGY 5  Maternal Fetal Medicine  [facility]

## 2022-08-26 NOTE — PLAN OF CARE
Pt BP stable, afebrile, oxygen weaned to 2L NC, O2 dips with ambulation but returns to normal at rest. Total I/O at 1800 -2085. CT scan revealed R low consolidation and atelectasis with less impressive L lobe findings. Pt continues to have blood tinged productive cough, sputum culture currently growing gram + cocci and rods, Zosyn initiated. Pt getting repeat CBC, CMP, Mg, and Ph in am. Kothari removed at 1430, pt voiding spontaneously with adequate output.     Problem: Adult Inpatient Plan of Care  Goal: Plan of Care Review  Outcome: Ongoing, Progressing  Goal: Patient-Specific Goal (Individualized)  Outcome: Ongoing, Progressing  Goal: Absence of Hospital-Acquired Illness or Injury  Outcome: Ongoing, Progressing  Goal: Optimal Comfort and Wellbeing  Outcome: Ongoing, Progressing  Goal: Readiness for Transition of Care  Outcome: Ongoing, Progressing     Problem: Bariatric Environmental Safety  Goal: Safety Maintained with Care  Outcome: Ongoing, Progressing     Problem: Fall Injury Risk  Goal: Absence of Fall and Fall-Related Injury  Outcome: Ongoing, Progressing     Problem: Hypertensive Disorders in Pregnancy  Goal: Maternal-Fetal Stabilization  Outcome: Ongoing, Progressing     Problem: Infection  Goal: Absence of Infection Signs and Symptoms  Outcome: Ongoing, Progressing

## 2022-08-26 NOTE — PROGRESS NOTES
Pharmacist Renal Dose Adjustment Note    Dipti Villafana is a 37 y.o. female being treated with the medication zosyn    Patient Data:    Vital Signs (Most Recent):  Temp: (!) 100.6 °F (38.1 °C) (08/26/22 0621)  Pulse: (!) 120 (08/26/22 0621)  Resp: (!) 25 (08/26/22 0427)  BP: 122/71 (08/26/22 0427)  SpO2: 95 % (08/26/22 0621)   Vital Signs (72h Range):  Temp:  [98.1 °F (36.7 °C)-100.6 °F (38.1 °C)]   Pulse:  []   Resp:  [16-28]   BP: ()/(55-95)   SpO2:  [85 %-100 %]      Recent Labs   Lab 08/24/22  2150 08/25/22  0808 08/25/22 2051   CREATININE 0.7 0.7 0.8     Serum creatinine: 0.8 mg/dL 08/25/22 2051  Estimated creatinine clearance: 101.6 mL/min     Medication Dose Route Frequency   Previous Order Zosyn 4.5 g IV every 6 hours   New Order Zosyn 4.5 g IV every 8 hours     Renal dose adjustments performed as noted above.  We will continue monitoring and adjusting as necessary.    Pharmacist: Tracey Cunha, PharmD  [phone]

## 2022-08-26 NOTE — PROGRESS NOTES
Called to bedside for breast exam by Dr. Valles. MD requesting temperature on patient. Temperature- 100.6. Per MD, recheck in 30 minutes and give tylenol if still febrile.    0626- call received by Dr. Valles. Give tylenol now, collect urine culture and sputum culture.

## 2022-08-26 NOTE — PROGRESS NOTES
"Notified Dr. Michael of patient's complaints of "feeling cold". Patient seen to be shivering in bed with 4 blankets on. Room temp- 75F. Patient's temp- 99.0F. MD states will call back.    0315- Dr. Michael returned call. No further orders at this time.   "

## 2022-08-26 NOTE — PLAN OF CARE
VSS throughout shift. Safety precautions maintained; bed locked, siderails upX2. Intake and out managed. Pt receiving continuous MAG/LR IV fluids. Patient has denied any visual changes or RUQ pain. Pt c/o headache which resolved with medication. Pt remains on oxygen, was weaned to 2L of O2 via NC with humidification applied. Echo completed this AM. Patient resting between care. Will continue to monitor.

## 2022-08-26 NOTE — ED PROVIDER NOTES
Encounter Date: 2022       History     Chief Complaint   Patient presents with    Shortness of Breath    Cough    Hypertension     HPI  Review of patient's allergies indicates:  No Known Allergies  Past Medical History:   Diagnosis Date    Anemia     Gestational diabetes mellitus (GDM) affecting pregnancy 2022    Pre-eclampsia in postpartum period 2022     Past Surgical History:   Procedure Laterality Date     SECTION WITH TUBAL LIGATION N/A 2022    Procedure:  SECTION, WITH TUBAL LIGATION;  Surgeon: Jaqui Bedolla MD;  Location: [facility] L&D;  Service: OB/GYN;  Laterality: N/A;     Family History   Problem Relation Age of Onset    Diabetes Father     Hypertension Mother     Diabetes Mother     Heart failure Sister     Breast cancer Neg Hx     Colon cancer Neg Hx     Ovarian cancer Neg Hx      Social History     Tobacco Use    Smoking status: Never Smoker    Smokeless tobacco: Never Used   Substance Use Topics    Alcohol use: Not Currently     Comment: occasional    Drug use: No     Review of Systems    Physical Exam     Initial Vitals   BP Pulse Resp Temp SpO2   22 2115 22 2115 22 0058 22 2316 22   (!) 173/94 (!) 118 (!) 28 98.2 °F (36.8 °C) (!) 85 %      MAP       --                Physical Exam    ED Course   Procedures  Labs Reviewed   CBC W/ AUTO DIFFERENTIAL - Abnormal; Notable for the following components:       Result Value    Hemoglobin 10.1 (*)     Hematocrit 32.3 (*)     MCV 80 (*)     MCH 25.1 (*)     MCHC 31.3 (*)     RDW 17.0 (*)     MPV 8.7 (*)     Immature Granulocytes 3.4 (*)     Gran # (ANC) 9.2 (*)     Immature Grans (Abs) 0.42 (*)     Gran % 75.0 (*)     Lymph % 12.7 (*)     All other components within normal limits   COMPREHENSIVE METABOLIC PANEL - Abnormal; Notable for the following components:    Chloride 111 (*)     CO2 21 (*)     Calcium 8.5 (*)     Total Protein 5.9 (*)     Albumin 2.5 (*)     All other  components within normal limits   B-TYPE NATRIURETIC PEPTIDE - Abnormal; Notable for the following components:     (*)     All other components within normal limits   TROPONIN I   SARS-COV-2 RNA AMPLIFICATION, QUAL   POCT URINALYSIS, DIPSTICK OR TABLET REAGENT, AUTOMATED, WITH MICROSCOP   TYPE & SCREEN          Imaging Results          X-Ray Chest AP Portable (Final result)  Result time 08/24/22 22:20:31    Final result by Armando Kam MD (08/24/22 22:20:31)                 Impression:      Diffuse opacification/consolidation throughout the right lung which is most suggestive for pneumonia.  Asymmetric severe right sided pulmonary edema could present with similar appearance.      Electronically signed by: Armando Kam MD  Date:    08/24/2022  Time:    22:20             Narrative:    EXAMINATION:  XR CHEST AP PORTABLE    CLINICAL HISTORY:  Shortness of breath    TECHNIQUE:  Single frontal view of the chest was performed.    COMPARISON:  May 2018.    FINDINGS:  Cardiac silhouette is normal in size.  Diffuse asymmetric opacification and consolidative changes are seen throughout the right lung, more pronounced in the right lower lung zone.  Left lung is relatively clear.  No pneumothorax.  No significant pleural effusion seen.                                 Medications   calcium gluconate 100 mg/mL (10%) injection 1 g (has no administration in time range)   docusate sodium capsule 100 mg (100 mg Oral Given 8/26/22 0925)   ferrous sulfate tablet 1 each (1 each Oral Given 8/26/22 0925)   glycerin adult suppository 1 suppository (has no administration in time range)   ibuprofen tablet 800 mg (800 mg Oral Given 8/25/22 1651)   HYDROcodone-acetaminophen 5-325 mg per tablet 1 tablet (has no administration in time range)   sodium chloride 0.9% flush 10 mL (has no administration in time range)   acetaminophen tablet 650 mg (650 mg Oral Given 8/25/22 0705)   ondansetron disintegrating tablet 8 mg (has no  administration in time range)   prochlorperazine injection Soln 5 mg (has no administration in time range)   senna-docusate 8.6-50 mg per tablet 1 tablet (has no administration in time range)   simethicone chewable tablet 80 mg (has no administration in time range)   diphenhydrAMINE capsule 25 mg (has no administration in time range)   diphenhydrAMINE injection 25 mg (has no administration in time range)   labetaloL tablet 200 mg (200 mg Oral Given 8/26/22 0925)   benzonatate capsule 100 mg (100 mg Oral Given 8/25/22 0320)   piperacillin-tazobactam 4.5 g in dextrose 5 % 100 mL IVPB (ready to mix system) (has no administration in time range)   lactated ringers infusion (has no administration in time range)   iohexoL (OMNIPAQUE 350) injection 100 mL (has no administration in time range)   enoxaparin injection 40 mg (40 mg Subcutaneous Given 8/26/22 0924)   furosemide injection 20 mg (has no administration in time range)   labetaloL injection 20 mg (20 mg Intravenous Given 8/24/22 2150)   magnesium sulfate in water 40 gram/1,000 mL (4 %) bolus from bag 4 g (4 g Intravenous Bolus from Bag 8/24/22 2301)   furosemide injection 20 mg (20 mg Intravenous Given 8/25/22 0218)   furosemide injection 20 mg (20 mg Intravenous Given 8/25/22 0223)   labetaloL injection 20 mg (20 mg Intravenous Given 8/25/22 0427)   potassium chloride SA CR tablet 40 mEq (40 mEq Oral Given 8/25/22 2328)   acetaminophen tablet 1,000 mg (1,000 mg Oral Given 8/26/22 0639)                Attending Attestation:   Physician Attestation Statement for Resident:  As the supervising MD  -: I personally saw and examined Ms. Villafana. I agree with the documentation above and treatment plan. Pt presented with severe pre-eclampsia in the post partum period. Admitted for magnesium sulfate and BP management.                         Clinical Impression:   Final diagnoses:  [O14.95] Pre-eclampsia in postpartum period (Primary)          ED Disposition Condition     Observation               Sarahy Davila MD  08/26/22 0982

## 2022-08-26 NOTE — CARE UPDATE
LATE ENTRY    To bedside for PM evaluation at 2145. Patient resting comfortably in bed. Reports feeling improved overall tonight. Endorses improvement in work of breathing. O2 able to be weaned to 2L NC, currently saturating 98%. Tachycardia improved. Lung examination improved from prior but still with crackles in bases, most prominent in R light. Fundus non-TTP. Lower extremity edema improved, no asymmetry or erythema noted. UOP excellent with lasix 40 BID for diuresis. BP remains well controlled on Labetalol 200 BID.    Will CTM closely and wean O2 as tolerated overnight. Plan to D/C MgSO4 at 2300 if remains stable.    Kiarra Michael M.D.  OB/GYN PGY-4

## 2022-08-27 LAB
ALBUMIN SERPL BCP-MCNC: 2 G/DL (ref 3.5–5.2)
ALP SERPL-CCNC: 116 U/L (ref 55–135)
ALT SERPL W/O P-5'-P-CCNC: 19 U/L (ref 10–44)
ANION GAP SERPL CALC-SCNC: 11 MMOL/L (ref 8–16)
AST SERPL-CCNC: 19 U/L (ref 10–40)
BASOPHILS # BLD AUTO: 0.02 K/UL (ref 0–0.2)
BASOPHILS NFR BLD: 0.2 % (ref 0–1.9)
BILIRUB SERPL-MCNC: 0.6 MG/DL (ref 0.1–1)
BUN SERPL-MCNC: 9 MG/DL (ref 6–20)
CALCIUM SERPL-MCNC: 7.9 MG/DL (ref 8.7–10.5)
CHLORIDE SERPL-SCNC: 106 MMOL/L (ref 95–110)
CO2 SERPL-SCNC: 24 MMOL/L (ref 23–29)
CREAT SERPL-MCNC: 0.7 MG/DL (ref 0.5–1.4)
DIFFERENTIAL METHOD: ABNORMAL
EOSINOPHIL # BLD AUTO: 0.2 K/UL (ref 0–0.5)
EOSINOPHIL NFR BLD: 1.8 % (ref 0–8)
ERYTHROCYTE [DISTWIDTH] IN BLOOD BY AUTOMATED COUNT: 17.2 % (ref 11.5–14.5)
EST. GFR  (NO RACE VARIABLE): >60 ML/MIN/1.73 M^2
GLUCOSE SERPL-MCNC: 111 MG/DL (ref 70–110)
HCT VFR BLD AUTO: 28.7 % (ref 37–48.5)
HGB BLD-MCNC: 9 G/DL (ref 12–16)
IMM GRANULOCYTES # BLD AUTO: 0.06 K/UL (ref 0–0.04)
IMM GRANULOCYTES NFR BLD AUTO: 0.5 % (ref 0–0.5)
LYMPHOCYTES # BLD AUTO: 1.4 K/UL (ref 1–4.8)
LYMPHOCYTES NFR BLD: 11.8 % (ref 18–48)
MAGNESIUM SERPL-MCNC: 2.9 MG/DL (ref 1.6–2.6)
MCH RBC QN AUTO: 25 PG (ref 27–31)
MCHC RBC AUTO-ENTMCNC: 31.4 G/DL (ref 32–36)
MCV RBC AUTO: 80 FL (ref 82–98)
MONOCYTES # BLD AUTO: 0.8 K/UL (ref 0.3–1)
MONOCYTES NFR BLD: 6.5 % (ref 4–15)
NEUTROPHILS # BLD AUTO: 9.2 K/UL (ref 1.8–7.7)
NEUTROPHILS NFR BLD: 79.2 % (ref 38–73)
NRBC BLD-RTO: 0 /100 WBC
PHOSPHATE SERPL-MCNC: 4.1 MG/DL (ref 2.7–4.5)
PLATELET # BLD AUTO: 400 K/UL (ref 150–450)
PMV BLD AUTO: 8.8 FL (ref 9.2–12.9)
POTASSIUM SERPL-SCNC: 3.6 MMOL/L (ref 3.5–5.1)
PROT SERPL-MCNC: 5.8 G/DL (ref 6–8.4)
RBC # BLD AUTO: 3.6 M/UL (ref 4–5.4)
SODIUM SERPL-SCNC: 141 MMOL/L (ref 136–145)
WBC # BLD AUTO: 11.58 K/UL (ref 3.9–12.7)

## 2022-08-27 PROCEDURE — 63600175 PHARM REV CODE 636 W HCPCS: Performed by: STUDENT IN AN ORGANIZED HEALTH CARE EDUCATION/TRAINING PROGRAM

## 2022-08-27 PROCEDURE — 25000003 PHARM REV CODE 250: Performed by: STUDENT IN AN ORGANIZED HEALTH CARE EDUCATION/TRAINING PROGRAM

## 2022-08-27 PROCEDURE — 80053 COMPREHEN METABOLIC PANEL: CPT

## 2022-08-27 PROCEDURE — 85025 COMPLETE CBC W/AUTO DIFF WBC: CPT

## 2022-08-27 PROCEDURE — 99232 PR SUBSEQUENT HOSPITAL CARE,LEVL II: ICD-10-PCS | Mod: ,,, | Performed by: OBSTETRICS & GYNECOLOGY

## 2022-08-27 PROCEDURE — 27000221 HC OXYGEN, UP TO 24 HOURS

## 2022-08-27 PROCEDURE — 25000242 PHARM REV CODE 250 ALT 637 W/ HCPCS: Performed by: STUDENT IN AN ORGANIZED HEALTH CARE EDUCATION/TRAINING PROGRAM

## 2022-08-27 PROCEDURE — 83735 ASSAY OF MAGNESIUM: CPT

## 2022-08-27 PROCEDURE — 94761 N-INVAS EAR/PLS OXIMETRY MLT: CPT

## 2022-08-27 PROCEDURE — 63600175 PHARM REV CODE 636 W HCPCS: Performed by: OBSTETRICS & GYNECOLOGY

## 2022-08-27 PROCEDURE — 84100 ASSAY OF PHOSPHORUS: CPT

## 2022-08-27 PROCEDURE — 25000003 PHARM REV CODE 250

## 2022-08-27 PROCEDURE — 94799 UNLISTED PULMONARY SVC/PX: CPT

## 2022-08-27 PROCEDURE — 11000001 HC ACUTE MED/SURG PRIVATE ROOM

## 2022-08-27 PROCEDURE — 99232 SBSQ HOSP IP/OBS MODERATE 35: CPT | Mod: ,,, | Performed by: OBSTETRICS & GYNECOLOGY

## 2022-08-27 PROCEDURE — 99900035 HC TECH TIME PER 15 MIN (STAT)

## 2022-08-27 PROCEDURE — 36415 COLL VENOUS BLD VENIPUNCTURE: CPT

## 2022-08-27 PROCEDURE — 94640 AIRWAY INHALATION TREATMENT: CPT

## 2022-08-27 RX ORDER — ALBUTEROL SULFATE 2.5 MG/.5ML
2.5 SOLUTION RESPIRATORY (INHALATION) EVERY 4 HOURS PRN
Status: DISCONTINUED | OUTPATIENT
Start: 2022-08-27 | End: 2022-08-27

## 2022-08-27 RX ORDER — ALBUTEROL SULFATE 2.5 MG/.5ML
2.5 SOLUTION RESPIRATORY (INHALATION)
Status: DISCONTINUED | OUTPATIENT
Start: 2022-08-27 | End: 2022-08-29 | Stop reason: HOSPADM

## 2022-08-27 RX ADMIN — FUROSEMIDE 20 MG: 10 INJECTION, SOLUTION INTRAMUSCULAR; INTRAVENOUS at 08:08

## 2022-08-27 RX ADMIN — IBUPROFEN 800 MG: 400 TABLET, FILM COATED ORAL at 09:08

## 2022-08-27 RX ADMIN — LABETALOL HYDROCHLORIDE 200 MG: 200 TABLET, FILM COATED ORAL at 08:08

## 2022-08-27 RX ADMIN — ENOXAPARIN SODIUM 40 MG: 100 INJECTION SUBCUTANEOUS at 08:08

## 2022-08-27 RX ADMIN — PIPERACILLIN SODIUM AND TAZOBACTAM SODIUM 4.5 G: 4; .5 INJECTION, POWDER, LYOPHILIZED, FOR SOLUTION INTRAVENOUS at 12:08

## 2022-08-27 RX ADMIN — PIPERACILLIN SODIUM AND TAZOBACTAM SODIUM 4.5 G: 4; .5 INJECTION, POWDER, LYOPHILIZED, FOR SOLUTION INTRAVENOUS at 05:08

## 2022-08-27 RX ADMIN — PIPERACILLIN SODIUM AND TAZOBACTAM SODIUM 4.5 G: 4; .5 INJECTION, POWDER, LYOPHILIZED, FOR SOLUTION INTRAVENOUS at 08:08

## 2022-08-27 RX ADMIN — ALBUTEROL SULFATE 2.5 MG: 2.5 SOLUTION RESPIRATORY (INHALATION) at 08:08

## 2022-08-27 RX ADMIN — DOCUSATE SODIUM 100 MG: 100 CAPSULE, LIQUID FILLED ORAL at 08:08

## 2022-08-27 RX ADMIN — ALBUTEROL SULFATE 2.5 MG: 2.5 SOLUTION RESPIRATORY (INHALATION) at 10:08

## 2022-08-27 RX ADMIN — FERROUS SULFATE TAB 325 MG (65 MG ELEMENTAL FE) 1 EACH: 325 (65 FE) TAB at 09:08

## 2022-08-27 RX ADMIN — IBUPROFEN 800 MG: 400 TABLET, FILM COATED ORAL at 05:08

## 2022-08-27 NOTE — PLAN OF CARE
Pt sitting up in bed, VSS, cont pulse ox on, mid 90's. Crackles in right lobes, SOB. Water, juice, chap stick, and hand  given. Understands strict I&O. Discuss sitting up in chair this afternoon for 2 hours, seeing respiratory therapist and starting Incentive spirometer, states understanding. Meds given (*see MAR), antibiotic infusing with no problems. Will continue to monitor.     Problem: Adult Inpatient Plan of Care  Goal: Plan of Care Review  Outcome: Ongoing, Progressing  Goal: Patient-Specific Goal (Individualized)  Outcome: Ongoing, Progressing  Goal: Absence of Hospital-Acquired Illness or Injury  Outcome: Ongoing, Progressing  Goal: Optimal Comfort and Wellbeing  Outcome: Ongoing, Progressing  Goal: Readiness for Transition of Care  Outcome: Ongoing, Progressing     Problem: Bariatric Environmental Safety  Goal: Safety Maintained with Care  Outcome: Ongoing, Progressing     Problem: Fall Injury Risk  Goal: Absence of Fall and Fall-Related Injury  Outcome: Ongoing, Progressing     Problem: Hypertensive Disorders in Pregnancy  Goal: Maternal-Fetal Stabilization  Outcome: Ongoing, Progressing     Problem: Infection  Goal: Absence of Infection Signs and Symptoms  Outcome: Ongoing, Progressing     Problem: Fluid Imbalance (Pneumonia)  Goal: Fluid Balance  Outcome: Ongoing, Progressing     Problem: Infection (Pneumonia)  Goal: Resolution of Infection Signs and Symptoms  Outcome: Ongoing, Progressing     Problem: Respiratory Compromise (Pneumonia)  Goal: Effective Oxygenation and Ventilation  Outcome: Ongoing, Progressing     Problem: Activity Intolerance  Goal: Enhanced Capacity and Energy  Outcome: Ongoing, Progressing

## 2022-08-27 NOTE — NURSING
Pt had sat up in chair for two hours this afternoon and has use her ISP. Back in bed, VSS, personal items within reach. States feeling better. O2 sat around 97% and has not been decreasing when she gets up this afternoon. IV antibiotic infusing with no problems. Ice water given. Denies any other needs or concerns.

## 2022-08-27 NOTE — PROGRESS NOTES
[facility]  Obstetrics & Gynecology  Progress Note    Patient Name: Dipti Villafana  MRN: [id number]  Admission Date: 8/24/2022  Primary Care Provider: Primary Doctor No  Principal Problem: <principal problem not specified>    Subjective:     Interval History: Patient reports improvement in SOB this morning. Reports only short of breath with ambulation, denies SOB at rest. She denies any nausea, vomiting, fevers or chills. She denies any headaches, visual changes, chest pain or RUQ pain. States she is feeling better overall.     Scheduled Meds:   albuterol sulfate  2.5 mg Nebulization Q6H WAKE    docusate sodium  100 mg Oral BID    enoxaparin  40 mg Subcutaneous Q12H    ferrous sulfate  1 tablet Oral Daily    furosemide (LASIX) injection  20 mg Intravenous Daily    ibuprofen  800 mg Oral Q8H    labetaloL  200 mg Oral Q12H    piperacillin-tazobactam (ZOSYN) IVPB  4.5 g Intravenous Q8H     Continuous Infusions:  PRN Meds:benzonatate, calcium gluconate, diphenhydrAMINE, diphenhydrAMINE, glycerin adult, HYDROcodone-acetaminophen, lactated ringers, ondansetron, prochlorperazine, senna-docusate 8.6-50 mg, simethicone, sodium chloride 0.9%    Review of patient's allergies indicates:  No Known Allergies    Objective:     Vital Signs (Most Recent):  Temp: 97.8 °F (36.6 °C) (08/27/22 0427)  Pulse: 98 (08/27/22 1015)  Resp: 20 (08/27/22 1015)  BP: 135/61 (08/27/22 0427)  SpO2: 97 % (08/27/22 1015) Vital Signs (24h Range):  Temp:  [97.8 °F (36.6 °C)-98.9 °F (37.2 °C)] 97.8 °F (36.6 °C)  Pulse:  [] 98  Resp:  [18-20] 20  SpO2:  [95 %-98 %] 97 %  BP: (107-135)/(61-73) 135/61     Weight: 102.2 kg (225 lb 5 oz)  Body mass index is 44 kg/m².  Patient's last menstrual period was 11/25/2021.    I&O (Last 24H):    Intake/Output Summary (Last 24 hours) at 8/27/2022 1059  Last data filed at 8/26/2022 2355  Gross per 24 hour   Intake 214.61 ml   Output 3050 ml   Net -2835.39 ml           Physical Exam:    Constitutional: She is oriented to person, place, and time. She appears well-developed and well-nourished.    HENT:   Head: Normocephalic and atraumatic.   NC in place      Cardiovascular:  Normal rate.             Pulmonary/Chest: Effort normal. No respiratory distress. Right breast exhibits no mass, no nipple discharge, no skin change, no tenderness and no swelling. Left breast exhibits no mass, no nipple discharge, no skin change, no tenderness and no swelling.   Right sided crackles. Left lung field clear to auscultation         Abdominal: Soft. She exhibits no distension. There is abdominal tenderness (appropriate post-op tenderness). There is no rebound and no guarding.   No fundal tenderness             Musculoskeletal: Normal range of motion.      Right lower leg: She exhibits no tenderness. No edema.      Left lower leg: She exhibits no tenderness. No edema.       Neurological: She is alert and oriented to person, place, and time.    Skin: Skin is warm and dry.    Psychiatric: She has a normal mood and affect.     Laboratory:  CBC:   Recent Labs   Lab 08/27/22 0457   WBC 11.58   RBC 3.60*   HGB 9.0*   HCT 28.7*      MCV 80*   MCH 25.0*   MCHC 31.4*       CMP:   Recent Labs   Lab 08/27/22 0457   *   CALCIUM 7.9*   ALBUMIN 2.0*   PROT 5.8*      K 3.6   CO2 24      BUN 9   CREATININE 0.7   ALKPHOS 116   ALT 19   AST 19   BILITOT 0.6           Assessment/Plan:     Active Diagnoses:    Diagnosis Date Noted POA    Pre-eclampsia in postpartum period [O14.95] 08/24/2022 Unknown      Problems Resolved During this Admission:       1. Pre-Eclampsia with Severe Features with Pulmonary Edema   - Patient diagnosed with pre-eclampsia with severe features based on severe range blood pressures and pulmonary edema on CXR  - VSS  - Patient currently on 2L oxygen mask, continue to wean as appropriate  - Pulmonary exam: crackles in right lung field, L lung clear to auscultation  - Pre-E labs:  AST/ALT 26/31>19/19, Cr 0.7, Plt 416>400  - s/p magnesium for seizure prophylaxis  - Continue labetalol 200 mg BID  - Lasix 20 mg PO daily for diuresis in setting of pulmonary edema  - Strict I/O  - Urine output: 0.75cc/kg/hr  - Echo completed: mild concentric hypertrophy of LV, EF of 55%, grade II LV diastolic function, normal RV size with normal RV systolic function  - BNP from 213 to 77 yesterday    2. Suspected pneumonia  - T last 100.6 on 8/26 @ 2300   - Right sided crackles noted on lung exam this morning  - CXR on admission concerning for right sided pulmonary edema vs pneumonia   - Sputum cultures ordered   - Continue Pip/tazo for suspected PNA  - CTA chest ordered, results below    Impression:  1. No convincing evidence of acute pulmonary embolism.  2. Extensive airspace consolidation in the right lung with volume loss.  Less pronounced findings in the left lung.  Findings concerning for multifocal infection versus noninfectious inflammatory etiology.  Component of alveolar edema and/or hemorrhage additionally considered in the appropriate clinical context.  3. Additional details, as provided in the body of report.  -- Assessment of the lungs is limited due to respiratory motion.  Dense airspace consolidation and ground-glass opacities noted throughout the majority of the right lung.  Atelectasis is noted on the right as well.  Few patchy solid ground-glass opacities are noted predominantly demonstrated peribronchovascular distribution in the left lung as well  -incentive spirometry and breathing treatments ordered      2. Maternal Obesity:   - Continue Lovenox 40 mg BID while inpatient   - SCDs in place  - Encourage ambulation when able     3. Depression:   - No home meds  - Scheduled for postpartum mood check on 09/02/2022      4. POD#9 s/p pLTCS and BTL:   - Continue postpartum management   - Continue glycerin suppository and senna-docusate.   - Patient not breast feeding.   - Pain well  controlled          Adriana Walker MD  Obstetrics & Gynecology  [facility] ([place])

## 2022-08-27 NOTE — PLAN OF CARE
Problem: Adult Inpatient Plan of Care  Goal: Plan of Care Review  8/27/2022 1733 by Tati Gilbert RN  Outcome: Ongoing, Progressing  8/27/2022 1727 by Tati Gilbert RN  Outcome: Ongoing, Progressing  8/27/2022 1002 by Tati Gilbert RN  Outcome: Ongoing, Progressing  Goal: Patient-Specific Goal (Individualized)  8/27/2022 1733 by Tati Gilbert RN  Outcome: Ongoing, Progressing  8/27/2022 1727 by Tati Gilbert RN  Outcome: Ongoing, Progressing  8/27/2022 1002 by Tati Gilbert RN  Outcome: Ongoing, Progressing  Goal: Absence of Hospital-Acquired Illness or Injury  8/27/2022 1733 by Tati Gilbert RN  Outcome: Ongoing, Progressing  8/27/2022 1727 by Tati Gilbert RN  Outcome: Ongoing, Progressing  8/27/2022 1002 by Tati Gilbert RN  Outcome: Ongoing, Progressing  Goal: Optimal Comfort and Wellbeing  8/27/2022 1733 by Tati Gilbert RN  Outcome: Ongoing, Progressing  8/27/2022 1727 by Tati Gilbert RN  Outcome: Ongoing, Progressing  8/27/2022 1002 by Tati Gilbert RN  Outcome: Ongoing, Progressing  Goal: Readiness for Transition of Care  8/27/2022 1733 by Tati Gilbert RN  Outcome: Ongoing, Progressing  8/27/2022 1727 by Tati Gilbert RN  Outcome: Ongoing, Progressing  8/27/2022 1002 by Tati Gilbert RN  Outcome: Ongoing, Progressing     Problem: Bariatric Environmental Safety  Goal: Safety Maintained with Care  8/27/2022 1733 by Tati Gilbert RN  Outcome: Ongoing, Progressing  8/27/2022 1727 by Tati Gilbert RN  Outcome: Ongoing, Progressing  8/27/2022 1002 by Tati Gilbert RN  Outcome: Ongoing, Progressing     Problem: Fall Injury Risk  Goal: Absence of Fall and Fall-Related Injury  8/27/2022 1733 by Tati Gilbert RN  Outcome: Ongoing, Progressing  8/27/2022 1727 by Tati Gilbert RN  Outcome: Ongoing, Progressing  8/27/2022 1002 by Tati Gilbert RN  Outcome: Ongoing, Progressing     Problem: Hypertensive Disorders in Pregnancy  Goal: Maternal-Fetal Stabilization  8/27/2022 1733  by Tati Gilbert RN  Outcome: Ongoing, Progressing  8/27/2022 1727 by Tati Gilbert RN  Outcome: Ongoing, Progressing  8/27/2022 1002 by Tati Gilbert RN  Outcome: Ongoing, Progressing     Problem: Infection  Goal: Absence of Infection Signs and Symptoms  8/27/2022 1733 by Tati Gilbert RN  Outcome: Ongoing, Progressing  8/27/2022 1727 by Tati Gilbert RN  Outcome: Ongoing, Progressing  8/27/2022 1002 by Tati Gilbert RN  Outcome: Ongoing, Progressing     Problem: Fluid Imbalance (Pneumonia)  Goal: Fluid Balance  8/27/2022 1733 by Tati Gilbert RN  Outcome: Ongoing, Progressing  8/27/2022 1727 by Tati Gilbert RN  Outcome: Ongoing, Progressing  8/27/2022 1002 by Tati Gilbert RN  Outcome: Ongoing, Progressing     Problem: Infection (Pneumonia)  Goal: Resolution of Infection Signs and Symptoms  8/27/2022 1733 by Tati Gilbert RN  Outcome: Ongoing, Progressing  8/27/2022 1727 by Tati Gilbert RN  Outcome: Ongoing, Progressing  8/27/2022 1002 by Tati Gilbert RN  Outcome: Ongoing, Progressing     Problem: Respiratory Compromise (Pneumonia)  Goal: Effective Oxygenation and Ventilation  8/27/2022 1733 by Tati Gilbert RN  Outcome: Ongoing, Progressing  8/27/2022 1727 by Tati Gilbert RN  Outcome: Ongoing, Progressing  8/27/2022 1002 by Tati Gilbert RN  Outcome: Ongoing, Progressing     Problem: Activity Intolerance  Goal: Enhanced Capacity and Energy  8/27/2022 1733 by Tati Gilbert RN  Outcome: Ongoing, Progressing  8/27/2022 1727 by Tati Gilbert RN  Outcome: Ongoing, Progressing  8/27/2022 1002 by Tati Gilbert RN  Outcome: Ongoing, Progressing

## 2022-08-28 PROCEDURE — 99232 SBSQ HOSP IP/OBS MODERATE 35: CPT | Mod: ,,, | Performed by: OBSTETRICS & GYNECOLOGY

## 2022-08-28 PROCEDURE — 11000001 HC ACUTE MED/SURG PRIVATE ROOM

## 2022-08-28 PROCEDURE — 94640 AIRWAY INHALATION TREATMENT: CPT

## 2022-08-28 PROCEDURE — 25000242 PHARM REV CODE 250 ALT 637 W/ HCPCS: Performed by: STUDENT IN AN ORGANIZED HEALTH CARE EDUCATION/TRAINING PROGRAM

## 2022-08-28 PROCEDURE — 25000003 PHARM REV CODE 250: Performed by: STUDENT IN AN ORGANIZED HEALTH CARE EDUCATION/TRAINING PROGRAM

## 2022-08-28 PROCEDURE — 99232 PR SUBSEQUENT HOSPITAL CARE,LEVL II: ICD-10-PCS | Mod: ,,, | Performed by: OBSTETRICS & GYNECOLOGY

## 2022-08-28 PROCEDURE — 63700000 PHARM REV CODE 250 ALT 637 W/O HCPCS: Performed by: STUDENT IN AN ORGANIZED HEALTH CARE EDUCATION/TRAINING PROGRAM

## 2022-08-28 PROCEDURE — 94761 N-INVAS EAR/PLS OXIMETRY MLT: CPT

## 2022-08-28 PROCEDURE — 25000003 PHARM REV CODE 250

## 2022-08-28 PROCEDURE — 63600175 PHARM REV CODE 636 W HCPCS: Performed by: OBSTETRICS & GYNECOLOGY

## 2022-08-28 PROCEDURE — 63600175 PHARM REV CODE 636 W HCPCS: Performed by: STUDENT IN AN ORGANIZED HEALTH CARE EDUCATION/TRAINING PROGRAM

## 2022-08-28 RX ORDER — AZITHROMYCIN 250 MG/1
500 TABLET, FILM COATED ORAL DAILY
Status: DISCONTINUED | OUTPATIENT
Start: 2022-08-28 | End: 2022-08-29 | Stop reason: HOSPADM

## 2022-08-28 RX ORDER — AMOXICILLIN 250 MG/1
500 CAPSULE ORAL EVERY 8 HOURS
Status: DISCONTINUED | OUTPATIENT
Start: 2022-08-28 | End: 2022-08-29 | Stop reason: HOSPADM

## 2022-08-28 RX ADMIN — ALBUTEROL SULFATE 2.5 MG: 2.5 SOLUTION RESPIRATORY (INHALATION) at 08:08

## 2022-08-28 RX ADMIN — PIPERACILLIN SODIUM AND TAZOBACTAM SODIUM 4.5 G: 4; .5 INJECTION, POWDER, LYOPHILIZED, FOR SOLUTION INTRAVENOUS at 09:08

## 2022-08-28 RX ADMIN — ALBUTEROL SULFATE 2.5 MG: 2.5 SOLUTION RESPIRATORY (INHALATION) at 09:08

## 2022-08-28 RX ADMIN — IBUPROFEN 800 MG: 400 TABLET, FILM COATED ORAL at 02:08

## 2022-08-28 RX ADMIN — LABETALOL HYDROCHLORIDE 200 MG: 200 TABLET, FILM COATED ORAL at 08:08

## 2022-08-28 RX ADMIN — IBUPROFEN 800 MG: 400 TABLET, FILM COATED ORAL at 06:08

## 2022-08-28 RX ADMIN — ENOXAPARIN SODIUM 40 MG: 100 INJECTION SUBCUTANEOUS at 09:08

## 2022-08-28 RX ADMIN — ENOXAPARIN SODIUM 40 MG: 100 INJECTION SUBCUTANEOUS at 08:08

## 2022-08-28 RX ADMIN — PIPERACILLIN SODIUM AND TAZOBACTAM SODIUM 4.5 G: 4; .5 INJECTION, POWDER, LYOPHILIZED, FOR SOLUTION INTRAVENOUS at 02:08

## 2022-08-28 RX ADMIN — IBUPROFEN 800 MG: 400 TABLET, FILM COATED ORAL at 11:08

## 2022-08-28 RX ADMIN — DOCUSATE SODIUM 100 MG: 100 CAPSULE, LIQUID FILLED ORAL at 09:08

## 2022-08-28 RX ADMIN — ALBUTEROL SULFATE 2.5 MG: 2.5 SOLUTION RESPIRATORY (INHALATION) at 03:08

## 2022-08-28 RX ADMIN — DOCUSATE SODIUM 100 MG: 100 CAPSULE, LIQUID FILLED ORAL at 08:08

## 2022-08-28 RX ADMIN — AZITHROMYCIN MONOHYDRATE 500 MG: 250 TABLET ORAL at 11:08

## 2022-08-28 RX ADMIN — FUROSEMIDE 20 MG: 10 INJECTION, SOLUTION INTRAMUSCULAR; INTRAVENOUS at 09:08

## 2022-08-28 RX ADMIN — LABETALOL HYDROCHLORIDE 200 MG: 200 TABLET, FILM COATED ORAL at 09:08

## 2022-08-28 RX ADMIN — AMOXICILLIN 500 MG: 250 CAPSULE ORAL at 06:08

## 2022-08-28 RX ADMIN — FERROUS SULFATE TAB 325 MG (65 MG ELEMENTAL FE) 1 EACH: 325 (65 FE) TAB at 09:08

## 2022-08-28 NOTE — CARE UPDATE
MD to bedside for evening rounds    Pt reports she feels much better than yesterday, and much better since this morning. She states the incentive spirometry and breathing treatment really helped her symptoms. Continues to deny SOB at rest. Reports only SOB with ambulation and getting in and out of bed.    Otherwise ambulating without difficulty, voiding spontaneously, tolerating PO.     Denies pain, dysuria, hematuria, headaches, vision changes, chest pain, fever, chills, N/V/D, cough.    Temp:  [97.8 °F (36.6 °C)-98.5 °F (36.9 °C)] 98.3 °F (36.8 °C)  Pulse:  [82-98] 89  Resp:  [16-20] 20  SpO2:  [95 %-98 %] 98 %  BP: (120-135)/(61-83) 131/83    Physical exam  In no acute distress, on 2L O2 via NC  Normal work of breathing, speaking in full sentences  Pulm: Fainter crackles remain on R side of patient, less so on left side  Pt able to take deep breaths without difficulty    UOP 2.4cc/kg/hr, adequate     Plan:  Continue on IS and breathing treatments  Continue on abx for PNA  Continue lasix 20mg PO for diuresis  Continue lovenox 40mg BID  Continue to monitor symptoms     Adriana Walker MD  OBGYN PGY-2

## 2022-08-29 VITALS
HEART RATE: 98 BPM | HEIGHT: 60 IN | DIASTOLIC BLOOD PRESSURE: 79 MMHG | SYSTOLIC BLOOD PRESSURE: 126 MMHG | WEIGHT: 225.31 LBS | OXYGEN SATURATION: 97 % | TEMPERATURE: 98 F | BODY MASS INDEX: 44.23 KG/M2 | RESPIRATION RATE: 18 BRPM

## 2022-08-29 PROBLEM — J18.9 PNEUMONIA: Status: ACTIVE | Noted: 2022-08-29

## 2022-08-29 LAB
BACTERIA SPEC AEROBE CULT: NORMAL
BACTERIA SPEC AEROBE CULT: NORMAL
GRAM STN SPEC: NORMAL

## 2022-08-29 PROCEDURE — 94640 AIRWAY INHALATION TREATMENT: CPT

## 2022-08-29 PROCEDURE — 94761 N-INVAS EAR/PLS OXIMETRY MLT: CPT

## 2022-08-29 PROCEDURE — 94799 UNLISTED PULMONARY SVC/PX: CPT

## 2022-08-29 PROCEDURE — 99238 HOSP IP/OBS DSCHRG MGMT 30/<: CPT | Mod: ,,, | Performed by: OBSTETRICS & GYNECOLOGY

## 2022-08-29 PROCEDURE — 63600175 PHARM REV CODE 636 W HCPCS: Performed by: OBSTETRICS & GYNECOLOGY

## 2022-08-29 PROCEDURE — 25000003 PHARM REV CODE 250

## 2022-08-29 PROCEDURE — 99238 PR HOSPITAL DISCHARGE DAY,<30 MIN: ICD-10-PCS | Mod: ,,, | Performed by: OBSTETRICS & GYNECOLOGY

## 2022-08-29 PROCEDURE — 25000242 PHARM REV CODE 250 ALT 637 W/ HCPCS: Performed by: STUDENT IN AN ORGANIZED HEALTH CARE EDUCATION/TRAINING PROGRAM

## 2022-08-29 PROCEDURE — 25000003 PHARM REV CODE 250: Performed by: STUDENT IN AN ORGANIZED HEALTH CARE EDUCATION/TRAINING PROGRAM

## 2022-08-29 PROCEDURE — 63700000 PHARM REV CODE 250 ALT 637 W/O HCPCS: Performed by: STUDENT IN AN ORGANIZED HEALTH CARE EDUCATION/TRAINING PROGRAM

## 2022-08-29 RX ORDER — AZITHROMYCIN 500 MG/1
500 TABLET, FILM COATED ORAL DAILY
Qty: 4 TABLET | Refills: 0 | Status: SHIPPED | OUTPATIENT
Start: 2022-08-30 | End: 2022-09-03

## 2022-08-29 RX ORDER — AMOXICILLIN 500 MG/1
500 CAPSULE ORAL EVERY 8 HOURS
Qty: 12 CAPSULE | Refills: 0 | Status: SHIPPED | OUTPATIENT
Start: 2022-08-29 | End: 2022-09-02

## 2022-08-29 RX ORDER — LABETALOL 200 MG/1
200 TABLET, FILM COATED ORAL EVERY 12 HOURS
Qty: 60 TABLET | Refills: 11 | Status: SHIPPED | OUTPATIENT
Start: 2022-08-29 | End: 2023-08-29

## 2022-08-29 RX ADMIN — IBUPROFEN 800 MG: 400 TABLET, FILM COATED ORAL at 09:08

## 2022-08-29 RX ADMIN — AMOXICILLIN 500 MG: 250 CAPSULE ORAL at 01:08

## 2022-08-29 RX ADMIN — LABETALOL HYDROCHLORIDE 200 MG: 200 TABLET, FILM COATED ORAL at 09:08

## 2022-08-29 RX ADMIN — DOCUSATE SODIUM 100 MG: 100 CAPSULE, LIQUID FILLED ORAL at 09:08

## 2022-08-29 RX ADMIN — IBUPROFEN 800 MG: 400 TABLET, FILM COATED ORAL at 01:08

## 2022-08-29 RX ADMIN — ALBUTEROL SULFATE 2.5 MG: 2.5 SOLUTION RESPIRATORY (INHALATION) at 02:08

## 2022-08-29 RX ADMIN — AMOXICILLIN 500 MG: 250 CAPSULE ORAL at 09:08

## 2022-08-29 RX ADMIN — ALBUTEROL SULFATE 2.5 MG: 2.5 SOLUTION RESPIRATORY (INHALATION) at 08:08

## 2022-08-29 RX ADMIN — ENOXAPARIN SODIUM 40 MG: 100 INJECTION SUBCUTANEOUS at 09:08

## 2022-08-29 RX ADMIN — FUROSEMIDE 20 MG: 10 INJECTION, SOLUTION INTRAMUSCULAR; INTRAVENOUS at 09:08

## 2022-08-29 RX ADMIN — FERROUS SULFATE TAB 325 MG (65 MG ELEMENTAL FE) 1 EACH: 325 (65 FE) TAB at 09:08

## 2022-08-29 RX ADMIN — AZITHROMYCIN MONOHYDRATE 500 MG: 250 TABLET ORAL at 09:08

## 2022-08-29 NOTE — PROCEDURES
97% on room air at rest on August 29,2022.    96% on room air with activity/exercise on 08/29/2022.  96% on room air after exercise for 6 minute walk on 08/29/2022.

## 2022-08-29 NOTE — PLAN OF CARE
VSS with O2 saturations dropping below 95% intermittently; MD aware. No signs of respiratory distress noted over night. Incentive spirometer at bedside. Ambulating to bathroom and bedside commode independently with adequate output; passing flatus and bowel movement x1. Incision site ROMA, sutures approximated. Will continue to monitor and intervene as necessary.

## 2022-08-29 NOTE — PROGRESS NOTES
[facility]  Obstetrics & Gynecology  Progress Note    Patient Name: Dipti Villafana  MRN: [id number]  Admission Date: 2022  Primary Care Provider: Primary Doctor No  Principal Problem: <principal problem not specified>    Subjective:     History of Present Illness:  Dipti Villafana is a 37 y.o. [id number] POD#6 s/p pLTCS and BTL who presented to the OB ED with complaints of acute onset of shortness of breath since earlier this afternoon and subsequently diagnosed with pre-eclampsia with severe features 2/2 sustained severely elevated blood pressures and pulmonary edema. Patient denies headache with vision changes, chest pain, palpitations, RUQ pain, increased lower limb swelling/ pain/ erythema.      Of note, prior to discharge, patient was diagnosed with gHTN; however, did not require magnesium or anti-hypertensive medication. Additionally, patient had postpartum hemorrhage that required 1 unit of pRBCs prior to discharge.      This IUP was complicated by gHTN, maternal obesity, depression, and AMA.      Interval History:   Now POD #11 s/p pLTCS w/ BTL. Patient reports continued shortness of breath with ambulation, however, has improved significantly since admission. Currently on RA. Did desat to 88% overnight, sats usually >90%. Denies HA, vision changes, congestion, abdominal pain, . Denies F/C, headaches, vision changes, N/V, abdominal pain. Denies leg pain or swelling. Reports lochia has been mild. No breast pain/tenderness/warmth, patient is not breast feeding.     Scheduled Meds:   albuterol sulfate  2.5 mg Nebulization Q6H WAKE    amoxicillin  500 mg Oral Q8H    azithromycin  500 mg Oral Daily    docusate sodium  100 mg Oral BID    enoxaparin  40 mg Subcutaneous Q12H    ferrous sulfate  1 tablet Oral Daily    furosemide (LASIX) injection  20 mg Intravenous Daily    ibuprofen  800 mg Oral Q8H    labetaloL  200 mg Oral Q12H     Continuous Infusions:  PRN Meds:benzonatate, calcium gluconate,  diphenhydrAMINE, diphenhydrAMINE, glycerin adult, HYDROcodone-acetaminophen, lactated ringers, ondansetron, prochlorperazine, senna-docusate 8.6-50 mg, simethicone, sodium chloride 0.9%    Review of patient's allergies indicates:  No Known Allergies    Objective:     Vital Signs (Most Recent):  Temp: 98 °F (36.7 °C) (08/29/22 0447)  Pulse: 91 (08/29/22 0813)  Resp: 18 (08/29/22 0813)  BP: 129/69 (08/29/22 0447)  SpO2: 96 % (08/29/22 0813) Vital Signs (24h Range):  Temp:  [98 °F (36.7 °C)-98.8 °F (37.1 °C)] 98 °F (36.7 °C)  Pulse:  [] 91  Resp:  [17-20] 18  SpO2:  [88 %-97 %] 96 %  BP: (123-138)/(65-80) 129/69     Weight: 102.2 kg (225 lb 5 oz)  Body mass index is 44 kg/m².  Patient's last menstrual period was 11/25/2021.    I&O (Last 24H):    Intake/Output Summary (Last 24 hours) at 8/29/2022 0930  Last data filed at 8/29/2022 0510  Gross per 24 hour   Intake 1500 ml   Output 3400 ml   Net -1900 ml         Physical Exam:   Constitutional: She is oriented to person, place, and time. She appears well-developed and well-nourished.    HENT:   Head: Normocephalic and atraumatic.       Pulmonary/Chest: Effort normal. No respiratory distress. Right breast exhibits no mass, no nipple discharge, no skin change, no tenderness and no swelling. Left breast exhibits no mass, no nipple discharge, no skin change, no tenderness and no swelling.        Abdominal: Soft. She exhibits no distension. There is abdominal tenderness (appropriate post-op tenderness). There is no rebound and no guarding.   No fundal tenderness             Musculoskeletal: Normal range of motion.      Right lower leg: She exhibits no tenderness. No edema.      Left lower leg: She exhibits no tenderness. No edema.       Neurological: She is alert and oriented to person, place, and time.    Skin: Skin is warm and dry.    Psychiatric: She has a normal mood and affect.     Laboratory:  CBC:   No results for input(s): WBC, RBC, HGB, HCT, PLT, MCV, MCH, MCHC  in the last 48 hours.    CMP:   No results for input(s): GLU, CALCIUM, ALBUMIN, PROT, NA, K, CO2, CL, BUN, CREATININE, ALKPHOS, ALT, AST, BILITOT in the last 48 hours.        Assessment/Plan:     Active Diagnoses:    Diagnosis Date Noted POA    Pre-eclampsia in postpartum period [O14.95] 08/24/2022 Unknown      Problems Resolved During this Admission:       1. Pre-Eclampsia with Severe Features with Pulmonary Edema   - Severe range blood pressures on admission and pulmonary edema on CXR  - VSS  - Patient currently on RA. Desat to 88% overnight, O2 sats usually >90%.  - Pre-E labs: AST/ALT 26/31, Cr 0.7, Plt 416  - s/p magnesium for seizure prophylaxis  - Continue labetalol 200 mg BID  - Lasix 20 PO daily   - Strict I/O  - Urine output: 1 cc/kg/hr  - Echo 8/25: mild concentric hypertrophy of LV, EF of 55%, grade II LV diastolic function, normal RV size with normal RV systolic function  - CTA: negative for PE   - Plan for walk test today to determine if patient qualifies for home O2. Discharge pending results     2. Pneumonia   - Patient without evidence of postpartum endometritis, wound infection, DVT. Will evaluate for PE vs pneumonia   - Tlast 100.6 8/26  - CXR on admission concerning for right sided pulmonary edema vs pneumonia   - Sputum cultures: many gram pos rods and cocci   - S/p zosyn  - Continue amoxicillin 500 TID and azithromycin 500 QD on discharge        2. Maternal Obesity:   - Continue Lovenox 40 mg BID while inpatient   - SCDs in place  - Encourage ambulation as able      3. Depression:   - No home meds  - Scheduled for postpartum mood check on 09/02/2022      4. POD#8 s/p pLTCS and BTL:   - Continue postpartum management   - Continue glycerin suppository and senna-docusate.   - Patient not breast feeding.   - Pain well controlled        Grsi Gonzales MD  Obstetrics & Gynecology  [facility] ([place])    I have reviewed and agree with progress note, assessment and plan as written by   Janet. Patient is a 37 y.o.  POD 11 s/p pLTCS with BTL. Pt was readmitted with pre-e with severe features and pulmonary edema and subsequent diagnosis of pneumonia. S/p 24 hrs of magnesium.      complicated by post partum hemorrhage resulting in EBL 2285 with large extension of hysterotomy into right broad ligament and uterine artery. She received 1 u PRBC perioperatively. She was discharged home on POD 3 without complications. Pregnancy was c/b A2GDM on metformin.    Pt currently on room air. Brief episode with drop in O2 sat to 88% overnight, currently at 97%. Reports mild dyspnea with walking to the restroom. Otherwise doing well.    Temp:  [98 °F (36.7 °C)-98.8 °F (37.1 °C)] 98.1 °F (36.7 °C)  Pulse:  [] 89  Resp:  [18-20] 20  SpO2:  [88 %-97 %] 97 %  BP: (123-138)/(65-80) 130/75      1.Pneumonia  --CTA and CXR with findings of pneumonia. Flu and COVID neg. CTA neg for PE.  --S/p zosyn  --Currently on Amoxicillin and Azithro PO  --Continue supportive care     2. Pre-e with severe features with pulmonary edema  --Continue blood pressure control  --Continue labetalol 200 mg BID    Possible discharge home today pending walking test. Discussed home oxygen with patient if needed, but she declines stating she will not use it. Pt will need early follow up with primary OB, to be coordinated prior to discharge.      Korina Suarez MD  PGY 5  Maternal Fetal Medicine  [facility]

## 2022-08-29 NOTE — NURSING
Discharge instructions, medications, and follow up appointments reviewed with patient. No questions at this time. PIV removed.

## 2022-08-29 NOTE — DISCHARGE SUMMARY
[facility]  Obstetrics & Gynecology  Discharge Summary    Patient Name: Dipti Villafana  MRN: [id number]  Admission Date: 2022  Hospital Length of Stay: 4 days  Discharge Date and Time:  2022 1:03 PM  Attending Physician: Hodan Stone MD   Discharging Provider: Gris Gonzales MD  Primary Care Provider: Primary Doctor No    HPI:   Dipti Villafana is a 37 y.o. [id number] POD#6 s/p pLTCS and BTL who presented to the OB ED with complaints of acute onset of shortness of breath since earlier this afternoon and subsequently diagnosed with pre-eclampsia with severe features 2/2 sustained severely elevated blood pressures and pulmonary edema. Patient denies headache with vision changes, chest pain, palpitations, RUQ pain, increased lower limb swelling/ pain/ erythema.      Of note, prior to discharge, patient was diagnosed with gHTN; however, did not require magnesium or anti-hypertensive medication. Additionally, patient had postpartum hemorrhage that required 1 unit of pRBCs prior to discharge.      This IUP was complicated by gHTN, maternal obesity, depression, and AMA.      Hospital Course:   Patient admitted for preE w/ SF (BP, pulm edema). She required 10L NC on admission. Received magnesium x24 hours. Started on labetalol 200 BID with good BP control. Echo on HD #1 overall wnl. Patient received lasix daily with excellent diuresis. On HD #2 patient noted to become febrile and diagnosed with pneumonia. She received zosyn x48 hours and was transitioned to PO amoxicillin/azithromycin and remained afebrile. O2 was weaned to RA. Patient passed walk test with O2 sats maintained >95%. She was deemed stable for discharge with PO antibiotics for a total of 7 days of therapy. She was instructed to followup with her primary OBGYN as listed below.     * No surgery found *     Consults:   Consults (From admission, onward)          Status Ordering Provider     Inpatient consult to Maternal Fetal  Medicine  Once        Provider:  (Not yet assigned)    Acknowledged CELIO SMITH            Significant Diagnostic Studies: Labs: All labs within the past 24 hours have been reviewed  Cardiac Graphics: Echocardiogram: Transthoracic echo (TTE) complete (Cupid Only):   Results for orders placed or performed during the hospital encounter of 08/24/22   Echo   Result Value Ref Range    BSA 2.06 m2    TDI SEPTAL 0.07 m/s    LV LATERAL E/E' RATIO 11.00 m/s    LV SEPTAL E/E' RATIO 14.14 m/s    LA WIDTH 4.50 cm    Left Ventricular Outflow Tract Mean Velocity 0.76 cm/s    Left Ventricular Outflow Tract Mean Gradient 2.54 mmHg    Pulmonary Valve Mean Velocity 0.66 m/s    TDI LATERAL 0.09 m/s    PV PEAK VELOCITY 0.98 cm/s    LVIDd 5.03 3.5 - 6.0 cm    IVS 1.17 (A) 0.6 - 1.1 cm    Posterior Wall 1.35 (A) 0.6 - 1.1 cm    LVIDs 3.68 2.1 - 4.0 cm    FS 27 28 - 44 %    LA volume 69.59 cm3    Sinus 3.10 cm    STJ 1.87 cm    Ascending aorta 2.25 cm    LV mass 252.78 g    LA size 3.22 cm    RVDD 3.13 cm    TAPSE 2.30 cm    RV S' 0.01 cm/s    Left Ventricle Relative Wall Thickness 0.54 cm    AV mean gradient 6 mmHg    AV valve area 2.06 cm2    AV Velocity Ratio 0.70     AV index (prosthetic) 0.62     MV valve area p 1/2 method 3.44 cm2    E/A ratio 1.29     Mean e' 0.08 m/s    E wave deceleration time 220.76 msec    IVRT 129.40 msec    Pulm vein S/D ratio 1.70     LVOT diameter 2.05 cm    LVOT area 3.3 cm2    LVOT peak larry 1.07 m/s    LVOT peak VTI 20.80 cm    Ao peak larry 1.53 m/s    Ao VTI 33.3 cm    RVOT peak larry 0.80 m/s    RVOT peak VTI 18.2 cm    Mr max larry 5.15 m/s    LVOT stroke volume 68.62 cm3    AV peak gradient 9 mmHg    PV mean gradient 1.52 mmHg    E/E' ratio 12.38 m/s    MV Peak E Larry 0.99 m/s    TR Max Larry 2.68 m/s    MV stenosis pressure 1/2 time 64.02 ms    MV Peak A Larry 0.77 m/s    PV Peak S Larry 0.63 m/s    PV Peak D Larry 0.37 m/s    LV Systolic Volume 57.56 mL    LV Systolic Volume Index 29.5 mL/m2    LV  Diastolic Volume 119.73 mL    LV Diastolic Volume Index 61.40 mL/m2    LA Volume Index 35.7 mL/m2    LV Mass Index 130 g/m2    RA Major Axis 5.22 cm    Left Atrium Minor Axis 5.49 cm    Left Atrium Major Axis 5.82 cm    Triscuspid Valve Regurgitation Peak Gradient 29 mmHg    LA Volume Index (Mod) 39.5 mL/m2    LA volume (mod) 77.00 cm3    RA Width 3.60 cm    Right Atrial Pressure (from IVC) 3 mmHg    EF 55 %    TV rest pulmonary artery pressure 32 mmHg    Narrative    · The left ventricle is normal in size with mild concentric hypertrophy   and low normal systolic function.  · The estimated ejection fraction is 55%.  · Grade II left ventricular diastolic dysfunction.  · Mild left atrial enlargement.  · Normal right ventricular size with normal right ventricular systolic   function.  · The estimated PA systolic pressure is 32 mmHg.  · Normal central venous pressure (3 mmHg).          Pending Diagnostic Studies:       None          Final Active Diagnoses:    Diagnosis Date Noted POA    PRINCIPAL PROBLEM:  Pre-eclampsia in postpartum period [O14.95] 08/24/2022 Yes    Pneumonia [J18.9] 08/29/2022 Yes      Problems Resolved During this Admission:        Discharged Condition: good    Disposition: Home or Self Care    Follow Up:   Follow-up Information       [facility] up in 1 week(s).    Specialty: Obstetrics and Gynecology  Why: Hospital follow-up, BP check  Contact information:  [address], [address]  [facility] [address] [phone]                         Patient Instructions:      Diet Adult Regular     Pelvic Rest   Order Comments: Pelvic rest until follow up visit. Nothing in vagina -no sex, tampons, douching, etc.     Notify your health care provider if you experience any of the following:   Order Comments: - Heavy vaginal bleeding soaking through more than 2 pads/hour for > 2 hours  - Severe abdominal pain  - Headache not relieved with Tylenol  - Vision changes  - Chest pain or  shortness or breath     Notify your health care provider if you experience any of the following:  increased confusion or weakness     Notify your health care provider if you experience any of the following:  persistent dizziness, light-headedness, or visual disturbances     Notify your health care provider if you experience any of the following:  severe persistent headache     Notify your health care provider if you experience any of the following:  difficulty breathing or increased cough     Notify your health care provider if you experience any of the following:  severe uncontrolled pain     Notify your health care provider if you experience any of the following:  persistent nausea and vomiting or diarrhea     Notify your health care provider if you experience any of the following:  temperature >100.4     Activity as tolerated     Medications:  Reconciled Home Medications:      Medication List        START taking these medications      amoxicillin 500 MG capsule  Commonly known as: AMOXIL  Take 1 capsule (500 mg total) by mouth every 8 (eight) hours. for 4 days     azithromycin 500 MG tablet  Commonly known as: ZITHROMAX  Take 1 tablet (500 mg total) by mouth once daily. for 4 days  Start taking on: August 30, 2022     labetaloL 200 MG tablet  Commonly known as: NORMODYNE  Take 1 tablet (200 mg total) by mouth every 12 (twelve) hours.            CONTINUE taking these medications      citalopram 20 MG tablet  Commonly known as: CeleXA  TK 1 T PO QD     docusate sodium 100 MG capsule  Commonly known as: COLACE  Take 1 capsule (100 mg total) by mouth 2 (two) times daily.     FeroSuL 325 mg (65 mg iron) Tab tablet  Generic drug: ferrous sulfate  Take 1 tablet by mouth 2 (two) times daily.     glycerin adult suppository  Place 1 suppository rectally as needed for Constipation.     HYDROcodone-acetaminophen 5-325 mg per tablet  Commonly known as: NORCO  Take 1 tablet by mouth every 6 (six) hours as needed for Pain.      hydrocortisone 1 % cream  Apply to affected area 2 times daily     ibuprofen 800 MG tablet  Commonly known as: ADVIL,MOTRIN  Take 1 tablet (800 mg total) by mouth every 8 (eight) hours.     PRENATAL VITAMIN Tab  Generic drug: prenat.vits,tyrone,min-iron-folic  Take 1 tablet by mouth once daily.     sertraline 50 MG tablet  Commonly known as: ZOLOFT  Take 50 mg by mouth once daily.            STOP taking these medications      metFORMIN 500 MG ER 24hr tablet  Commonly known as: GLUCOPHAGE-XR     ondansetron 8 MG tablet  Commonly known as: ZOFRAN     ONETOUCH VERIO REFLECT METER Misc  Generic drug: blood-glucose meter              Gris Gonzales MD  Obstetrics & Gynecology  [facility])

## 2022-08-29 NOTE — NURSING
Dr. Urena notified of pt's O2 saturations dropping below 94% intermittently. No new orders at this time, will continue to monitor and intervene as necessary.

## 2022-09-02 ENCOUNTER — POSTPARTUM VISIT (OUTPATIENT)
Dept: OBSTETRICS AND GYNECOLOGY | Facility: CLINIC | Age: 37
End: 2022-09-02
Payer: MEDICAID

## 2022-09-02 VITALS
SYSTOLIC BLOOD PRESSURE: 148 MMHG | DIASTOLIC BLOOD PRESSURE: 84 MMHG | BODY MASS INDEX: 38.19 KG/M2 | WEIGHT: 195.56 LBS

## 2022-09-02 PROCEDURE — 99999 PR PBB SHADOW E&M-EST. PATIENT-LVL III: ICD-10-PCS | Mod: PBBFAC,,, | Performed by: ADVANCED PRACTICE MIDWIFE

## 2022-09-02 PROCEDURE — 99999 PR PBB SHADOW E&M-EST. PATIENT-LVL III: CPT | Mod: PBBFAC,,, | Performed by: ADVANCED PRACTICE MIDWIFE

## 2022-09-02 PROCEDURE — 99213 OFFICE O/P EST LOW 20 MIN: CPT | Mod: PBBFAC,PN | Performed by: ADVANCED PRACTICE MIDWIFE

## 2022-09-02 PROCEDURE — 59430 PR CARE AFTER DELIVERY ONLY: ICD-10-PCS | Mod: ,,, | Performed by: ADVANCED PRACTICE MIDWIFE

## 2022-09-02 NOTE — PROGRESS NOTES
HISTORY OF PRESENT ILLNESS:  Dipti Villafana is a 37 y.o. female   Presents today for a BP check. Pt is 2 weeks s/p LTCS/ BTL. Delivery complicated by gHTN and PP hemorrhage- pt received 1u PRBCs. Pt did not require magnesium sulfate or hypertensive meds during initial admit. Pt was discharged home on 22. Pt was readmitted on 22 sec to acute onset of SOB- subsequent admit for pre-eclampsia and pulmonary edema. Pt received a course of magnesium sulfate, hypertensive meds, lasix. Pt was discharge home on 22- continues on azithromycin 500mg daily and labetalol 200 mg BID. Pt reports she is feeling well, reports infant doing well. BP stable today 148/84      Delivery Date:  22  Gender:  FEmale  Baby Name:  Sotero  Breast Feeding: No  Vaginal Bleeding: Slight  Incontinence: No  Depression: No  [place] yet: No  Contraception - had a BTL  Support system: Good.    PREGNANCY COMPLICATED BY:    Current Outpatient Medications on File Prior to Visit   Medication Sig Dispense Refill    amoxicillin (AMOXIL) 500 MG capsule Take 1 capsule (500 mg total) by mouth every 8 (eight) hours. for 4 days 12 capsule 0    azithromycin (ZITHROMAX) 500 MG tablet Take 1 tablet (500 mg total) by mouth once daily. for 4 days 4 tablet 0    docusate sodium (COLACE) 100 MG capsule Take 1 capsule (100 mg total) by mouth 2 (two) times daily. 60 capsule 0    HYDROcodone-acetaminophen (NORCO) 5-325 mg per tablet Take 1 tablet by mouth every 6 (six) hours as needed for Pain. 20 tablet 0    ibuprofen (ADVIL,MOTRIN) 800 MG tablet Take 1 tablet (800 mg total) by mouth every 8 (eight) hours. 30 tablet 1    labetaloL (NORMODYNE) 200 MG tablet Take 1 tablet (200 mg total) by mouth every 12 (twelve) hours. 60 tablet 11    prenat.vits,tyrone,min-iron-folic (PRENATAL VITAMIN) Tab Take 1 tablet by mouth once daily. 30 each 11    citalopram (CELEXA) 20 MG tablet TK 1 T PO QD  3    FEROSUL 325 mg (65 mg iron) Tab tablet Take 1 tablet  by mouth 2 (two) times daily.      glycerin adult suppository Place 1 suppository rectally as needed for Constipation. (Patient not taking: Reported on 9/2/2022) 1 suppository 1    hydrocortisone 1 % cream Apply to affected area 2 times daily (Patient not taking: Reported on 9/2/2022) 28.35 g 1    sertraline (ZOLOFT) 50 MG tablet Take 50 mg by mouth once daily.      [DISCONTINUED] blood-glucose meter (ONETOUCH VERIO METER) Misc Use as directed to check blood sugar 4 times a day 1 each 0    [DISCONTINUED] metFORMIN (GLUCOPHAGE-XR) 500 MG ER 24hr tablet Take 1 tablet (500 mg total) by mouth daily with dinner or evening meal. for 60 doses 60 tablet 0     No current facility-administered medications on file prior to visit.       BP (!) 148/84   Wt 88.7 kg (195 lb 8.8 oz)   LMP 11/25/2021   BMI 38.19 kg/m²     PE:   APPEARANCE: Well nourished, well developed, in no acute distress.   AFFECT: WNL, alert and oriented x 3.   PELVIC: Deferred   EXTREMITIES: No edema     DIAGNOSIS:   1.Post Partum BP check    LABS AND TESTS ORDERED:     MEDICATIONS PRESCRIBED:       COUNSELING:    Discussed course of treeatment- reviewed s/s pre e and need to report if occur. Has PP visit scheduled in 4 weeks      FOLLOW-UP - PP visit exam

## 2022-10-05 ENCOUNTER — POSTPARTUM VISIT (OUTPATIENT)
Dept: OBSTETRICS AND GYNECOLOGY | Facility: CLINIC | Age: 37
End: 2022-10-05
Payer: MEDICAID

## 2022-10-05 ENCOUNTER — PROCEDURE VISIT (OUTPATIENT)
Dept: OBSTETRICS AND GYNECOLOGY | Facility: CLINIC | Age: 37
End: 2022-10-05
Payer: MEDICAID

## 2022-10-05 VITALS
WEIGHT: 189.63 LBS | SYSTOLIC BLOOD PRESSURE: 130 MMHG | DIASTOLIC BLOOD PRESSURE: 87 MMHG | BODY MASS INDEX: 37.03 KG/M2

## 2022-10-05 DIAGNOSIS — R73.01 IMPAIRED FASTING GLUCOSE: Primary | ICD-10-CM

## 2022-10-05 DIAGNOSIS — R73.01 IMPAIRED FASTING GLUCOSE: ICD-10-CM

## 2022-10-05 LAB
ESTIMATED AVG GLUCOSE: 108 MG/DL (ref 68–131)
HBA1C MFR BLD: 5.4 % (ref 4–5.6)

## 2022-10-05 PROCEDURE — 99999 PR PBB SHADOW E&M-EST. PATIENT-LVL III: ICD-10-PCS | Mod: PBBFAC,,, | Performed by: OBSTETRICS & GYNECOLOGY

## 2022-10-05 PROCEDURE — 99213 OFFICE O/P EST LOW 20 MIN: CPT | Mod: PBBFAC,PN | Performed by: OBSTETRICS & GYNECOLOGY

## 2022-10-05 PROCEDURE — 83036 HEMOGLOBIN GLYCOSYLATED A1C: CPT | Performed by: OBSTETRICS & GYNECOLOGY

## 2022-10-05 PROCEDURE — 0503F PR POSTPARTUM CARE VISIT: ICD-10-PCS | Mod: ,,, | Performed by: OBSTETRICS & GYNECOLOGY

## 2022-10-05 PROCEDURE — 0503F POSTPARTUM CARE VISIT: CPT | Mod: ,,, | Performed by: OBSTETRICS & GYNECOLOGY

## 2022-10-05 PROCEDURE — 99999 PR PBB SHADOW E&M-EST. PATIENT-LVL III: CPT | Mod: PBBFAC,,, | Performed by: OBSTETRICS & GYNECOLOGY

## 2022-10-05 RX ORDER — VENLAFAXINE HYDROCHLORIDE 37.5 MG/1
37.5 CAPSULE, EXTENDED RELEASE ORAL DAILY
Qty: 30 CAPSULE | Refills: 2 | Status: SHIPPED | OUTPATIENT
Start: 2022-10-05 | End: 2023-10-05

## 2022-10-05 NOTE — PROGRESS NOTES
"Past medical, surgical, social, family, and obstetric histories; medications; prior records and results; and available outside records were reviewed and updated in the EMR.  Pertinent findings were noted below.    Reason for Visit   Postpartum Care    HPI   37 y.o. female     Patient's last menstrual period was 2021.    Delivery: CS + BTL on 2022  Hospitalization: readmitted post partum for PP PreE and pneumonia  Ambulating, tolerating Po, moving bowels: Y  Pain controlled: Y  Bleeding: N  Breastfeeding: Y  Breast pain or engorgement: N  Postpartum depression: endorses low mood, stressors with job and infant  Incision: Y  Contraception: s/p BTL    Still taking BP medication - labetalol 200 BID  GDMA2 during pregnancy    Pap: 2019- ASCUS/HPV(-)  Mammogram: N/A  Allergies: Patient has no known allergies.    Exam   /87   Wt 86 kg (189 lb 9.5 oz)   LMP 2021   Breastfeeding No   BMI 37.03 kg/m²     Physical Exam  Constitutional:       General: She is not in acute distress.     Appearance: She is not toxic-appearing.   Pulmonary:      Effort: No respiratory distress.   Abdominal:      General: There is no distension.      Palpations: Abdomen is soft. There is no mass.      Tenderness: There is no abdominal tenderness. There is no guarding or rebound.      Comments: Incision c/d/i     Assessment and Plan   Impaired fasting glucose  -     Hemoglobin A1C; Future; Expected date: 10/05/2022    Post partum depression  -     venlafaxine (EFFEXOR XR) 37.5 MG 24 hr capsule; Take 1 capsule (37.5 mg total) by mouth once daily.  Dispense: 30 capsule; Refill: 2      Postpartum  Doing well  Exam: WNL  Mood / depression screening: desires to start medication, has tried Zoloft in past but felt like "zombie." Okay with trying different medication. Denies SI/HI. Counseled that she needs to use consistently over 4 weeks to notice improvement.  Lactation referral: N/A  Contraception: s/p BTL  Stop " labetalol and follow up with PCP to determine if she still has HTN and requires meds outside of post partum period  HbA1C for DM screen    Follow-up: [facility] for pap w/ cotest

## 2022-10-07 ENCOUNTER — PATIENT MESSAGE (OUTPATIENT)
Dept: OBSTETRICS AND GYNECOLOGY | Facility: CLINIC | Age: 37
End: 2022-10-07
Payer: MEDICAID

## 2022-10-07 NOTE — PROGRESS NOTES
Lab Documentation:    Order Type: Written Order placed in [facility]    Patient in for lab visit only per provider treatment plan.

## 2022-10-10 ENCOUNTER — PATIENT MESSAGE (OUTPATIENT)
Dept: OBSTETRICS AND GYNECOLOGY | Facility: CLINIC | Age: 37
End: 2022-10-10
Payer: MEDICAID

## 2022-11-28 PROBLEM — J18.9 PNEUMONIA: Status: RESOLVED | Noted: 2022-08-29 | Resolved: 2022-11-28

## 2023-06-26 PROBLEM — Z3A.33 33 WEEKS GESTATION OF PREGNANCY: Status: RESOLVED | Noted: 2022-07-19 | Resolved: 2023-06-26

## 2023-09-04 NOTE — PROGRESS NOTES
Administered medroxyprogesterone 150mg/ml on 7/3/2017  Lot# [id number] Exp: 01/2021 Negative preg test by Grace SOSA PharmD  
No

## 2023-12-21 DIAGNOSIS — Z12.31 ENCOUNTER FOR SCREENING MAMMOGRAM FOR MALIGNANT NEOPLASM OF BREAST: Primary | ICD-10-CM

## 2024-01-10 ENCOUNTER — HOSPITAL ENCOUNTER (OUTPATIENT)
Dept: RADIOLOGY | Facility: HOSPITAL | Age: 39
Discharge: HOME | End: 2024-01-10
Attending: NURSE PRACTITIONER
Payer: MEDICAID

## 2024-01-10 DIAGNOSIS — Z12.31 ENCOUNTER FOR SCREENING MAMMOGRAM FOR MALIGNANT NEOPLASM OF BREAST: ICD-10-CM

## 2024-01-10 PROCEDURE — 77067 SCR MAMMO BI INCL CAD: CPT | Mod: 26,,, | Performed by: RADIOLOGY

## 2024-01-10 PROCEDURE — 77067 SCR MAMMO BI INCL CAD: CPT | Mod: TC

## 2024-01-10 PROCEDURE — 77063 BREAST TOMOSYNTHESIS BI: CPT | Mod: 26,,, | Performed by: RADIOLOGY

## 2024-06-11 PROBLEM — Z01.818 PREOPERATIVE EVALUATION TO RULE OUT SURGICAL CONTRAINDICATION: Status: ACTIVE | Noted: 2022-07-10

## 2024-12-11 DIAGNOSIS — M25.572 LEFT ANKLE PAIN, UNSPECIFIED CHRONICITY: Primary | ICD-10-CM

## 2024-12-11 DIAGNOSIS — M25.562 LEFT KNEE PAIN, UNSPECIFIED CHRONICITY: Primary | ICD-10-CM

## 2024-12-12 ENCOUNTER — OFFICE VISIT (OUTPATIENT)
Dept: ORTHOPEDICS | Facility: CLINIC | Age: 39
End: 2024-12-12
Payer: MEDICAID

## 2024-12-12 VITALS
SYSTOLIC BLOOD PRESSURE: 119 MMHG | WEIGHT: 209.88 LBS | BODY MASS INDEX: 40.99 KG/M2 | DIASTOLIC BLOOD PRESSURE: 80 MMHG | HEART RATE: 86 BPM

## 2024-12-12 DIAGNOSIS — G89.29 CHRONIC PAIN OF LEFT ANKLE: Primary | ICD-10-CM

## 2024-12-12 DIAGNOSIS — S93.432A SPRAIN OF TIBIOFIBULAR LIGAMENT OF LEFT ANKLE, INITIAL ENCOUNTER: ICD-10-CM

## 2024-12-12 DIAGNOSIS — M25.572 CHRONIC PAIN OF LEFT ANKLE: Primary | ICD-10-CM

## 2024-12-12 PROCEDURE — 1160F RVW MEDS BY RX/DR IN RCRD: CPT | Mod: CPTII,,,

## 2024-12-12 PROCEDURE — 99204 OFFICE O/P NEW MOD 45 MIN: CPT | Mod: S$PBB,,,

## 2024-12-12 PROCEDURE — 3079F DIAST BP 80-89 MM HG: CPT | Mod: CPTII,,,

## 2024-12-12 PROCEDURE — 99999 PR PBB SHADOW E&M-EST. PATIENT-LVL III: CPT | Mod: PBBFAC,,,

## 2024-12-12 PROCEDURE — 3074F SYST BP LT 130 MM HG: CPT | Mod: CPTII,,,

## 2024-12-12 PROCEDURE — 99213 OFFICE O/P EST LOW 20 MIN: CPT | Mod: PBBFAC,PN

## 2024-12-12 PROCEDURE — 1159F MED LIST DOCD IN RCRD: CPT | Mod: CPTII,,,

## 2024-12-12 PROCEDURE — 3008F BODY MASS INDEX DOCD: CPT | Mod: CPTII,,,

## 2024-12-12 RX ORDER — AMLODIPINE BESYLATE 5 MG/1
5 TABLET ORAL
COMMUNITY

## 2024-12-12 RX ORDER — ERGOCALCIFEROL 1.25 MG/1
50000 CAPSULE ORAL
COMMUNITY

## 2024-12-12 NOTE — PROGRESS NOTES
Patient ID: Dipti Villafana is a 39 y.o. female    Pain of the Left Ankle    History of Present Illness:    Dipti Villafana presents to clinic for left ankle pain. Patient notes chronic ankle sprains throughout her life. The pain started 5 years ago and is becoming progressively worse.  Pain is located over (points to) anterior and lateral ankle. She reports that the pain is a 0 /10 pain today. The pain is affecting ADLs and limiting desired level of activity. She endorses some numbness and tingling with swelling, but this resolves with rest. Pain is 10 /10 at its worst. She did see [facility] ago and tried CSI, which did help for awhile. But pain returned.    Harris of  OTC bracing  with no improvement. Notices instability with swelling.     Occupation:     Ambulating: unassisted   Diabetic: no  Smoking: vapes occasionally   Hx of DVT/PE: no    PAST MEDICAL HISTORY:   Past Medical History:   Diagnosis Date    Anemia     Gestational diabetes mellitus (GDM) affecting pregnancy 2022    Pre-eclampsia in postpartum period 2022     PAST SURGICAL HISTORY:   Past Surgical History:   Procedure Laterality Date     SECTION  2022     SECTION WITH TUBAL LIGATION N/A 2022    Procedure:  SECTION, WITH TUBAL LIGATION;  Surgeon: Jaqui Bedolla MD;  [facility];  Service: OB/GYN;  Laterality: N/A;    ESOPHAGOGASTRODUODENOSCOPY N/A 2024    Procedure: EGD (ESOPHAGOGASTRODUODENOSCOPY);  Surgeon: Martin Herndon MD;  Location: [facility];  Service: Endoscopy;  Laterality: N/A;     FAMILY HISTORY:   Family History   Problem Relation Name Age of Onset    Diabetes Father      Hypertension Mother      Diabetes Mother      Heart failure Sister      Breast cancer Neg Hx      Colon cancer Neg Hx      Ovarian cancer Neg Hx       SOCIAL HISTORY:   Social History     Occupational History    Not on file   Tobacco Use    Smoking status: Never    Smokeless  tobacco: Never   Substance and Sexual Activity    Alcohol use: Not Currently     Comment: occasional    Drug use: No    Sexual activity: Not Currently     Partners: Male     Birth control/protection: None     Comment: Mirena 2012        MEDICATIONS:   Current Outpatient Medications:     amLODIPine (NORVASC) 5 MG tablet, Take 5 mg by mouth., Disp: , Rfl:     ergocalciferol (ERGOCALCIFEROL) 50,000 unit Cap, Take 50,000 Units by mouth every 7 days., Disp: , Rfl:   ALLERGIES: Review of patient's allergies indicates:  No Known Allergies      Physical Exam     Vitals:    12/12/24 0902   BP: 119/80   Pulse: 86     Alert and oriented to person, place and time. No acute distress. Well-groomed, not ill appearing. Pupils round and reactive, normal respiratory effort, no audible wheezing.     left Foot and Ankle Exam    INSPECTION:        Gait:    Normal      Scars:   None     Color:   Normal      Atrophy:  None      Heel / Toe Walking: No difficulty     ALIGNMENT:    Hindfoot  Normal  Forefoot  Normal                        TENDERNESS:       Sinus tarsi:    none    Syndesmosis:    +    ATFL:     +           Fibula:     none  Peroneal tendons:   none        Talonavicular:    none   Anterior tibialis:   none   Anterolateral joint line:  none  Anteromedial joint line:  +          Deltoid:    none    Malleolus:    none    PTT:     none    Navicular:    none       Achilles    none  Calcaneal Insertion   none  Retrocalcaneus   none        RANGE OF MOTION:     Ankle DF/PF:    15/45        Eversion/Inversion:   15/25     Midfoot ABD/ADD:   10/10     First MTP DF/PF:   60/25    STRENGTH    Peroneals:  5/5  Anterior tibialis: 5/5  Posterior tibialis: 5/5  Gastroc-soleus: 5/5  EHL:   5/5  FHL:   5/5             SPECIAL TESTS:    Anterior drawer:   Normal      (C-W contralateral side)          Forced DF/ER: +  Mid-leg squeeze  No pain at syndesmosis  Forced DF:  No pain anterior joint line.    Forced PF:  No pain posterior ankle.   Forced  INV:  No pain lateral  Forced EV:  No pain medial   Virks sign: Normal ankle plantar flexion.   Resisted peroneal No subluxation or pain  1st-2nd MT toggle No pain at Lisfranc      Imaging:     3 view  Left ankle X-rays ordered/reviewed by me showing no evidence of fracture or dislocation. There is no obvious malalignment. No evidence of masses, lesions or foreign bodies. degenerative change at the navicular/cuneiform joint.     Assessment & Plan    Chronic pain of left ankle  -     MRI Ankle Without Contrast Left; Future; Expected date: 12/12/2024    Sprain of tibiofibular ligament of left ankle, initial encounter         I made the decision to obtain old records of the patient including previous notes and imaging. New imaging was ordered today of the extremity or extremities evaluated. I independently reviewed and interpreted the radiographs and/or MRIs/CT scan today as well as prior imaging.    We discussed at length different treatment options including conservative vs surgical management. These include anti-inflammatories, acetaminophen, rest, ice, heat, formal physical therapy including strengthening and stretching exercises, home exercise programs, injections, dry needling, and finally surgical intervention.      Patient here for chronic left ankle pain.  She endorses instability.  Previously had cortisone injection with mild improvement however pain returned.  We discussed likely she has chronic ankle sprain as well as arthritis due to multiple previous ankle sprains.  Due to the chronicity of symptoms and lack of improvement with conservative treatment, we will obtain MRI of the left ankle to evaluate internal structures.  Discussed patient will likely require foot and ankle specialist pending MRI results.    Follow up:  MRI results  X-rays next visit:  None    All questions were answered and patient is agreeable to the above plan.

## (undated) DEVICE — DRESSING AQUACEL AG ADV 3.5X12

## (undated) DEVICE — KIT SURGIFLO HEMOSTATIC MATRIX